# Patient Record
Sex: MALE | Race: WHITE | Employment: UNEMPLOYED | URBAN - METROPOLITAN AREA
[De-identification: names, ages, dates, MRNs, and addresses within clinical notes are randomized per-mention and may not be internally consistent; named-entity substitution may affect disease eponyms.]

---

## 2022-04-30 ENCOUNTER — HOSPITAL ENCOUNTER (INPATIENT)
Age: 87
LOS: 5 days | Discharge: HOME HEALTH | DRG: 291 | End: 2022-05-05
Attending: EMERGENCY MEDICINE | Admitting: INTERNAL MEDICINE
Payer: MEDICARE

## 2022-04-30 ENCOUNTER — APPOINTMENT (OUTPATIENT)
Dept: GENERAL RADIOLOGY | Age: 87
DRG: 291 | End: 2022-04-30
Payer: MEDICARE

## 2022-04-30 DIAGNOSIS — L03.315 CELLULITIS OF PERINEUM: ICD-10-CM

## 2022-04-30 DIAGNOSIS — I50.9 ACUTE CONGESTIVE HEART FAILURE, UNSPECIFIED HEART FAILURE TYPE (HCC): Primary | ICD-10-CM

## 2022-04-30 PROBLEM — I50.43 CHF (CONGESTIVE HEART FAILURE), NYHA CLASS I, ACUTE ON CHRONIC, COMBINED (HCC): Status: ACTIVE | Noted: 2022-04-30

## 2022-04-30 PROBLEM — I50.23 ACUTE ON CHRONIC SYSTOLIC HEART FAILURE (HCC): Status: ACTIVE | Noted: 2022-04-30

## 2022-04-30 LAB
ANION GAP SERPL CALCULATED.3IONS-SCNC: 8 MMOL/L (ref 3–16)
BACTERIA: ABNORMAL /HPF
BASOPHILS ABSOLUTE: 0 K/UL (ref 0–0.2)
BASOPHILS RELATIVE PERCENT: 0.5 %
BILIRUBIN URINE: NEGATIVE
BLOOD, URINE: ABNORMAL
BUN BLDV-MCNC: 14 MG/DL (ref 7–20)
CALCIUM SERPL-MCNC: 8.7 MG/DL (ref 8.3–10.6)
CHLORIDE BLD-SCNC: 103 MMOL/L (ref 99–110)
CLARITY: CLEAR
CO2: 25 MMOL/L (ref 21–32)
COLOR: YELLOW
CREAT SERPL-MCNC: 1 MG/DL (ref 0.8–1.3)
EOSINOPHILS ABSOLUTE: 0.1 K/UL (ref 0–0.6)
EOSINOPHILS RELATIVE PERCENT: 1.5 %
EPITHELIAL CELLS, UA: 2 /HPF (ref 0–5)
GFR AFRICAN AMERICAN: >60
GFR NON-AFRICAN AMERICAN: >60
GLUCOSE BLD-MCNC: 116 MG/DL (ref 70–99)
GLUCOSE BLD-MCNC: 132 MG/DL (ref 70–99)
GLUCOSE BLD-MCNC: 85 MG/DL (ref 70–99)
GLUCOSE BLD-MCNC: 92 MG/DL (ref 70–99)
GLUCOSE URINE: NEGATIVE MG/DL
HCT VFR BLD CALC: 37.6 % (ref 40.5–52.5)
HEMOGLOBIN: 12 G/DL (ref 13.5–17.5)
HYALINE CASTS: 1 /LPF (ref 0–8)
KETONES, URINE: NEGATIVE MG/DL
LACTIC ACID: 1.7 MMOL/L (ref 0.4–2)
LEUKOCYTE ESTERASE, URINE: ABNORMAL
LYMPHOCYTES ABSOLUTE: 1.1 K/UL (ref 1–5.1)
LYMPHOCYTES RELATIVE PERCENT: 14.9 %
MAGNESIUM: 1.9 MG/DL (ref 1.8–2.4)
MCH RBC QN AUTO: 27.9 PG (ref 26–34)
MCHC RBC AUTO-ENTMCNC: 31.9 G/DL (ref 31–36)
MCV RBC AUTO: 87.3 FL (ref 80–100)
MICROSCOPIC EXAMINATION: YES
MONOCYTES ABSOLUTE: 0.8 K/UL (ref 0–1.3)
MONOCYTES RELATIVE PERCENT: 11.3 %
NEUTROPHILS ABSOLUTE: 5.3 K/UL (ref 1.7–7.7)
NEUTROPHILS RELATIVE PERCENT: 71.8 %
NITRITE, URINE: NEGATIVE
PDW BLD-RTO: 17.6 % (ref 12.4–15.4)
PERFORMED ON: ABNORMAL
PERFORMED ON: ABNORMAL
PERFORMED ON: NORMAL
PH UA: 5.5 (ref 5–8)
PLATELET # BLD: 114 K/UL (ref 135–450)
PMV BLD AUTO: 8.9 FL (ref 5–10.5)
POTASSIUM REFLEX MAGNESIUM: 3.6 MMOL/L (ref 3.5–5.1)
PRO-BNP: ABNORMAL PG/ML (ref 0–449)
PROTEIN UA: ABNORMAL MG/DL
RBC # BLD: 4.31 M/UL (ref 4.2–5.9)
RBC UA: 3 /HPF (ref 0–4)
SODIUM BLD-SCNC: 136 MMOL/L (ref 136–145)
SPECIFIC GRAVITY UA: 1.01 (ref 1–1.03)
TROPONIN: <0.01 NG/ML
URINE TYPE: ABNORMAL
UROBILINOGEN, URINE: 1 E.U./DL
WBC # BLD: 7.4 K/UL (ref 4–11)
WBC UA: 12 /HPF (ref 0–5)

## 2022-04-30 PROCEDURE — 83880 ASSAY OF NATRIURETIC PEPTIDE: CPT

## 2022-04-30 PROCEDURE — 36415 COLL VENOUS BLD VENIPUNCTURE: CPT

## 2022-04-30 PROCEDURE — 71046 X-RAY EXAM CHEST 2 VIEWS: CPT

## 2022-04-30 PROCEDURE — 2580000003 HC RX 258: Performed by: INTERNAL MEDICINE

## 2022-04-30 PROCEDURE — 96374 THER/PROPH/DIAG INJ IV PUSH: CPT

## 2022-04-30 PROCEDURE — 51798 US URINE CAPACITY MEASURE: CPT

## 2022-04-30 PROCEDURE — 1200000000 HC SEMI PRIVATE

## 2022-04-30 PROCEDURE — 83605 ASSAY OF LACTIC ACID: CPT

## 2022-04-30 PROCEDURE — 93005 ELECTROCARDIOGRAM TRACING: CPT | Performed by: EMERGENCY MEDICINE

## 2022-04-30 PROCEDURE — 99223 1ST HOSP IP/OBS HIGH 75: CPT | Performed by: INTERNAL MEDICINE

## 2022-04-30 PROCEDURE — 83735 ASSAY OF MAGNESIUM: CPT

## 2022-04-30 PROCEDURE — 6360000002 HC RX W HCPCS: Performed by: EMERGENCY MEDICINE

## 2022-04-30 PROCEDURE — 99285 EMERGENCY DEPT VISIT HI MDM: CPT

## 2022-04-30 PROCEDURE — 85025 COMPLETE CBC W/AUTO DIFF WBC: CPT

## 2022-04-30 PROCEDURE — 2500000003 HC RX 250 WO HCPCS: Performed by: INTERNAL MEDICINE

## 2022-04-30 PROCEDURE — 6360000002 HC RX W HCPCS: Performed by: INTERNAL MEDICINE

## 2022-04-30 PROCEDURE — 81001 URINALYSIS AUTO W/SCOPE: CPT

## 2022-04-30 PROCEDURE — 84484 ASSAY OF TROPONIN QUANT: CPT

## 2022-04-30 PROCEDURE — 80048 BASIC METABOLIC PNL TOTAL CA: CPT

## 2022-04-30 PROCEDURE — 6370000000 HC RX 637 (ALT 250 FOR IP): Performed by: INTERNAL MEDICINE

## 2022-04-30 RX ORDER — ATORVASTATIN CALCIUM 10 MG/1
10 TABLET, FILM COATED ORAL DAILY
Status: DISCONTINUED | OUTPATIENT
Start: 2022-04-30 | End: 2022-05-05 | Stop reason: HOSPADM

## 2022-04-30 RX ORDER — FUROSEMIDE 10 MG/ML
20 INJECTION INTRAMUSCULAR; INTRAVENOUS ONCE
Status: COMPLETED | OUTPATIENT
Start: 2022-04-30 | End: 2022-04-30

## 2022-04-30 RX ORDER — NICOTINE POLACRILEX 4 MG
15 LOZENGE BUCCAL PRN
Status: DISCONTINUED | OUTPATIENT
Start: 2022-04-30 | End: 2022-05-05 | Stop reason: HOSPADM

## 2022-04-30 RX ORDER — SODIUM CHLORIDE 0.9 % (FLUSH) 0.9 %
5-40 SYRINGE (ML) INJECTION PRN
Status: DISCONTINUED | OUTPATIENT
Start: 2022-04-30 | End: 2022-05-05 | Stop reason: HOSPADM

## 2022-04-30 RX ORDER — SODIUM CHLORIDE 9 MG/ML
INJECTION, SOLUTION INTRAVENOUS PRN
Status: DISCONTINUED | OUTPATIENT
Start: 2022-04-30 | End: 2022-05-05 | Stop reason: HOSPADM

## 2022-04-30 RX ORDER — INSULIN LISPRO 100 [IU]/ML
0-6 INJECTION, SOLUTION INTRAVENOUS; SUBCUTANEOUS
Status: DISCONTINUED | OUTPATIENT
Start: 2022-04-30 | End: 2022-05-05 | Stop reason: HOSPADM

## 2022-04-30 RX ORDER — INSULIN LISPRO 100 [IU]/ML
0-3 INJECTION, SOLUTION INTRAVENOUS; SUBCUTANEOUS NIGHTLY
Status: DISCONTINUED | OUTPATIENT
Start: 2022-04-30 | End: 2022-05-05 | Stop reason: HOSPADM

## 2022-04-30 RX ORDER — POLYETHYLENE GLYCOL 3350 17 G/17G
17 POWDER, FOR SOLUTION ORAL DAILY PRN
Status: DISCONTINUED | OUTPATIENT
Start: 2022-04-30 | End: 2022-05-05 | Stop reason: HOSPADM

## 2022-04-30 RX ORDER — ENOXAPARIN SODIUM 100 MG/ML
40 INJECTION SUBCUTANEOUS DAILY
Status: DISCONTINUED | OUTPATIENT
Start: 2022-04-30 | End: 2022-05-05 | Stop reason: HOSPADM

## 2022-04-30 RX ORDER — DEXTROSE MONOHYDRATE 50 MG/ML
100 INJECTION, SOLUTION INTRAVENOUS PRN
Status: DISCONTINUED | OUTPATIENT
Start: 2022-04-30 | End: 2022-05-05 | Stop reason: HOSPADM

## 2022-04-30 RX ORDER — DEXTROSE MONOHYDRATE 25 G/50ML
12.5 INJECTION, SOLUTION INTRAVENOUS PRN
Status: DISCONTINUED | OUTPATIENT
Start: 2022-04-30 | End: 2022-05-05 | Stop reason: HOSPADM

## 2022-04-30 RX ORDER — ASPIRIN 81 MG/1
81 TABLET ORAL DAILY
Status: DISCONTINUED | OUTPATIENT
Start: 2022-04-30 | End: 2022-05-05 | Stop reason: HOSPADM

## 2022-04-30 RX ORDER — LISINOPRIL 10 MG/1
10 TABLET ORAL DAILY
Status: DISCONTINUED | OUTPATIENT
Start: 2022-05-01 | End: 2022-05-05 | Stop reason: HOSPADM

## 2022-04-30 RX ORDER — FUROSEMIDE 10 MG/ML
40 INJECTION INTRAMUSCULAR; INTRAVENOUS 2 TIMES DAILY
Status: DISCONTINUED | OUTPATIENT
Start: 2022-04-30 | End: 2022-05-02

## 2022-04-30 RX ORDER — TAMSULOSIN HYDROCHLORIDE 0.4 MG/1
0.8 CAPSULE ORAL DAILY
Status: DISCONTINUED | OUTPATIENT
Start: 2022-04-30 | End: 2022-05-05 | Stop reason: HOSPADM

## 2022-04-30 RX ORDER — ONDANSETRON 4 MG/1
4 TABLET, ORALLY DISINTEGRATING ORAL EVERY 8 HOURS PRN
Status: DISCONTINUED | OUTPATIENT
Start: 2022-04-30 | End: 2022-05-05 | Stop reason: HOSPADM

## 2022-04-30 RX ORDER — ACETAMINOPHEN 650 MG/1
650 SUPPOSITORY RECTAL EVERY 6 HOURS PRN
Status: DISCONTINUED | OUTPATIENT
Start: 2022-04-30 | End: 2022-05-05 | Stop reason: HOSPADM

## 2022-04-30 RX ORDER — NIFEDIPINE 30 MG/1
30 TABLET, EXTENDED RELEASE ORAL DAILY
Status: DISCONTINUED | OUTPATIENT
Start: 2022-04-30 | End: 2022-04-30

## 2022-04-30 RX ORDER — METOPROLOL SUCCINATE 25 MG/1
25 TABLET, EXTENDED RELEASE ORAL DAILY
Status: DISCONTINUED | OUTPATIENT
Start: 2022-04-30 | End: 2022-05-05 | Stop reason: HOSPADM

## 2022-04-30 RX ORDER — ONDANSETRON 2 MG/ML
4 INJECTION INTRAMUSCULAR; INTRAVENOUS EVERY 6 HOURS PRN
Status: DISCONTINUED | OUTPATIENT
Start: 2022-04-30 | End: 2022-05-05 | Stop reason: HOSPADM

## 2022-04-30 RX ORDER — ACETAMINOPHEN 325 MG/1
650 TABLET ORAL EVERY 6 HOURS PRN
Status: DISCONTINUED | OUTPATIENT
Start: 2022-04-30 | End: 2022-05-05 | Stop reason: HOSPADM

## 2022-04-30 RX ORDER — ASCORBIC ACID 500 MG
500 TABLET ORAL DAILY
Status: DISCONTINUED | OUTPATIENT
Start: 2022-04-30 | End: 2022-05-05 | Stop reason: HOSPADM

## 2022-04-30 RX ORDER — SODIUM CHLORIDE 0.9 % (FLUSH) 0.9 %
5-40 SYRINGE (ML) INJECTION EVERY 12 HOURS SCHEDULED
Status: DISCONTINUED | OUTPATIENT
Start: 2022-04-30 | End: 2022-05-05 | Stop reason: HOSPADM

## 2022-04-30 RX ORDER — VALSARTAN 80 MG/1
160 TABLET ORAL DAILY
Status: DISCONTINUED | OUTPATIENT
Start: 2022-04-30 | End: 2022-04-30

## 2022-04-30 RX ADMIN — ENOXAPARIN SODIUM 40 MG: 100 INJECTION SUBCUTANEOUS at 11:49

## 2022-04-30 RX ADMIN — METOPROLOL SUCCINATE 25 MG: 25 TABLET, EXTENDED RELEASE ORAL at 11:47

## 2022-04-30 RX ADMIN — Medication 10 ML: at 11:57

## 2022-04-30 RX ADMIN — ASPIRIN 81 MG: 81 TABLET, COATED ORAL at 11:47

## 2022-04-30 RX ADMIN — OXYCODONE HYDROCHLORIDE AND ACETAMINOPHEN 500 MG: 500 TABLET ORAL at 11:47

## 2022-04-30 RX ADMIN — Medication 10 ML: at 20:10

## 2022-04-30 RX ADMIN — MICONAZOLE NITRATE: 2 POWDER TOPICAL at 20:10

## 2022-04-30 RX ADMIN — FUROSEMIDE 40 MG: 10 INJECTION, SOLUTION INTRAMUSCULAR; INTRAVENOUS at 17:27

## 2022-04-30 RX ADMIN — FUROSEMIDE 40 MG: 10 INJECTION, SOLUTION INTRAMUSCULAR; INTRAVENOUS at 11:49

## 2022-04-30 RX ADMIN — ATORVASTATIN CALCIUM 10 MG: 10 TABLET, FILM COATED ORAL at 11:47

## 2022-04-30 RX ADMIN — FUROSEMIDE 20 MG: 10 INJECTION, SOLUTION INTRAMUSCULAR; INTRAVENOUS at 08:32

## 2022-04-30 RX ADMIN — MICONAZOLE NITRATE: 2 POWDER TOPICAL at 11:49

## 2022-04-30 RX ADMIN — NIFEDIPINE 30 MG: 30 TABLET, FILM COATED, EXTENDED RELEASE ORAL at 11:47

## 2022-04-30 RX ADMIN — TAMSULOSIN HYDROCHLORIDE 0.8 MG: 0.4 CAPSULE ORAL at 11:47

## 2022-04-30 ASSESSMENT — PAIN - FUNCTIONAL ASSESSMENT: PAIN_FUNCTIONAL_ASSESSMENT: NONE - DENIES PAIN

## 2022-04-30 ASSESSMENT — ENCOUNTER SYMPTOMS
SHORTNESS OF BREATH: 1
COUGH: 0
NAUSEA: 0
VOMITING: 0
ABDOMINAL PAIN: 0

## 2022-04-30 NOTE — PROGRESS NOTES
4 Eyes Skin Assessment     NAME:  Shila Chamberlain YOB: 1933  MEDICAL RECORD NUMBER:  [id number]    The patient is being assess for  Admission    I agree that 2 RN's have performed a thorough Head to Toe Skin Assessment on the patient. ALL assessment sites listed below have been assessed. Areas assessed by both nurses:    Arms, Elbows, Hands and Sacrum. Buttock, Coccyx, Ischium        Does the Patient have a Wound? Yes wound(s) were present on assessment.  LDA wound assessment was Initiated and completed        Mikel Prevention initiated:  Yes   Wound Care Orders initiated:  Yes    Pressure Injury (Stage 3,4, Unstageable, DTI, NWPT, and Complex wounds) if present place consult order under [de-identified] NA    New and Established Ostomies if present place consult order under : NA      Nurse 1 eSignature: Electronically signed by Liz Ramos RN on 4/30/22 at 12:11 PM EDT    **SHARE this note so that the co-signing nurse is able to place an eSignature**    Nurse 2 eSignature: {Esignature:[id number]}

## 2022-04-30 NOTE — CONSULTS
Referring Physician: Dr. Pearl Krishna  Reason for Consultation: CHF exacerbation  Chief Complaint: Short of breath    Subjective:   History of Present Illness:  Shan Deleon is a 80 y.o. patient who presented to the hospital with complaints of progressive shortness of breath, lower extremity edema, and scrotal edema. The patient typically receives his cardiac care through [facility]. His daughter is present bedside. He believes his symptoms only started over the past few days but later acknowledges that he does not particularly pay attention to the swelling in his legs. He began noticing difficulty in urinating secondary to the edema a few days ago. He denies associated chest pains. He is uncertain if he has gained weight. He reports compliance with his medications. He has known to have a large abdominal aortic aneurysm but his daughter says that he refused surgery. He also had coronary bypass >20 years ago. He lives at home alone. He endorses chronic dyspnea on exertion but is not particularly active with his advanced age. He denies PND or orthopnea but is short of breath with minimal activity. Past Medical History:   has a past medical history of Diabetes mellitus ([address] Hyperlipidemia, Hypertension, and MI (myocardial infarction) ([address] Surgical History:   has a past surgical history that includes Cardiac surgery and hernia repair. Social History:   reports that he quit smoking about 23 years ago. His smoking use included cigarettes. He does not have any smokeless tobacco history on file. He reports that he does not drink alcohol and does not use drugs. Family History:  Denies premature coronary atherosclerosis. Home Medications:  Were reviewed and are listed in nursing record and/or below  Prior to Admission medications    Medication Sig Start Date End Date Taking?  Authorizing Provider   linagliptin (TRADJENTA) 5 MG tablet Take 5 mg by mouth daily   Yes Historical Provider, MD NIFEdipine (NIFEDICAL XL) 30 MG CR tablet Take 30 mg by mouth daily. Historical Provider, MD   lisinopril-hydroCHLOROthiazide (PRINZIDE;ZESTORETIC) 20-25 MG per tablet Take 0.5 tablets by mouth daily     Historical Provider, MD   metFORMIN ER (GLUCOPHAGE-XR) 750 MG XR tablet Take 1,500 mg by mouth daily (with breakfast) 2 tabs in morning     Historical Provider, MD   atorvastatin (LIPITOR) 10 MG tablet Take 10 mg by mouth daily. Historical Provider, MD   atenolol (TENORMIN) 25 MG tablet Take 50 mg by mouth daily 2 tabs in am     Historical Provider, MD   tamsulosin (FLOMAX) 0.4 MG capsule Take 0.8 mg by mouth daily     Historical Provider, MD   aspirin 81 MG EC tablet Take 81 mg by mouth daily. Historical Provider, MD   vitamin E 400 UNIT capsule Take 400 Units by mouth daily. Historical Provider, MD   niacin 100 MG tablet Take 100 mg by mouth daily (with breakfast). Historical Provider, MD   Omega-3 Fatty Acids (FISH OIL) 1000 MG CAPS Take 3,000 mg by mouth 3 times daily. Historical Provider, MD   ascorbic acid (VITAMIN C) 500 MG tablet Take 500 mg by mouth daily.     Historical Provider, MD      CURRENT Medications:  ascorbic acid (VITAMIN C) tablet 500 mg, Daily  aspirin EC tablet 81 mg, Daily  atorvastatin (LIPITOR) tablet 10 mg, Daily  tamsulosin (FLOMAX) capsule 0.8 mg, Daily  sodium chloride flush 0.9 % injection 5-40 mL, 2 times per day  sodium chloride flush 0.9 % injection 5-40 mL, PRN  0.9 % sodium chloride infusion, PRN  ondansetron (ZOFRAN-ODT) disintegrating tablet 4 mg, Q8H PRN   Or  ondansetron (ZOFRAN) injection 4 mg, Q6H PRN  polyethylene glycol (GLYCOLAX) packet 17 g, Daily PRN  acetaminophen (TYLENOL) tablet 650 mg, Q6H PRN   Or  acetaminophen (TYLENOL) suppository 650 mg, Q6H PRN  enoxaparin (LOVENOX) injection 40 mg, Daily  metoprolol succinate (TOPROL XL) extended release tablet 25 mg, Daily  furosemide (LASIX) injection 40 mg, BID  miconazole (MICOTIN) 2 % powder, BID  insulin lispro (HUMALOG) injection vial 0-6 Units, TID WC  insulin lispro (HUMALOG) injection vial 0-3 Units, Nightly  glucose (GLUTOSE) 40 % oral gel 15 g, PRN  dextrose 50 % IV solution, PRN  glucagon (rDNA) injection 1 mg, PRN  dextrose 5 % solution, PRN  perflutren lipid microspheres (DEFINITY) injection 1.65 mg, ONCE PRN    Allergies:  Penicillins     Review of Systems:   · Constitutional: no unanticipated weight loss. There's been no change in energy level, sleep pattern, or activity level. No fevers, chills. · Eyes: No visual changes or diplopia. No scleral icterus. · ENT: No Headaches, hearing loss or vertigo. No mouth sores or sore throat. · Cardiovascular: as reviewed in HPI  · Respiratory: No cough or wheezing, no sputum production. No hemoptysis. · Gastrointestinal: No abdominal pain, appetite loss, blood in stools. No change in bowel or bladder habits. · Genitourinary: No dysuria, trouble voiding, or hematuria. · Musculoskeletal:  No gait disturbance, no joint complaints. · Integumentary: No rash or pruritis. · Neurological: No headache, diplopia, change in muscle strength, numbness or tingling. · Psychiatric: No anxiety or depression. · Endocrine: No temperature intolerance. No excessive thirst, fluid intake, or urination. No tremor. · Hematologic/Lymphatic: No abnormal bruising or bleeding, blood clots or swollen lymph nodes. · Allergic/Immunologic: No nasal congestion or hives. Objective:   PHYSICAL EXAM:    Vitals:    04/30/22 1035   BP: 139/67   Pulse: 88   Resp: 20   Temp: 98 °F (36.7 °C)   SpO2: 98%    Weight: 204 lb 4.8 oz (92.7 kg)       General Appearance:  Alert, cooperative, no respiratory distress at rest.  Elderly. Head:  Normocephalic, without obvious abnormality, atraumatic. Eyes:  Pupils equal and round. No scleral icterus. Mouth: Moist mucosa, no pharyngeal erythema. Poor dentition. Nose: Nares normal. No drainage or sinus tenderness.    Neck: Supple, symmetrical, trachea midline. No adenopathy. No tenderness/mass/nodules. No carotid bruit.  +elevated JVD. Lungs:   Clear to auscultation bilaterally, respirations unlabored. No wheeze, rales, or rhonchi. Chest Wall:  No tenderness or deformity. Heart:  Regular rate. S1/S2 normal. No murmur, rub, or gallop. Abdomen:   Soft, non-tender, bowel sounds active. Musculoskeletal: No muscle wasting or digital clubbing. Extremities: Extremities normal, atraumatic. No cyanosis. 2-3+ BLE edema. Pulses: 2+ radial and carotid pulses, symmetric. Skin: No rashes or lesions. Pysch: Normal mood and affect. Alert and oriented x 4. Neurologic: Moves all extremities. Follows commands. Labs     CBC:   Lab Results   Component Value Date    WBC 7.4 04/30/2022    RBC 4.31 04/30/2022    HGB 12.0 04/30/2022    HCT 37.6 04/30/2022    MCV 87.3 04/30/2022    RDW 17.6 04/30/2022     04/30/2022     CMP:  Lab Results   Component Value Date     04/30/2022    K 3.6 04/30/2022     04/30/2022    CO2 25 04/30/2022    BUN 14 04/30/2022    CREATININE 1.0 04/30/2022    GFRAA >60 04/30/2022    LABGLOM >60 04/30/2022    GLUCOSE 92 04/30/2022    CALCIUM 8.7 04/30/2022     PT/INR:  No results found for: PTINR  HgBA1c:No results found for: LABA1C  Lab Results   Component Value Date    TROPONINI <0.01 04/30/2022       Cardiac Data     EKG: Personally interpreted. 4/30/2022. Sinus rhythm with frequent and consecutive PVCs. Left axis deviation. Poor R wave progression. Voltage criteria for LVH. Abdominal CT: 5/2017 Infrarenal abdominal aortic aneurysm measures up to 6.1 cm. Telemetry: Personally interpreted. Sinus with PVCs. Assessment and Plan   1) Acute on chronic systolic heart failure. EF unknown. NYHA III. Appears hypervolemic. Continue IV Lasix. Continue Toprol-XL. Start home medication of lisinopril. Discontinue home medications of hydrochlorothiazide and nifedipine.   Will consider use of Aldactone pending findings on echocardiogram.  Discussion of SGLT2i can be deferred to the outpatient setting. Patient is not an ICD candidate with advanced age. Will obtain echocardiogram on Monday. 2) CAD s/p CABG. continue medical management risk factor modification including use of aspirin, statin, and beta-blocker. 3) AAA without rupture. Measured 6.1 cm five years ago but has refused intervention. Continue statin and attempts at strict blood pressure control. 4) Type 2 diabetes mellitus. Diabetes management per primary team.    5) Essential hypertension. Controlled. Goal BP <130/80. Continue medical therapy. Code status: The patient is alert and answers questions appropriately. He has capacity for medical decision-making. He endorses his wishes for DNR/DNI-CCA. Patient's daughter is present bedside and is in agreement. Will change CODE STATUS within epic. Overall, the problems requiring hospitalization are high in severity. Thank you for allowing us to participate in the care of [address] [place] [address]. Neda Palm [facility], [address] [place] [address] Road  4/30/2022 2:36 PM

## 2022-04-30 NOTE — ED TRIAGE NOTES
Pt brought in per son after falling this morning at 0330, pt lives at home and stumbled and fell. Pt denies LOC as far as he can remember. Pt has multiple sores with fluid draining, increase in leg swelling, increase in SOB, redness and swelling in scrotum, h/o CABG, DM. Pt states abdomen feels swollen and also has difficulty with urination.

## 2022-04-30 NOTE — ED NOTES
Pt states he has to urinate, but is unable to.   RN repositioned pt.       Shayan Gray RN  04/30/22 6618

## 2022-04-30 NOTE — ED NOTES
Bladder scan completed- MD aware that only 8ml shown in bladder scan. Orders for Lasix received. Will await K+ to return from lab before giving lasix.        Lena Singh RN  04/30/22 4900

## 2022-04-30 NOTE — ED NOTES

## 2022-04-30 NOTE — H&P
Hospital Medicine History & Physical      PCP: Binu Marin MD    Date of Admission: 4/30/2022    Date of Service: Pt seen/examined on 4/30/2022   and Admitted to Inpatient with expected LOS greater than two midnights due to medical therapy. Chief Complaint: Difficulty urinating and leg swelling      History Of Present Illness:      80 y.o. male who presented to [facility] \[facility]\"" AT [place] with 2 to 3-day history of increasing lower extremity swelling and difficulty urinating. Patient is also had some more dyspnea. Daughter who accompanies the patient states that he has had a hard time urinating. They report some redness of his lower extremities as well. Patient denies incontinence. Patient denies chest pain. Recently saw their cardiologist with [facility] Dr. Azam Ge and he recommended follow-up in a year as he had minimal symptoms and seem to be doing okay. Per review of care everywhere he had advised seeking some follow-up for an abdominal aortic aneurysm  Patient had a bypass in 2000. Appears family and patient were not interested in follow-up stress test at the time of Dr. Shruthi Jarquin office visit      Past Medical History:          Diagnosis Date    Diabetes mellitus ([facility]     Hyperlipidemia     Hypertension     MI (myocardial infarction) ([facility]        Past Surgical History:          Procedure Laterality Date    CARDIAC SURGERY      CABGx5 vessels    HERNIA REPAIR      x 3       Medications Prior to Admission:      Prior to Admission medications    Medication Sig Start Date End Date Taking? Authorizing Provider   linagliptin (TRADJENTA) 5 MG tablet Take 5 mg by mouth daily   Yes Historical Provider, MD   NIFEdipine (NIFEDICAL XL) 30 MG CR tablet Take 30 mg by mouth daily.     Historical Provider, MD   lisinopril-hydroCHLOROthiazide (PRINZIDE;ZESTORETIC) 20-25 MG per tablet Take 0.5 tablets by mouth daily     Historical Provider, MD   metFORMIN ER (GLUCOPHAGE-XR) 750 MG XR tablet Take 1,500 mg by mouth daily (with breakfast) 2 tabs in morning     Historical Provider, MD   atorvastatin (LIPITOR) 10 MG tablet Take 10 mg by mouth daily. Historical Provider, MD   atenolol (TENORMIN) 25 MG tablet Take 50 mg by mouth daily 2 tabs in am     Historical Provider, MD   tamsulosin (FLOMAX) 0.4 MG capsule Take 0.8 mg by mouth daily     Historical Provider, MD   aspirin 81 MG EC tablet Take 81 mg by mouth daily. Historical Provider, MD   vitamin E 400 UNIT capsule Take 400 Units by mouth daily. Historical Provider, MD   niacin 100 MG tablet Take 100 mg by mouth daily (with breakfast). Historical Provider, MD   Omega-3 Fatty Acids (FISH OIL) 1000 MG CAPS Take 3,000 mg by mouth 3 times daily. Historical Provider, MD   ascorbic acid (VITAMIN C) 500 MG tablet Take 500 mg by mouth daily. Historical Provider, MD       Allergies:  Penicillins    Social History:      The patient currently lives alone  He has 5 children 1 of whom is   TOBACCO:   reports that he quit smoking about 23 years ago. His smoking use included cigarettes. He does not have any smokeless tobacco history on file. ETOH:   reports no history of alcohol use. E-Cigarettes/Vaping Use     Questions Responses    E-Cigarette/Vaping Use     Start Date     Passive Exposure     Quit Date     Counseling Given     Comments             Family History:       Reviewed in detail and negative for DM, CAD, Cancer, CVA. Positive as follows:    History reviewed. No pertinent family history. REVIEW OF SYSTEMS COMPLETED:   Pertinent positives as noted in the HPI. All other systems reviewed and negative. PHYSICAL EXAM PERFORMED:    /67   Pulse 88   Temp 98 °F (36.7 °C) (Axillary)   Resp 20   Ht 6' 2\" (1.88 m)   Wt 204 lb 4.8 oz (92.7 kg)   SpO2 98%   BMI 26.23 kg/m²     General appearance:  No apparent distress, appears stated age and cooperative. HEENT:  Normal cephalic, atraumatic without obvious deformity.  Pupils equal, round, and reactive to light. Extra ocular muscles intact. Conjunctivae/corneas clear. Neck: Supple, with full range of motion. No jugular venous distention. Trachea midline. Respiratory: Bibasilar Rales  Cardiovascular:  Regular rate and rhythm with normal S1/S2 without murmurs, rubs or gallops. Abdomen: Soft, non-tender, non-distended with normal bowel sounds. Musculoskeletal:  No clubbing, cyanosis or edema bilaterally. Full range of motion without deformity. Skin: Skin color, texture, turgor normal.  No rashes or lesions. Neurologic:  Neurovascularly intact without any focal sensory/motor deficits. Cranial nerves: II-XII intact, grossly non-focal.  Psychiatric:  Alert and oriented, thought content appropriate, normal insight  Capillary Refill: Brisk,3 seconds, normal  Peripheral Pulses: +2 palpable, equal bilaterally   Extremities 2+ pitting edema to the mid calf mild redness of lower extremities. Extremities are not warm to touch    Labs:     Recent Labs     04/30/22  0755   WBC 7.4   HGB 12.0*   HCT 37.6*   *     Recent Labs     04/30/22  0755      K 3.6      CO2 25   BUN 14   CREATININE 1.0   CALCIUM 8.7     No results for input(s): AST, ALT, BILIDIR, BILITOT, ALKPHOS in the last 72 hours. No results for input(s): INR in the last 72 hours. Recent Labs     04/30/22  0755 04/30/22  1049   TROPONINI <0.01 <0.01     BNP >73896    Urinalysis:      Lab Results   Component Value Date    NITRU Negative 04/30/2022    WBCUA 12 04/30/2022    BACTERIA None Seen 04/30/2022    RBCUA 3 04/30/2022    BLOODU Trace 04/30/2022    SPECGRAV 1.013 04/30/2022    GLUCOSEU Negative 04/30/2022       Radiology:     Independently reviewed by me    XR CHEST (2 VW)   Final Result   Pulmonary edema and small bilateral pleural effusions.            EKG- NSR with occ PVCs    ASSESSMENT:    Congestive heart failure new onset-I do not see recent echo on care everywhere  Pt has demonstrable fluid on exam and CXR  Check serial cardiac enzymes to rule out ischemia first set negative  Lasix 40 mg IV every 12 hours to start  Strict I's and O's  Continue angiotensin receptor blocker  Changed to atenolol to Toprol-XL  Echocardiogram ordered  Consult cardiology  I have stopped Procardia for now  Avoiding Curry for now. Advised patient to use urinal. He seems weak I am reluctant to place Curry as a suspect he likely has some BPH and could end up with complications    Abdominal aortic aneurysm-apparently was 6 cm several years ago  We will arrange for abdominal ultrasound Monday for follow-up  Continue aggressive blood pressure control  Given patient's age would be very reasonable to just focus on medical management    Diabetes mellitus type 2-hold metformin for now  Continue correction insulin per sliding scale    Hyperlipidemia-continue Lipitor              DVT Prophylaxis:lovenox  Diet: ADULT DIET; Regular; 4 carb choices (60 gm/meal); Low Sodium (2 gm)  Code Status: Full Code      Dispo - 2-4 d       Steffani Peabody, MD    Thank you Juliette Freeman MD for the opportunity to be involved in this patient's care. If you have any questions or concerns please feel free to contact me at [phone].

## 2022-04-30 NOTE — ED PROVIDER NOTES
[address] [facility] ENCOUNTER      Pt Name: Patricia Escobar. MRN: [id number]  Birthdate 1/26/1933  Date of evaluation: 4/30/2022  Provider: Armani Peter MD    [address] [place] [address]       Chief Complaint   Patient presents with    Leg Swelling     increased leg swelling for the past week, h/o heart failure and CABGX5 20 years ago.  Swelling     abdominal     Abrasion     right arm, right leg skin sores/ abraisions    Fall     pt fell at home at 56, son had to help him up. HISTORY OF PRESENT ILLNESS   (Location/Symptom, Timing/Onset, Context/Setting, Quality, Duration, Modifying Factors, Severity)  Note limiting factors. Camille Feliciano Sr. is a 80 y.o. male who presents to the emergency department with lower extremity edema and a fall this morning. HPI     This is a pleasant 80-year-old  gentleman with history of CHF hypertension type 2 diabetes and is status post CABG about 20 years ago. He has had increased lower extremity edema extending to the scrotum and abdomen over the past few days. No chest pain but is short of breath although the latter seems to be somewhat chronic in nature. He had a mechanical fall this morning but denies injury however his son had to help him up. He typically gets around with a cane at home. Denies any actual pain but has some abrasions on the right arm and bilateral lower extremities. Denies cough chest pain or abdominal pain. Nursing Notes were reviewed. REVIEW OF SYSTEMS    (2-9 systems for level 4, 10 or more for level 5)     Review of Systems   Constitutional: Negative for chills and fever. Respiratory: Positive for shortness of breath. Negative for cough. Cardiovascular: Positive for leg swelling. Negative for chest pain and palpitations. Gastrointestinal: Negative for abdominal pain, nausea and vomiting. Genitourinary: Positive for difficulty urinating. Negative for dysuria. Neurological: Negative for dizziness. All other systems reviewed and are negative. Except as noted above the remainder of the review of systems was reviewed and negative. PAST MEDICAL HISTORY     Past Medical History:   Diagnosis Date    Diabetes mellitus ([facility]     Hyperlipidemia     Hypertension     MI (myocardial infarction) ([facility] [facility]          SURGICAL HISTORY       Past Surgical History:   Procedure Laterality Date    CARDIAC SURGERY      CABGx5 vessels    HERNIA REPAIR      x 3         CURRENT MEDICATIONS       Previous Medications    ASCORBIC ACID (VITAMIN C) 500 MG TABLET    Take 500 mg by mouth daily. ASPIRIN 81 MG EC TABLET    Take 81 mg by mouth daily. ATENOLOL (TENORMIN) 25 MG TABLET    Take 25 mg by mouth daily. 2 tabs in am    ATORVASTATIN (LIPITOR) 10 MG TABLET    Take 10 mg by mouth daily. GLIPIZIDE (GLUCOTROL) 5 MG TABLET    Take 5 mg by mouth daily. LISINOPRIL-HYDROCHLOROTHIAZIDE (PRINZIDE;ZESTORETIC) 10-12.5 MG PER TABLET    Take 1 tablet by mouth daily. METFORMIN ER (GLUCOPHAGE-XR) 750 MG XR TABLET    Take 750 mg by mouth daily (with breakfast). 2 tabs in morning    MUPIROCIN (BACTROBAN) 2 % OINTMENT    APPLY TOPICALLY THREE TIMES DAILY    NIACIN 100 MG TABLET    Take 100 mg by mouth daily (with breakfast). NIFEDIPINE (NIFEDICAL XL) 30 MG CR TABLET    Take 30 mg by mouth daily. OMEGA-3 FATTY ACIDS (FISH OIL) 1000 MG CAPS    Take 3,000 mg by mouth 3 times daily. TAMSULOSIN (FLOMAX) 0.4 MG CAPSULE    Take 0.4 mg by mouth daily. VALSARTAN (DIOVAN) 160 MG TABLET    Take 160 mg by mouth daily. VITAMIN E 400 UNIT CAPSULE    Take 400 Units by mouth daily. ALLERGIES     Penicillins    FAMILY HISTORY     History reviewed. No pertinent family history. SOCIAL HISTORY       Social History     Socioeconomic History    Marital status:       Spouse name: None    Number of children: None    Years of education: None    Highest education level: None   Occupational History    None   Tobacco Use    Smoking status: Former Smoker     Types: Cigarettes     Quit date: 1999     Years since quittin.0    Smokeless tobacco: None   Substance and Sexual Activity    Alcohol use: No    Drug use: No    Sexual activity: None   Other Topics Concern    None   Social History Narrative    None     Social Determinants of Health     Financial Resource Strain:     Difficulty of Paying Living Expenses: Not on file   Food Insecurity:     Worried About Running Out of Food in the Last Year: Not on file    Munira of Food in the Last Year: Not on file   Transportation Needs:     Lack of Transportation (Medical): Not on file    Lack of Transportation (Non-Medical):  Not on file   Physical Activity:     Days of Exercise per Week: Not on file    Minutes of Exercise per Session: Not on file   Stress:     Feeling of Stress : Not on file   Social Connections:     Frequency of Communication with Friends and Family: Not on file    Frequency of Social Gatherings with Friends and Family: Not on file    Attends Ten[employer] Services: Not on file    Active Member of [address] [place] [address] or Organizations: Not on file    Attends Club or Organization Meetings: Not on file    Marital Status: Not on file   Intimate Partner Violence:     Fear of Current or Ex-Partner: Not on file    Emotionally Abused: Not on file    Physically Abused: Not on file    Sexually Abused: Not on file   Housing Stability:     Unable to Pay for Housing in the Last Year: Not on file    Number of [place] in the Last Year: Not on file    Unstable Housing in the Last Year: Not on file       SCREENINGS         [place] Coma Scale  Eye Opening: Spontaneous  Best Verbal Response: Oriented  Best Motor Response: Obeys commands  [place] Coma Scale Score: 15                     CIWA Assessment  BP: 118/66  Pulse: 75                 PHYSICAL EXAM    (up to 7 for level 4, 8 or more for level 5)     ED Triage Vitals BP Temp Temp Source Pulse Resp SpO2 Height Weight   04/30/22 0730 04/30/22 0727 04/30/22 0727 -- 04/30/22 0727 -- 04/30/22 0727 04/30/22 0732   113/65 97.6 °F (36.4 °C) Oral  20  6' 2\" (1.88 m) (!) 447 lb 5 oz (202.9 kg)       Physical Exam  Constitutional:       General: He is not in acute distress. Appearance: Normal appearance. He is not ill-appearing. HENT:      Head: Normocephalic and atraumatic. Right Ear: Tympanic membrane and ear canal normal.      Left Ear: Tympanic membrane and ear canal normal.      Nose: Nose normal. No congestion. Mouth/Throat:      Mouth: Mucous membranes are moist.      Pharynx: No oropharyngeal exudate. Eyes:      Extraocular Movements: Extraocular movements intact. Pupils: Pupils are equal, round, and reactive to light. Cardiovascular:      Rate and Rhythm: Normal rate. Pulses: Normal pulses. Heart sounds: Murmur heard. No friction rub. No gallop. Pulmonary:      Effort: Pulmonary effort is normal.      Breath sounds: Rales present. No wheezing or rhonchi. Abdominal:      General: Bowel sounds are normal.      Palpations: Abdomen is soft. Tenderness: There is no abdominal tenderness. There is no guarding or rebound. Genitourinary:     Penis: Uncircumcised. Testes: Normal.      Comments: Scrotal Edema  Musculoskeletal:         General: Normal range of motion. Cervical back: Normal range of motion and neck supple. Right lower leg: Edema present. Left lower leg: Edema present. Skin:     General: Skin is warm. Capillary Refill: Capillary refill takes less than 2 seconds. Findings: Rash present. Neurological:      Mental Status: He is alert and oriented to person, place, and time.    Psychiatric:         Mood and Affect: Mood normal.         Behavior: Behavior normal.         DIAGNOSTIC RESULTS     EKG: All EKG's are interpreted by the Emergency Department Physician who either signs or Co-signs this chart in the absence of a cardiologist.    EKG Interpretation    Interpreted by emergency department physician    Rhythm: normal sinus   Rate: normal  Axis: left  Ectopy: premature ventricular contractions (unifocal)  Conduction: Prolonged QT  ST Segments: no acute change  T Waves: no acute change  Q Waves: nonspecific    Clinical Impression: non-specific EKG, no acute changes from 5/5/2015 except PVCs    Kevin Jara MD    RADIOLOGY:   Non-plain film images such as CT, Ultrasound and MRI are read by the radiologist. Plain radiographic images are visualized and preliminarily interpreted by the emergency physician with the below findings:        Interpretation per the Radiologist below, if available at the time of this note:    XR CHEST (2 VW)   Final Result   Pulmonary edema and small bilateral pleural effusions. ED BEDSIDE ULTRASOUND:   Performed by ED Physician - none    LABS:  Labs Reviewed   CBC WITH AUTO DIFFERENTIAL - Abnormal; Notable for the following components:       Result Value    Hemoglobin 12.0 (*)     Hematocrit 37.6 (*)     RDW 17.6 (*)     Platelets 007 (*)     All other components within normal limits   BRAIN NATRIURETIC PEPTIDE - Abnormal; Notable for the following components:    Pro-BNP 12,727 (*)     All other components within normal limits   BASIC METABOLIC PANEL W/ REFLEX TO MG FOR LOW K   TROPONIN   LACTIC ACID   URINALYSIS WITH MICROSCOPIC   POCT GLUCOSE       All other labs were within normal range or not returned as of this dictation. EMERGENCY DEPARTMENT COURSE and DIFFERENTIAL DIAGNOSIS/MDM:   Vitals:    Vitals:    04/30/22 0732 04/30/22 0815 04/30/22 0820 04/30/22 0830   BP:  124/65  118/66   Pulse:  83  75   Resp:  22  18   Temp:       TempSrc:       SpO2:  97%  (!) 89%   Weight: 202 lb 14.4 oz (92 kg)  204 lb 4.8 oz (92.7 kg)    Height:           The above history and physical exam were performed. I reviewed his past medical past surgical social family history. 12 point review of system was performed and is negative unless otherwise noted. After the chest x-ray returned he was given Lasix. He has no history of heart failure that I can find in the chart. MDM  Consider the diagnosis of ACS versus CHF versus pneumonia. At this point in time he has CHF given that he has some pulmonary edema and is now requiring about 2 L of oxygen by nasal cannula. He has significant anasarca associated with this so this is consistent as this is markedly elevated BNP. This point time he certainly merits admission to the hospital for further work-up and evaluation. I considered the diagnosis of cellulitis for his lower extremities and groin region however think it cellulitis secondary to Candida given that he has satellite lesions. REASSESSMENT          CRITICAL CARE TIME   Total Critical Care time was 23 minutes, excluding separately reportable procedures. There was a high probability of clinically significant/life threatening deterioration in the patient's condition which required my urgent intervention. For new onset CHF    CONSULTS:  None    PROCEDURES:  Unless otherwise noted below, none     Procedures        FINAL IMPRESSION      1. Acute congestive heart failure, unspecified heart failure type ([address]    2. Cellulitis of perineum          DISPOSITION/PLAN   DISPOSITION    Decision to Admit    PATIENT REFERRED TO:  No follow-up provider specified. DISCHARGE MEDICATIONS:  New Prescriptions    No medications on file     Controlled Substances Monitoring:     No flowsheet data found.     (Please note that portions of this note were completed with a voice recognition program.  Efforts were made to edit the dictations but occasionally words are mis-transcribed.)    Erika Wagner MD (electronically signed)  Attending Emergency Physician         Erika Wagner MD  04/30/22 [id number]

## 2022-04-30 NOTE — ED NOTES
Pt under wear removed, large areas of red, excoriated skin noted in folds. Under wear had some drainage from excoriated areas.      Valiant Smoker, RN  04/30/22 [id number]

## 2022-05-01 LAB
ANION GAP SERPL CALCULATED.3IONS-SCNC: 11 MMOL/L (ref 3–16)
BUN BLDV-MCNC: 14 MG/DL (ref 7–20)
CALCIUM SERPL-MCNC: 8.6 MG/DL (ref 8.3–10.6)
CHLORIDE BLD-SCNC: 104 MMOL/L (ref 99–110)
CHOLESTEROL, TOTAL: 94 MG/DL (ref 0–199)
CO2: 26 MMOL/L (ref 21–32)
CREAT SERPL-MCNC: 1.1 MG/DL (ref 0.8–1.3)
EKG ATRIAL RATE: 86 BPM
EKG DIAGNOSIS: NORMAL
EKG P AXIS: 103 DEGREES
EKG P-R INTERVAL: 170 MS
EKG Q-T INTERVAL: 430 MS
EKG QRS DURATION: 104 MS
EKG QTC CALCULATION (BAZETT): 514 MS
EKG R AXIS: -37 DEGREES
EKG T AXIS: 95 DEGREES
EKG VENTRICULAR RATE: 86 BPM
FERRITIN: 171.3 NG/ML (ref 30–400)
FOLATE: 6 NG/ML (ref 4.78–24.2)
GFR AFRICAN AMERICAN: >60
GFR NON-AFRICAN AMERICAN: >60
GLUCOSE BLD-MCNC: 118 MG/DL (ref 70–99)
GLUCOSE BLD-MCNC: 127 MG/DL (ref 70–99)
GLUCOSE BLD-MCNC: 192 MG/DL (ref 70–99)
GLUCOSE BLD-MCNC: 71 MG/DL (ref 70–99)
GLUCOSE BLD-MCNC: 83 MG/DL (ref 70–99)
HDLC SERPL-MCNC: 40 MG/DL (ref 40–60)
IRON SATURATION: 24 % (ref 20–50)
IRON: 47 UG/DL (ref 59–158)
LDL CHOLESTEROL CALCULATED: 43 MG/DL
MAGNESIUM: 1.7 MG/DL (ref 1.8–2.4)
PERFORMED ON: ABNORMAL
PERFORMED ON: NORMAL
POTASSIUM SERPL-SCNC: 3.7 MMOL/L (ref 3.5–5.1)
SODIUM BLD-SCNC: 141 MMOL/L (ref 136–145)
TOTAL IRON BINDING CAPACITY: 195 UG/DL (ref 260–445)
TRIGL SERPL-MCNC: 57 MG/DL (ref 0–150)
VITAMIN B-12: 293 PG/ML (ref 211–911)
VLDLC SERPL CALC-MCNC: 11 MG/DL

## 2022-05-01 PROCEDURE — 2580000003 HC RX 258: Performed by: INTERNAL MEDICINE

## 2022-05-01 PROCEDURE — 6370000000 HC RX 637 (ALT 250 FOR IP): Performed by: INTERNAL MEDICINE

## 2022-05-01 PROCEDURE — 36415 COLL VENOUS BLD VENIPUNCTURE: CPT

## 2022-05-01 PROCEDURE — 93010 ELECTROCARDIOGRAM REPORT: CPT | Performed by: INTERNAL MEDICINE

## 2022-05-01 PROCEDURE — 80048 BASIC METABOLIC PNL TOTAL CA: CPT

## 2022-05-01 PROCEDURE — 6360000002 HC RX W HCPCS: Performed by: INTERNAL MEDICINE

## 2022-05-01 PROCEDURE — 80061 LIPID PANEL: CPT

## 2022-05-01 PROCEDURE — 82746 ASSAY OF FOLIC ACID SERUM: CPT

## 2022-05-01 PROCEDURE — 82728 ASSAY OF FERRITIN: CPT

## 2022-05-01 PROCEDURE — 83540 ASSAY OF IRON: CPT

## 2022-05-01 PROCEDURE — 82607 VITAMIN B-12: CPT

## 2022-05-01 PROCEDURE — 83735 ASSAY OF MAGNESIUM: CPT

## 2022-05-01 PROCEDURE — 94760 N-INVAS EAR/PLS OXIMETRY 1: CPT

## 2022-05-01 PROCEDURE — 83550 IRON BINDING TEST: CPT

## 2022-05-01 PROCEDURE — 1200000000 HC SEMI PRIVATE

## 2022-05-01 PROCEDURE — 99233 SBSQ HOSP IP/OBS HIGH 50: CPT | Performed by: INTERNAL MEDICINE

## 2022-05-01 RX ORDER — MAGNESIUM SULFATE IN WATER 40 MG/ML
2000 INJECTION, SOLUTION INTRAVENOUS ONCE
Status: COMPLETED | OUTPATIENT
Start: 2022-05-01 | End: 2022-05-01

## 2022-05-01 RX ORDER — POTASSIUM CHLORIDE 20 MEQ/1
40 TABLET, EXTENDED RELEASE ORAL ONCE
Status: COMPLETED | OUTPATIENT
Start: 2022-05-01 | End: 2022-05-01

## 2022-05-01 RX ADMIN — LISINOPRIL 10 MG: 10 TABLET ORAL at 09:14

## 2022-05-01 RX ADMIN — MAGNESIUM SULFATE HEPTAHYDRATE 2000 MG: 40 INJECTION, SOLUTION INTRAVENOUS at 09:04

## 2022-05-01 RX ADMIN — POTASSIUM CHLORIDE 40 MEQ: 20 TABLET, EXTENDED RELEASE ORAL at 09:14

## 2022-05-01 RX ADMIN — ENOXAPARIN SODIUM 40 MG: 100 INJECTION SUBCUTANEOUS at 09:13

## 2022-05-01 RX ADMIN — FUROSEMIDE 40 MG: 10 INJECTION, SOLUTION INTRAMUSCULAR; INTRAVENOUS at 17:13

## 2022-05-01 RX ADMIN — MICONAZOLE NITRATE: 2 POWDER TOPICAL at 20:34

## 2022-05-01 RX ADMIN — FUROSEMIDE 40 MG: 10 INJECTION, SOLUTION INTRAMUSCULAR; INTRAVENOUS at 09:13

## 2022-05-01 RX ADMIN — OXYCODONE HYDROCHLORIDE AND ACETAMINOPHEN 500 MG: 500 TABLET ORAL at 09:14

## 2022-05-01 RX ADMIN — Medication 10 ML: at 20:34

## 2022-05-01 RX ADMIN — METOPROLOL SUCCINATE 25 MG: 25 TABLET, EXTENDED RELEASE ORAL at 09:14

## 2022-05-01 RX ADMIN — ASPIRIN 81 MG: 81 TABLET, COATED ORAL at 09:14

## 2022-05-01 RX ADMIN — ATORVASTATIN CALCIUM 10 MG: 10 TABLET, FILM COATED ORAL at 09:14

## 2022-05-01 RX ADMIN — TAMSULOSIN HYDROCHLORIDE 0.8 MG: 0.4 CAPSULE ORAL at 09:14

## 2022-05-01 RX ADMIN — MICONAZOLE NITRATE: 2 POWDER TOPICAL at 09:14

## 2022-05-01 RX ADMIN — INSULIN LISPRO 1 UNITS: 100 INJECTION, SOLUTION INTRAVENOUS; SUBCUTANEOUS at 20:34

## 2022-05-01 ASSESSMENT — PAIN SCALES - GENERAL
PAINLEVEL_OUTOF10: 0
PAINLEVEL_OUTOF10: 0

## 2022-05-01 NOTE — PROGRESS NOTES
Patient standing up on bedside to use urinal many times this shift - patient has had very good output. Patient states that he believes that his scrotal swelling is starting to decrease and legs are weeping less and appear more dry. Patient vitals remain stable and patient on 3 L nasal cannula.

## 2022-05-01 NOTE — PROGRESS NOTES
Referring Physician: Dr. Maria Isabel Multani  Reason for Consultation: CHF exacerbation  Chief Complaint: Short of breath    Subjective:   History of Present Illness:  Linda Morgan is a 80 y.o. patient who presented to the hospital with complaints of progressive shortness of breath, lower extremity edema, and scrotal edema. The patient typically receives his cardiac care through [facility]. His daughter is present bedside. He believes his symptoms only started over the past few days but later acknowledges that he does not particularly pay attention to the swelling in his legs. He began noticing difficulty in urinating secondary to the edema a few days ago. He denies associated chest pains. He is uncertain if he has gained weight. He reports compliance with his medications. He has known to have a large abdominal aortic aneurysm but his daughter says that he refused surgery. He also had coronary bypass >20 years ago. He lives at home alone. He endorses chronic dyspnea on exertion but is not particularly active with his advanced age. He denies PND or orthopnea but is short of breath with minimal activity. Interval history:  No acute overnight cardiac events. Patient feels shortness of breath is improving with IV diuretics. He is uncertain if the lower extremity edema has changed. He denies associated chest pains, lightheadedness, palpitations, PND, or orthopnea. Past Medical History:   has a past medical history of Diabetes mellitus ([address] Hyperlipidemia, Hypertension, and MI (myocardial infarction) ([address] Surgical History:   has a past surgical history that includes Cardiac surgery and hernia repair. Social History:   reports that he quit smoking about 23 years ago. His smoking use included cigarettes. He does not have any smokeless tobacco history on file. He reports that he does not drink alcohol and does not use drugs. Family History:  Denies premature coronary atherosclerosis.     Home Medications:  Were reviewed and are listed in nursing record and/or below  Prior to Admission medications    Medication Sig Start Date End Date Taking? Authorizing Provider   linagliptin (TRADJENTA) 5 MG tablet Take 5 mg by mouth daily   Yes Historical Provider, MD   NIFEdipine (NIFEDICAL XL) 30 MG CR tablet Take 30 mg by mouth daily. Historical Provider, MD   lisinopril-hydroCHLOROthiazide (PRINZIDE;ZESTORETIC) 20-25 MG per tablet Take 0.5 tablets by mouth daily     Historical Provider, MD   metFORMIN ER (GLUCOPHAGE-XR) 750 MG XR tablet Take 1,500 mg by mouth daily (with breakfast) 2 tabs in morning     Historical Provider, MD   atorvastatin (LIPITOR) 10 MG tablet Take 10 mg by mouth daily. Historical Provider, MD   atenolol (TENORMIN) 25 MG tablet Take 50 mg by mouth daily 2 tabs in am     Historical Provider, MD   tamsulosin (FLOMAX) 0.4 MG capsule Take 0.8 mg by mouth daily     Historical Provider, MD   aspirin 81 MG EC tablet Take 81 mg by mouth daily. Historical Provider, MD   vitamin E 400 UNIT capsule Take 400 Units by mouth daily. Historical Provider, MD   niacin 100 MG tablet Take 100 mg by mouth daily (with breakfast). Historical Provider, MD   Omega-3 Fatty Acids (FISH OIL) 1000 MG CAPS Take 3,000 mg by mouth 3 times daily. Historical Provider, MD   ascorbic acid (VITAMIN C) 500 MG tablet Take 500 mg by mouth daily.     Historical Provider, MD      CURRENT Medications:  magnesium sulfate 2000 mg in 50 mL IVPB premix, Once  potassium chloride (KLOR-CON M) extended release tablet 40 mEq, Once  ascorbic acid (VITAMIN C) tablet 500 mg, Daily  aspirin EC tablet 81 mg, Daily  atorvastatin (LIPITOR) tablet 10 mg, Daily  tamsulosin (FLOMAX) capsule 0.8 mg, Daily  sodium chloride flush 0.9 % injection 5-40 mL, 2 times per day  sodium chloride flush 0.9 % injection 5-40 mL, PRN  0.9 % sodium chloride infusion, PRN  ondansetron (ZOFRAN-ODT) disintegrating tablet 4 mg, Q8H PRN   Or  ondansetron (ZOFRAN) injection 4 mg, Q6H PRN  polyethylene glycol (GLYCOLAX) packet 17 g, Daily PRN  acetaminophen (TYLENOL) tablet 650 mg, Q6H PRN   Or  acetaminophen (TYLENOL) suppository 650 mg, Q6H PRN  enoxaparin (LOVENOX) injection 40 mg, Daily  metoprolol succinate (TOPROL XL) extended release tablet 25 mg, Daily  furosemide (LASIX) injection 40 mg, BID  miconazole (MICOTIN) 2 % powder, BID  insulin lispro (HUMALOG) injection vial 0-6 Units, TID WC  insulin lispro (HUMALOG) injection vial 0-3 Units, Nightly  glucose (GLUTOSE) 40 % oral gel 15 g, PRN  dextrose 50 % IV solution, PRN  glucagon (rDNA) injection 1 mg, PRN  dextrose 5 % solution, PRN  perflutren lipid microspheres (DEFINITY) injection 1.65 mg, ONCE PRN  lisinopril (PRINIVIL;ZESTRIL) tablet 10 mg, Daily    Allergies:  Penicillins     Review of Systems:   · Constitutional: no unanticipated weight loss. There's been no change in energy level, sleep pattern, or activity level. No fevers, chills. · Eyes: No visual changes or diplopia. No scleral icterus. · ENT: No Headaches, hearing loss or vertigo. No mouth sores or sore throat. · Cardiovascular: as reviewed in HPI  · Respiratory: No cough or wheezing, no sputum production. No hemoptysis. · Gastrointestinal: No abdominal pain, appetite loss, blood in stools. No change in bowel or bladder habits. · Genitourinary: No dysuria, trouble voiding, or hematuria. · Musculoskeletal:  No gait disturbance, no joint complaints. · Integumentary: No rash or pruritis. · Neurological: No headache, diplopia, change in muscle strength, numbness or tingling. · Psychiatric: No anxiety or depression. · Endocrine: No temperature intolerance. No excessive thirst, fluid intake, or urination. No tremor. · Hematologic/Lymphatic: No abnormal bruising or bleeding, blood clots or swollen lymph nodes. · Allergic/Immunologic: No nasal congestion or hives.     Objective:   PHYSICAL EXAM:    Vitals:    05/01/22 0800   BP: (!) 107/46 Pulse: 61   Resp: 16   Temp: 98.2 °F (36.8 °C)   SpO2: 92%    Weight: 201 lb 1 oz (91.2 kg)       General Appearance:  Alert, cooperative, no respiratory distress at rest.  Elderly. Head:  Normocephalic, without obvious abnormality, atraumatic. Eyes:  Pupils equal and round. No scleral icterus. Mouth: Moist mucosa, no pharyngeal erythema. Poor dentition. Nose: Nares normal. No drainage or sinus tenderness. Neck: Supple, symmetrical, trachea midline. No adenopathy. No tenderness/mass/nodules. No carotid bruit. JVD not appreciated today. Lungs:   Clear to auscultation bilaterally, respirations unlabored. No wheeze, rales, or rhonchi. Chest Wall:  No tenderness or deformity. Heart:  Regular rate. S1/S2 normal. No murmur, rub, or gallop. Abdomen:   Soft, non-tender, bowel sounds active. Musculoskeletal: No muscle wasting or digital clubbing. Extremities: Extremities normal, atraumatic. No cyanosis. 2+ BLE edema. Pulses: 2+ radial and carotid pulses, symmetric. Skin: No rashes or lesions. Pysch: Normal mood and affect. Alert and oriented x 4. Neurologic: Moves all extremities. Follows commands. Labs     CBC:   Lab Results   Component Value Date    WBC 7.4 04/30/2022    RBC 4.31 04/30/2022    HGB 12.0 04/30/2022    HCT 37.6 04/30/2022    MCV 87.3 04/30/2022    RDW 17.6 04/30/2022     04/30/2022     CMP:  Lab Results   Component Value Date     05/01/2022    K 3.7 05/01/2022    K 3.6 04/30/2022     05/01/2022    CO2 26 05/01/2022    BUN 14 05/01/2022    CREATININE 1.1 05/01/2022    GFRAA >60 05/01/2022    LABGLOM >60 05/01/2022    GLUCOSE 83 05/01/2022    CALCIUM 8.6 05/01/2022     PT/INR:  No results found for: PTINR  HgBA1c:No results found for: LABA1C  Lab Results   Component Value Date    TROPONINI <0.01 04/30/2022       Cardiac Data     EKG: Personally interpreted. 4/30/2022. Sinus rhythm with frequent and consecutive PVCs. Left axis deviation.   Poor R wave progression. Voltage criteria for LVH. Abdominal CT: 5/2017 Infrarenal abdominal aortic aneurysm measures up to 6.1 cm. Telemetry: Personally interpreted. Sinus with PVCs. Assessment and Plan   1) Acute on chronic systolic heart failure. EF unknown. NYHA III. Appears hypervolemic. Continue IV Lasix. Continue Toprol-XL and lisinopril. Discontinued home medications of hydrochlorothiazide and nifedipine. Will consider use of Aldactone pending findings on echocardiogram.  Discussion of SGLT2i can be deferred to the outpatient setting. Patient is not an ICD candidate with advanced age. Will obtain echocardiogram on Monday. 2) CAD s/p CABG. continue medical management risk factor modification including use of aspirin, statin, and beta-blocker. 3) AAA without rupture. Measured 6.1 cm five years ago but has refused intervention. Continue statin and attempts at strict blood pressure control. 4) Type 2 diabetes mellitus. Diabetes management per primary team.    5) Essential hypertension. Controlled. Goal BP <130/80. Continue medical therapy. Code status: The patient is alert and answers questions appropriately. He has capacity for medical decision-making. He endorses his wishes for DNR/DNI-CCA. Patient's daughter was present bedside for conversation and is in agreement. Overall, the problems requiring hospitalization are high in severity. Thank you for allowing us to participate in the care of [address] [place] [address]. Padmini Jara, [address] [place] [address] Road  5/1/2022 8:13 AM

## 2022-05-01 NOTE — PROGRESS NOTES
Hospitalist Progress Note      PCP: Hanna Gaston MD    Date of Admission: 4/30/2022    Chief Complaint: Difficulty urinating and leg swelling    Hospital Course: 77-year-old male admitted to the hospital with CHF. Initial symptoms were difficulty urinating and leg swelling  Cardiology consulted  Receiving IV Lasix  Subjective: Feels better today. Sitting up. Not having orthopnea      Medications:  Reviewed    Infusion Medications    sodium chloride      dextrose       Scheduled Medications    ascorbic acid  500 mg Oral Daily    aspirin  81 mg Oral Daily    atorvastatin  10 mg Oral Daily    tamsulosin  0.8 mg Oral Daily    sodium chloride flush  5-40 mL IntraVENous 2 times per day    enoxaparin  40 mg SubCUTAneous Daily    metoprolol succinate  25 mg Oral Daily    furosemide  40 mg IntraVENous BID    miconazole   Topical BID    insulin lispro  0-6 Units SubCUTAneous TID WC    insulin lispro  0-3 Units SubCUTAneous Nightly    lisinopril  10 mg Oral Daily     PRN Meds: sodium chloride flush, sodium chloride, ondansetron **OR** ondansetron, polyethylene glycol, acetaminophen **OR** acetaminophen, glucose, dextrose, glucagon (rDNA), dextrose, perflutren lipid microspheres      Intake/Output Summary (Last 24 hours) at 5/1/2022 1443  Last data filed at 5/1/2022 0923  Gross per 24 hour   Intake --   Output 1570 ml   Net -1570 ml       Physical Exam Performed:    /82   Pulse 90   Temp 98.2 °F (36.8 °C) (Oral)   Resp 28   Ht 6' 2\" (1.88 m)   Wt 201 lb 1 oz (91.2 kg)   SpO2 95%   BMI 25.81 kg/m²     General appearance: Frail appearing male sitting in stretcher no acute distress  HEENT: Pupils equal, round, and reactive to light. Conjunctivae/corneas clear. Neck: Supple, with full range of motion. No jugular venous distention. Trachea midline. Respiratory:  Normal respiratory effort. Clear to auscultation, bilaterally without Rales/Wheezes/Rhonchi.   Cardiovascular: Regular rate and rhythm with normal S1/S2 without murmurs, rubs or gallops. Abdomen: Soft, non-tender, non-distended with normal bowel sounds. Musculoskeletal: No clubbing, cyanosis or edema bilaterally. Full range of motion without deformity. Skin: Skin color, texture, turgor normal.  No rashes or lesions. Neurologic:  Neurovascularly intact without any focal sensory/motor deficits. Cranial nerves: II-XII intact, grossly non-focal.  Psychiatric: Alert and oriented, thought content appropriate, normal insight  Capillary Refill: Brisk,3 seconds, normal   Peripheral Pulses: +2 palpable, equal bilaterally   Extremities: Slightly decreased erythema from 4/30/2022 exam less swollen    Labs:   Recent Labs     04/30/22  0755   WBC 7.4   HGB 12.0*   HCT 37.6*   *     Recent Labs     04/30/22  0755 05/01/22  0556    141   K 3.6 3.7    104   CO2 25 26   BUN 14 14   CREATININE 1.0 1.1   CALCIUM 8.7 8.6     No results for input(s): AST, ALT, BILIDIR, BILITOT, ALKPHOS in the last 72 hours. No results for input(s): INR in the last 72 hours. Recent Labs     04/30/22  0755 04/30/22  1049 04/30/22  1313   TROPONINI <0.01 <0.01 <0.01       Urinalysis:      Lab Results   Component Value Date    NITRU Negative 04/30/2022    WBCUA 12 04/30/2022    BACTERIA None Seen 04/30/2022    RBCUA 3 04/30/2022    BLOODU Trace 04/30/2022    SPECGRAV 1.013 04/30/2022    GLUCOSEU Negative 04/30/2022       Radiology:  XR CHEST (2 VW)   Final Result   Pulmonary edema and small bilateral pleural effusions.                  Assessment/Plan:    Active Hospital Problems    Diagnosis     Acute on chronic systolic heart failure (HCC) [I50.23]      Priority: Medium    AAA (abdominal aortic aneurysm) without rupture (HCC) [I71.4]      Priority: Medium    Coronary artery disease involving native coronary artery of native heart without angina pectoris [I25.10]      Priority: Medium    Type 2 diabetes mellitus with hyperglycemia, without long-term current use of insulin ([facility] [address] [E11.65]      Priority: Medium    Essential hypertension [I10]      Priority: Medium     CHF exacerbation-new onset  Unclear if systolic or diastolic   Echo ordered for tomorrow  Continue IV diuresis  He is down about 3 pounds since yesterday  Cardiology recommendations noted  On beta-blocker Toprol XL  Palliative care consult to address goals of care    Diabetes mellitus type 2-controlled with insulin  Metformin on hold for now    Abdominal aortic aneurysm  Medical management with hypertension control      DVT Prophylaxis: lovenox  Diet: ADULT DIET; Regular; 4 carb choices (60 gm/meal);  Low Sodium (2 gm)  Code Status: DNR-CCA      Dispo - 2-3     Eyal Washington MD

## 2022-05-01 NOTE — PLAN OF CARE
Problem: Discharge Planning  Goal: Discharge to home or other facility with appropriate resources  Outcome: Progressing     Problem: Skin/Tissue Integrity  Goal: Absence of new skin breakdown  Description: 1. Monitor for areas of redness and/or skin breakdown  2. Assess vascular access sites hourly  3. Every 4-6 hours minimum:  Change oxygen saturation probe site  4. Every 4-6 hours:  If on nasal continuous positive airway pressure, respiratory therapy assess nares and determine need for appliance change or resting period. Outcome: Progressing  Note: Skin assessment completed every shift. Pt assessed for incontinence, appropriate barrier cream applied prn. Pt encouraged to turn/rotate every 2 hours. Assistance provided if pt unable to do so themselves. Previous breakdown assessed this shift. Problem: Safety - Adult  Goal: Free from fall injury  Outcome: Progressing  Note: Bed alarm on, bed in lowest position, wheels locked. Fall risk assessment completed every shift. Nonskid socks on, fall sign posted. Pt educated on use of call light. Problem: ABCDS Injury Assessment  Goal: Absence of physical injury  Outcome: Progressing  Note: No physical injury this shift.

## 2022-05-02 LAB
ANION GAP SERPL CALCULATED.3IONS-SCNC: 10 MMOL/L (ref 3–16)
BUN BLDV-MCNC: 19 MG/DL (ref 7–20)
CALCIUM SERPL-MCNC: 8.1 MG/DL (ref 8.3–10.6)
CHLORIDE BLD-SCNC: 104 MMOL/L (ref 99–110)
CO2: 27 MMOL/L (ref 21–32)
CREAT SERPL-MCNC: 1 MG/DL (ref 0.8–1.3)
GFR AFRICAN AMERICAN: >60
GFR NON-AFRICAN AMERICAN: >60
GLUCOSE BLD-MCNC: 100 MG/DL (ref 70–99)
GLUCOSE BLD-MCNC: 106 MG/DL (ref 70–99)
GLUCOSE BLD-MCNC: 95 MG/DL (ref 70–99)
GLUCOSE BLD-MCNC: 97 MG/DL (ref 70–99)
LV EF: 33 %
LVEF MODALITY: NORMAL
MAGNESIUM: 1.9 MG/DL (ref 1.8–2.4)
PERFORMED ON: ABNORMAL
PERFORMED ON: ABNORMAL
PERFORMED ON: NORMAL
POTASSIUM SERPL-SCNC: 3.4 MMOL/L (ref 3.5–5.1)
SODIUM BLD-SCNC: 141 MMOL/L (ref 136–145)

## 2022-05-02 PROCEDURE — 36415 COLL VENOUS BLD VENIPUNCTURE: CPT

## 2022-05-02 PROCEDURE — 1200000000 HC SEMI PRIVATE

## 2022-05-02 PROCEDURE — 83735 ASSAY OF MAGNESIUM: CPT

## 2022-05-02 PROCEDURE — 99233 SBSQ HOSP IP/OBS HIGH 50: CPT | Performed by: INTERNAL MEDICINE

## 2022-05-02 PROCEDURE — 94760 N-INVAS EAR/PLS OXIMETRY 1: CPT

## 2022-05-02 PROCEDURE — 6360000002 HC RX W HCPCS: Performed by: INTERNAL MEDICINE

## 2022-05-02 PROCEDURE — 97530 THERAPEUTIC ACTIVITIES: CPT

## 2022-05-02 PROCEDURE — 93306 TTE W/DOPPLER COMPLETE: CPT

## 2022-05-02 PROCEDURE — 97116 GAIT TRAINING THERAPY: CPT

## 2022-05-02 PROCEDURE — 80048 BASIC METABOLIC PNL TOTAL CA: CPT

## 2022-05-02 PROCEDURE — 2580000003 HC RX 258: Performed by: INTERNAL MEDICINE

## 2022-05-02 PROCEDURE — 97162 PT EVAL MOD COMPLEX 30 MIN: CPT

## 2022-05-02 PROCEDURE — 6370000000 HC RX 637 (ALT 250 FOR IP): Performed by: INTERNAL MEDICINE

## 2022-05-02 RX ORDER — SPIRONOLACTONE 25 MG/1
12.5 TABLET ORAL DAILY
Status: DISCONTINUED | OUTPATIENT
Start: 2022-05-02 | End: 2022-05-05 | Stop reason: HOSPADM

## 2022-05-02 RX ORDER — POTASSIUM CHLORIDE 20 MEQ/1
40 TABLET, EXTENDED RELEASE ORAL ONCE
Status: COMPLETED | OUTPATIENT
Start: 2022-05-02 | End: 2022-05-02

## 2022-05-02 RX ORDER — FUROSEMIDE 10 MG/ML
60 INJECTION INTRAMUSCULAR; INTRAVENOUS 2 TIMES DAILY
Status: DISCONTINUED | OUTPATIENT
Start: 2022-05-02 | End: 2022-05-04

## 2022-05-02 RX ADMIN — ATORVASTATIN CALCIUM 10 MG: 10 TABLET, FILM COATED ORAL at 07:53

## 2022-05-02 RX ADMIN — MICONAZOLE NITRATE: 2 POWDER TOPICAL at 20:27

## 2022-05-02 RX ADMIN — TAMSULOSIN HYDROCHLORIDE 0.8 MG: 0.4 CAPSULE ORAL at 07:53

## 2022-05-02 RX ADMIN — ASPIRIN 81 MG: 81 TABLET, COATED ORAL at 07:53

## 2022-05-02 RX ADMIN — MICONAZOLE NITRATE: 2 POWDER TOPICAL at 07:53

## 2022-05-02 RX ADMIN — METOPROLOL SUCCINATE 25 MG: 25 TABLET, EXTENDED RELEASE ORAL at 07:53

## 2022-05-02 RX ADMIN — FUROSEMIDE 40 MG: 10 INJECTION, SOLUTION INTRAMUSCULAR; INTRAVENOUS at 07:53

## 2022-05-02 RX ADMIN — FUROSEMIDE 60 MG: 10 INJECTION, SOLUTION INTRAMUSCULAR; INTRAVENOUS at 17:53

## 2022-05-02 RX ADMIN — LISINOPRIL 10 MG: 10 TABLET ORAL at 07:53

## 2022-05-02 RX ADMIN — Medication 10 ML: at 07:56

## 2022-05-02 RX ADMIN — ENOXAPARIN SODIUM 40 MG: 100 INJECTION SUBCUTANEOUS at 07:53

## 2022-05-02 RX ADMIN — SPIRONOLACTONE 12.5 MG: 25 TABLET ORAL at 14:26

## 2022-05-02 RX ADMIN — OXYCODONE HYDROCHLORIDE AND ACETAMINOPHEN 500 MG: 500 TABLET ORAL at 07:52

## 2022-05-02 RX ADMIN — POTASSIUM CHLORIDE 40 MEQ: 20 TABLET, EXTENDED RELEASE ORAL at 12:38

## 2022-05-02 ASSESSMENT — PAIN SCALES - GENERAL
PAINLEVEL_OUTOF10: 0

## 2022-05-02 ASSESSMENT — ENCOUNTER SYMPTOMS
RESPIRATORY NEGATIVE: 1
EYES NEGATIVE: 1
GASTROINTESTINAL NEGATIVE: 1

## 2022-05-02 NOTE — PROGRESS NOTES
Physical Therapy  Facility/Department: [facility] PROGRESSIVE CARE  Physical Therapy Initial Assessment    Name: Jayleen Vera Sr.  : 1933  MRN: [id number]  Date of Service: 2022    Discharge Recommendations:  Continue to assess pending progress,Home with Home health PT   PT Equipment Recommendations  Other: Will monitor for potential equipt needs. Would recommend an Alert System. Jayleen Vera Sr. scored a 19/24 on the AM-PAC short mobility form. Current research shows that an AM-PAC score of 18 or greater is typically associated with a discharge to the patient's home setting. Based on the patient's AM-PAC score and their current functional mobility deficits, it is recommended that the patient have 2-3 sessions per week of Physical Therapy at d/c to increase the patient's independence. At this time, this patient demonstrates the endurance and safety to discharge home with Home PT Evaluation  and a follow up treatment frequency of 2-3x/wk. Please see assessment section for further patient specific details. If patient discharges prior to next session this note will serve as a discharge summary. Please see below for the latest assessment towards goals. Assessment   Body Structures, Functions, Activity Limitations Requiring Skilled Therapeutic Intervention: Decreased functional mobility ; Decreased endurance;Decreased balance  Assessment: 79 y/o male admit 2022 with CHF, Cellulitis of Perineum, and Scrotal/LE Edema. PMH as noted including AAA, MI, CAD, CABG, Hernia Repair. PTA pt living alone in multi level home; steps to access garage to main level and then stair lift to 2nd floor bed/bath; independent daily care and functional mobility (with cane). At this time, pt reports plan to return home; would recommend Home PT. Will monitor pt's progress.   Therapy Prognosis: Good  Decision Making: Medium Complexity  History: 79 y/o male admit 2022 with CHF, Cellulitis of Perineum, and Scrotal/LE Edema. PMH as noted including AAA, MI, CAD, CABG, Hernia Repair. Exam: See above. Clinical Presentation: See above. Barriers to Learning: [place]. Requires PT Follow-Up: Yes  Activity Tolerance  Activity Tolerance: Patient tolerated treatment well     Plan   Plan  Plan: 3-5 times per week  Current Treatment Recommendations: Strengthening,Functional mobility training,Transfer training,Gait training,Stair training,Safety education & training,Patient/Caregiver education & training  Safety Devices  Type of Devices: Call light within reach,Chair alarm in place,Left in chair,Nurse notified     Restrictions  Restrictions/Precautions  Restrictions/Precautions: Fall Risk     Subjective   General  Chart Reviewed: Yes  Patient assessed for rehabilitation services?: Yes  Additional Pertinent Hx: 79 y/o male admit 4/30/2022 with CHF, Cellulitis of Perineum, and Scrotal/LE Edema. PMH as noted including AAA, MI, CAD, CABG, Hernia Repair. Family / Caregiver Present: No  Referring Practitioner: Dr. Mark Dennis: Within Functional Limits  Subjective  Subjective: Pt agreeable to PT Eval/Rx. Social/Functional History  Social/Functional History  Lives With: Alone  Type of Home: House  Home Layout: Multi-level,Bed/Bath upstairs,1/2 bath on main level,Laundry in basement  Home Access: Stairs to enter with rails,Level entry (Level entry garage to basement. 8-10 steps with handrail to main level.   Stairlift from main level to 2nd floor.)  Bathroom Shower/Tub: Tub/Shower unit  Bathroom Toilet: Standard  Bathroom Accessibility: Accessible  Home Equipment: Lift chair,Cane  Receives Help From: Family (Family occass assist with homemaking needs.)  ADL Assistance: Independent  Homemaking Assistance: Independent (Family occass assists although pt does take care of most of the time.)  Ambulation Assistance: Independent (With Efren Crown.)  Transfer Assistance: Independent  Active : Yes  Occupation: Retired  Type of Occupation: Worked at [employer]. Vision/Hearing  Hearing: Exceptions to [facility]  Hearing Exceptions: Hard of hearing/hearing concerns    Cognition   Orientation  Overall Orientation Status: Within Functional Limits  Cognition  Overall Cognitive Status: WFL     Objective      Observation/Palpation  Observation: Forward/Flexed C-Spine, Kyphotic T-Spine. AROM RLE (degrees)  RLE AROM: WFL  AROM LLE (degrees)  LLE AROM : WFL  AROM RUE (degrees)  RUE AROM : WFL  AROM LUE (degrees)  LUE AROM : WFL  Strength RLE  Comment: Grossly 4- 4/5; adequate for functional activities. Strength LLE  Comment: Grosslt 4- 4/5; adequate for functional activities. Bed mobility  Supine to Sit: Minimal assistance (HOB elevated.)  Transfers  Sit to Stand: Contact guard assistance (With Kevin Prophet.)  Stand to sit: Contact guard assistance (With Murphys Prophet.)  Ambulation  Surface: level tile  Device: Single point cane  Distance: Pt amb bed to chair 4-5 steps, additional 25' x 2 with Cane CGA. Flexed/Kyphotic Posture; foot flat/diminished clearance although no LE buckling/giving way. Alittle gait path veer at times although no specific LOB. Balance  Sitting - Static: Good  Sitting - Dynamic: Good  Standing - Static: Good;-  Standing - Dynamic: Fair;+  Comments: Pt stood at toilet few min to void without LOB. AM-PAC Score  -[facility] Inpatient Mobility Raw Score : 19 (05/02/22 1508)  AM-PAC Inpatient T-Scale Score : 45.44 (05/02/22 1508)  Mobility Inpatient CMS 0-100% Score: 41.77 (05/02/22 1508)  Mobility Inpatient CMS G-Code Modifier : CK (05/02/22 1508)          Goals  Short Term Goals  Time Frame for Short term goals: Upon d/c acute care setting. Short term goal 1: Bed Mob Independent. Short term goal 2: Transfers with assist device Supervision. Short term goal 3: Amb with assist device 50-75' SBA/Supervision. Short term goal 4: Stair Negotiation as approp. Patient Goals   Patient goals : Return home.        Therapy Time Individual Concurrent Group Co-treatment   Time In 124 [facility]         Minutes 508 [facility] Janet Lala

## 2022-05-02 NOTE — ACP (ADVANCE CARE PLANNING)
Advance Care Planning     Advance Care Planning Activator (Inpatient)  Conversation Note      Date of ACP Conversation: 5/2/2022     Conversation Conducted with: Patient with Decision Making Capacity    ACP Activator: Marisela Silverio 45 Decision Maker:     Current Designated Health Care Decision Maker:     Primary Decision Maker: Qing Bond Child - [phone]    Primary Decision Maker: Khris Bond Child - [phone]    Care Preferences    Ventilation: \"If you were in your present state of health and suddenly became very ill and were unable to breathe on your own, what would your preference be about the use of a ventilator (breathing machine) if it were available to you? \"      Would the patient desire the use of ventilator (breathing machine)?: no    \"If your health worsens and it becomes clear that your chance of recovery is unlikely, what would your preference be about the use of a ventilator (breathing machine) if it were available to you? \"     Would the patient desire the use of ventilator (breathing machine)?: No      Resuscitation  \"CPR works best to restart the heart when there is a sudden event, like a heart attack, in someone who is otherwise healthy. Unfortunately, CPR does not typically restart the heart for people who have serious health conditions or who are very sick. \"    \"In the event your heart stopped as a result of an underlying serious health condition, would you want attempts to be made to restart your heart (answer \"yes\" for attempt to resuscitate) or would you prefer a natural death (answer \"no\" for do not attempt to resuscitate)? \" no       [] Yes   [x] No   Educated Patient / Rebbecca Branch regarding differences between Advance Directives and portable DNR orders.     Length of ACP Conversation in minutes:  5    Conversation Outcomes:  [x] ACP discussion completed  [] Existing advance directive reviewed with patient; no changes to patient's previously recorded wishes  [] New Advance Directive completed  [] Portable Do Not Rescitate prepared for Provider review and signature  [] POLST/POST/MOLST/MOST prepared for Provider review and signature      Follow-up plan:    [] Schedule follow-up conversation to continue planning  [] Referred individual to Provider for additional questions/concerns   [] Advised patient/agent/surrogate to review completed ACP document and update if needed with changes in condition, patient preferences or care setting    [x] This note routed to one or more involved healthcare providers    Thierry ADAME RN  Case Management  [phone]    Electronically signed by Thierry Goodman RN on 5/2/2022 at 12:48 PM

## 2022-05-02 NOTE — PLAN OF CARE
Problem: Discharge Planning  Goal: Discharge to home or other facility with appropriate resources  Outcome: Progressing   Alert and oriented x4 Resp, easy and even Sats. WNL on RA Zlungs diminished in bases No c/o pain Cont.  Per urinal Up to chair and returned to bed with assistance Free from fall/injury Frequently turns and repositions self

## 2022-05-02 NOTE — CONSULTS
Nutrition Note    Consult \"HF diet guidelines\". Suspected HF, getting echo today. Will await for complete test. Will follow up for diet education tomorrow if HF confirmed.     Electronically signed by Paige Virgen RD, HIEU on 5/2/22 at 11:39 AM EDT    Contact: [phone]

## 2022-05-02 NOTE — CONSULTS
PALLIATIVE MEDICINE CONSULTATION     Patient name:Braeden Mejia Sr. FEP:[id number]    :1933  Room/Bed:A5A-4450/5276-01   LOS: 2 days         Date of consult:2022    Consult Information    Inpatient consult to Palliative Care  Consult performed by: LOWELL Neville CNP  Consult ordered by: Tramaine Shah MD         Reason for Consult: Goals of Care, Code Status    ASSESSMENT/RECOMMENDATIONS     80 y.o. male with debility and swelling. Symptom Management:  1. Debility: Patient was pale and lethargic during my visit. Patient did exhibit some generalized weakness during my visit. Patient requiring some assistance with his ADL's at this time. 2. Swelling: Patient taking Lasix 40 mg BID for relief. 3. Goals of Care: Met patient and his daughter [facility] at the bedside today. Patient was lethargic but appeared to be oriented x 4 today. Patient appeared to be decisional today. Reviewed patient's current health status, goals of care and code status. Patient indicated he would like to pursue all aggressive treatment at this time. Patient also indicated that once he is discharged home that he would like to meet with [facility] for assistance with in home care. Patient's daughter [facility] requested [facility] set up appointment with herself or her sister Avelino Quiroz for the patient. Code status was reviewed and the patient wishes to remain a DNR-CCA (set by Dr. Clara Duran on 2022). Patient/Family Goals of Care :     - Met patient and his daughter [facility] at the bedside today. Patient was lethargic but appeared to be oriented x 4 today. Patient appeared to be decisional today. Reviewed patient's current health status, goals of care and code status. Patient indicated he would like to pursue all aggressive treatment at this time. Patient also indicated that once he is discharged home that he would like to meet with [facility] for assistance with in home care.   Patient's daughter [facility] requested [facility] set up appointment with herself or her sister Brandie Caballero for the patient. Code status was reviewed and the patient wishes to remain a DNR-CCA (set by Dr. Curt Joyce on 4/30/2022). Disposition/Discharge Plan:   An outpatient [facility] referral was ordered for post-discharge to followup with the patient once they return home. Advance Directives:  · Surrogate Decision Maker: Per patient's daughter Yesy Morley (patient's daughter) and [facility] (patient's daughter) are primary medical contacts for the patient for decision making. [facility] indicated the patient does not have a living spouse but does have 3 children. · Code status:  DNR-CCA    Case discussed with: patient, floor RN, [facility] (daughter). Thank you for allowing us to participate in the care of this patient. HISTORY     CC: Debility  HPI: The patient is a 80 y.o. male with a past medical history of diabetes mellitus, hyperlipidemia, HTN, MI and CHF who presented to [facility] with leg swelling and difficulty with urination. Patient was admitted with acute congestive heart failure and cellulitis of perineum. Palliative Medicine SymptomScreening/ROS:    Review of Systems   Constitutional: Positive for fatigue. HENT: Negative. Eyes: Negative. Respiratory: Negative. Cardiovascular: Positive for leg swelling. Gastrointestinal: Negative. Musculoskeletal: Negative. Skin: Positive for pallor. Neurological: Positive for weakness. Psychiatric/Behavioral: Negative. All other systems reviewed and are negative. A complete 10 count ROS was obtained. Pertinent positives mentioned above in HPI/ROS. All others if not mentioned are negative. Palliative Performance Scale:     [] 60%  Amb reduced; Sig dz. Can't do hobbies/housework; Intake normal or reduced, Occasional assist; LOC full/confusion   [x] 50%  Mainly sit/lie; Extensive disease.  Mainly assist, Intake normal or reduced; Occasional assist; LOC full/confusion   [] 40%  Mainly in bed; Extensive disease; Mainly assist; Intake normal or reduced; Occasional assist; LOC full/confusion   [] 30%  Bed bound, Extensive disease; Total care; Intake reduced; LOC full/confusion   [] 20%  Bed bound; Extensive disease; Total care; Intake minimal; Drowsy/coma   [] 10%  Bed bound; Extensive disease; Total care; Mouth care only; Drowsy/coma   []  0%   Death     Home med list and hospital medications reviewed in chart as of 5/2/2022     EXAM     Vitals:    05/02/22 1425   BP: 115/63   Pulse: 83   Resp: 17   Temp: 98 °F (36.7 °C)   SpO2: 99%       Physical Exam  Vitals reviewed. Constitutional:       Appearance: He is ill-appearing. HENT:      Head: Normocephalic and atraumatic. Nose: Nose normal.   Eyes:      Extraocular Movements: Extraocular movements intact. Pupils: Pupils are equal, round, and reactive to light. Cardiovascular:      Rate and Rhythm: Normal rate. Pulses: Normal pulses. Pulmonary:      Comments: Breath sounds were diminished bilaterally today  Abdominal:      General: Bowel sounds are normal.      Palpations: Abdomen is soft. Musculoskeletal:      Cervical back: Neck supple. Right lower leg: Edema (3+) present. Left lower leg: Edema (3+) present. Skin:     General: Skin is warm and dry. Coloration: Skin is pale. Neurological:      Comments: Patient was oriented x 4 today. Psychiatric:         Behavior: Behavior normal.         Thought Content:  Thought content normal.         Judgment: Judgment normal.          Current labs in the epic chart reviewed as of 5/2/2022   Review of previous notes, admits, labs, radiology and testing relevant to this consult done in this chart today 5/2/2022      Total time: 80 minutes  >50% of time spent counseling patient at bedside or POA/family member if applicable , reviewing information and discussing care, coordinating with care team  Signed By: Electronically signed by LOWELL Dudley CNP on 5/2/2022 at 3:13 PM  Palliative Medicine   [phone]    May 2, 2022

## 2022-05-02 NOTE — PROGRESS NOTES
Referring Physician: Dr. Debbie Randhawa  Reason for Consultation: CHF exacerbation  Chief Complaint: Short of breath    Subjective:   History of Present Illness:  Ty Daigle is a 80 y.o. patient who presented to the hospital with complaints of progressive shortness of breath, lower extremity edema, and scrotal edema. The patient typically receives his cardiac care through [facility]. His daughter is present bedside. He believes his symptoms only started over the past few days but later acknowledges that he does not particularly pay attention to the swelling in his legs. He began noticing difficulty in urinating secondary to the edema a few days ago. He denies associated chest pains. He is uncertain if he has gained weight. He reports compliance with his medications. He has known to have a large abdominal aortic aneurysm but his daughter says that he refused surgery. He also had coronary bypass >20 years ago. He lives at home alone. He endorses chronic dyspnea on exertion but is not particularly active with his advanced age. He denies PND or orthopnea but is short of breath with minimal activity. Interval history:  No acute overnight cardiac events. Patient feels shortness of breath is improving with IV diuretics. He is uncertain if the lower extremity edema has changed. He denies associated chest pains, lightheadedness, palpitations, PND, or orthopnea. Past Medical History:   has a past medical history of Diabetes mellitus ([address] Hyperlipidemia, Hypertension, and MI (myocardial infarction) ([facility] Surgical History:   has a past surgical history that includes Cardiac surgery and hernia repair. Social History:   reports that he quit smoking about 23 years ago. His smoking use included cigarettes. He does not have any smokeless tobacco history on file. He reports that he does not drink alcohol and does not use drugs. Family History:  Denies premature coronary atherosclerosis.     Home Medications:  Were reviewed and are listed in nursing record and/or below  Prior to Admission medications    Medication Sig Start Date End Date Taking? Authorizing Provider   linagliptin (TRADJENTA) 5 MG tablet Take 5 mg by mouth daily   Yes Historical Provider, MD   NIFEdipine (NIFEDICAL XL) 30 MG CR tablet Take 30 mg by mouth daily. Historical Provider, MD   lisinopril-hydroCHLOROthiazide (PRINZIDE;ZESTORETIC) 20-25 MG per tablet Take 0.5 tablets by mouth daily     Historical Provider, MD   metFORMIN ER (GLUCOPHAGE-XR) 750 MG XR tablet Take 1,500 mg by mouth daily (with breakfast) 2 tabs in morning     Historical Provider, MD   atorvastatin (LIPITOR) 10 MG tablet Take 10 mg by mouth daily. Historical Provider, MD   atenolol (TENORMIN) 25 MG tablet Take 50 mg by mouth daily 2 tabs in am     Historical Provider, MD   tamsulosin (FLOMAX) 0.4 MG capsule Take 0.8 mg by mouth daily     Historical Provider, MD   aspirin 81 MG EC tablet Take 81 mg by mouth daily. Historical Provider, MD   vitamin E 400 UNIT capsule Take 400 Units by mouth daily. Historical Provider, MD   niacin 100 MG tablet Take 100 mg by mouth daily (with breakfast). Historical Provider, MD   Omega-3 Fatty Acids (FISH OIL) 1000 MG CAPS Take 3,000 mg by mouth 3 times daily. Historical Provider, MD   ascorbic acid (VITAMIN C) 500 MG tablet Take 500 mg by mouth daily.     Historical Provider, MD      CURRENT Medications:  ascorbic acid (VITAMIN C) tablet 500 mg, Daily  aspirin EC tablet 81 mg, Daily  atorvastatin (LIPITOR) tablet 10 mg, Daily  tamsulosin (FLOMAX) capsule 0.8 mg, Daily  sodium chloride flush 0.9 % injection 5-40 mL, 2 times per day  sodium chloride flush 0.9 % injection 5-40 mL, PRN  0.9 % sodium chloride infusion, PRN  ondansetron (ZOFRAN-ODT) disintegrating tablet 4 mg, Q8H PRN   Or  ondansetron (ZOFRAN) injection 4 mg, Q6H PRN  polyethylene glycol (GLYCOLAX) packet 17 g, Daily PRN  acetaminophen (TYLENOL) tablet 650 mg, Q6H PRN   Or  acetaminophen (TYLENOL) suppository 650 mg, Q6H PRN  enoxaparin (LOVENOX) injection 40 mg, Daily  metoprolol succinate (TOPROL XL) extended release tablet 25 mg, Daily  furosemide (LASIX) injection 40 mg, BID  miconazole (MICOTIN) 2 % powder, BID  insulin lispro (HUMALOG) injection vial 0-6 Units, TID WC  insulin lispro (HUMALOG) injection vial 0-3 Units, Nightly  glucose (GLUTOSE) 40 % oral gel 15 g, PRN  dextrose 50 % IV solution, PRN  glucagon (rDNA) injection 1 mg, PRN  dextrose 5 % solution, PRN  perflutren lipid microspheres (DEFINITY) injection 1.65 mg, ONCE PRN  lisinopril (PRINIVIL;ZESTRIL) tablet 10 mg, Daily    Allergies:  Penicillins     Review of Systems:   · Constitutional: no unanticipated weight loss. There's been no change in energy level, sleep pattern, or activity level. No fevers, chills. · Eyes: No visual changes or diplopia. No scleral icterus. · ENT: No Headaches, hearing loss or vertigo. No mouth sores or sore throat. · Cardiovascular: as reviewed in HPI  · Respiratory: No cough or wheezing, no sputum production. No hemoptysis. · Gastrointestinal: No abdominal pain, appetite loss, blood in stools. No change in bowel or bladder habits. · Genitourinary: No dysuria, trouble voiding, or hematuria. · Musculoskeletal:  No gait disturbance, no joint complaints. · Integumentary: No rash or pruritis. · Neurological: No headache, diplopia, change in muscle strength, numbness or tingling. · Psychiatric: No anxiety or depression. · Endocrine: No temperature intolerance. No excessive thirst, fluid intake, or urination. No tremor. · Hematologic/Lymphatic: No abnormal bruising or bleeding, blood clots or swollen lymph nodes. · Allergic/Immunologic: No nasal congestion or hives.     Objective:   PHYSICAL EXAM:    Vitals:    05/02/22 1108   BP: (!) 142/107   Pulse: 94   Resp: 18   Temp: 98.1 °F (36.7 °C)   SpO2: 98%    Weight: 188 lb 11.4 oz (85.6 kg)       General Appearance: Alert, cooperative, no respiratory distress at rest.  Elderly. Head:  Normocephalic, without obvious abnormality, atraumatic. Eyes:  Pupils equal and round. No scleral icterus. Mouth: Moist mucosa, no pharyngeal erythema. Poor dentition. Nose: Nares normal. No drainage or sinus tenderness. Neck: Supple, symmetrical, trachea midline. No adenopathy. No tenderness/mass/nodules. No carotid bruit. JVD not appreciated today. Lungs:   Clear to auscultation bilaterally, respirations unlabored. No wheeze, rales, or rhonchi. Chest Wall:  No tenderness or deformity. Heart:  Regular rate. S1/S2 normal. No murmur, rub, or gallop. Abdomen:   Soft, non-tender, bowel sounds active. Musculoskeletal: No muscle wasting or digital clubbing. Extremities: Extremities normal, atraumatic. No cyanosis. 2+ BLE edema. Pulses: 2+ radial and carotid pulses, symmetric. Skin: No rashes or lesions. Pysch: Normal mood and affect. Alert and oriented x 4. Neurologic: Moves all extremities. Follows commands. Labs     CBC:   Lab Results   Component Value Date    WBC 7.4 04/30/2022    RBC 4.31 04/30/2022    HGB 12.0 04/30/2022    HCT 37.6 04/30/2022    MCV 87.3 04/30/2022    RDW 17.6 04/30/2022     04/30/2022     CMP:  Lab Results   Component Value Date     05/02/2022    K 3.4 05/02/2022    K 3.6 04/30/2022     05/02/2022    CO2 27 05/02/2022    BUN 19 05/02/2022    CREATININE 1.0 05/02/2022    GFRAA >60 05/02/2022    LABGLOM >60 05/02/2022    GLUCOSE 95 05/02/2022    CALCIUM 8.1 05/02/2022     PT/INR:  No results found for: PTINR  HgBA1c:No results found for: LABA1C  Lab Results   Component Value Date    TROPONINI <0.01 04/30/2022     Cardiac Data     EKG: Personally interpreted. 4/30/2022. Sinus rhythm with frequent and consecutive PVCs. Left axis deviation. Poor R wave progression. Voltage criteria for LVH.     Abdominal CT: 5/2017 Infrarenal abdominal aortic aneurysm measures up to 6.1 cm.     Echocardiogram: Personally interpreted. 5/2/2022. Overall left ventricular systolic function appears moderate to severely reduced. Ejection fraction is visually estimated to be 30-35% with diffuse hypokinesis. Normal left ventricular wall thickness. Left ventricular cavity size is mildly dilated. Diastolic filling parameters suggest grade II diastolic dysfunction. The right ventricle appears mildly dilated with normal systolic function. The left atrium is severely dilated. Moderate tricuspid regurgitation. Mild mitral regurgitation. Trivial aortic regurgitation. Estimated pulmonary artery systolic pressure is moderately elevated at 53 mmHg assuming a right atrial pressure of 3 mmHg. Telemetry: Personally interpreted. Sinus with PVCs. Assessment and Plan   1) Acute on chronic systolic heart failure. EF 35%. NYHA III. Appears hypervolemic. Continue IV Lasix. Continue Toprol-XL and lisinopril. Discontinued home medications of hydrochlorothiazide and nifedipine. We will start Aldactone today. Discussion of SGLT2i can be deferred to the outpatient setting. Patient is not an ICD candidate with advanced age. 2) CAD s/p CABG. continue medical management risk factor modification including use of aspirin, statin, and beta-blocker. 3) AAA without rupture. Measured 6.1 cm five years ago but has refused intervention. Continue statin and attempts at strict blood pressure control. 4) Type 2 diabetes mellitus. Diabetes management per primary team.    5) Essential hypertension. Controlled. Goal BP <130/80. Continue medical therapy. Code status: The patient is alert and answers questions appropriately. He has capacity for medical decision-making. He endorses his wishes for DNR/DNICCA. Patient's daughter was present bedside for conversation and is in agreement. Overall, the problems requiring hospitalization are high in severity.      Thank you for allowing us to participate in the care of [address] Angel [place], [address]  5/2/2022 9:40 AM

## 2022-05-02 NOTE — PROGRESS NOTES
Patient's sats.  Have dropped a few times through out the night as low as the high 70's I have applied O2 for the second time my shift at 2L

## 2022-05-02 NOTE — PROGRESS NOTES
Hospitalist Progress Note      PCP: Juliette Freeman MD    Date of Admission: 4/30/2022    Chief Complaint: Difficulty urinating and leg swelling    Hospital Course: 80-year-old male admitted to the hospital with CHF. Initial symptoms were difficulty urinating and leg swelling  Cardiology consulted  Receiving IV Lasix  Subjective: no cp or sob. Sitting up. uneventul night      Medications:  Reviewed    Infusion Medications    sodium chloride      dextrose       Scheduled Medications    furosemide  60 mg IntraVENous BID    spironolactone  12.5 mg Oral Daily    ascorbic acid  500 mg Oral Daily    aspirin  81 mg Oral Daily    atorvastatin  10 mg Oral Daily    tamsulosin  0.8 mg Oral Daily    sodium chloride flush  5-40 mL IntraVENous 2 times per day    enoxaparin  40 mg SubCUTAneous Daily    metoprolol succinate  25 mg Oral Daily    miconazole   Topical BID    insulin lispro  0-6 Units SubCUTAneous TID WC    insulin lispro  0-3 Units SubCUTAneous Nightly    lisinopril  10 mg Oral Daily     PRN Meds: sodium chloride flush, sodium chloride, ondansetron **OR** ondansetron, polyethylene glycol, acetaminophen **OR** acetaminophen, glucose, dextrose, glucagon (rDNA), dextrose, perflutren lipid microspheres      Intake/Output Summary (Last 24 hours) at 5/2/2022 1504  Last data filed at 5/2/2022 1342  Gross per 24 hour   Intake 1248.29 ml   Output 2300 ml   Net -1051.71 ml       Physical Exam Performed:    /63   Pulse 83   Temp 98 °F (36.7 °C) (Axillary)   Resp 17   Ht 6' 2\" (1.88 m)   Wt 188 lb 4.4 oz (85.4 kg)   SpO2 99%   BMI 24.17 kg/m²     General appearance: Frail appearing male sitting in stretcher no acute distress  HEENT: Pupils equal, round, and reactive to light. Conjunctivae/corneas clear. Neck: Supple, with full range of motion. No jugular venous distention. Trachea midline. Respiratory:  Normal respiratory effort.  Clear to auscultation, bilaterally without Rales/Wheezes/Rhonchi. Cardiovascular: Regular rate and rhythm with normal S1/S2 without murmurs, rubs or gallops. Abdomen: Soft, non-tender, non-distended with normal bowel sounds. Musculoskeletal: No clubbing, cyanosis or edema bilaterally. Full range of motion without deformity. Skin: Skin color, texture, turgor normal.  No rashes or lesions. Neurologic:  Neurovascularly intact without any focal sensory/motor deficits. Cranial nerves: II-XII intact, grossly non-focal.  Psychiatric: Alert and oriented, thought content appropriate, normal insight  Capillary Refill: Brisk,3 seconds, normal   Peripheral Pulses: +2 palpable, equal bilaterally   Extremities: Slightly decreased erythema from 5/1/2022 exam less swollen    Labs:   Recent Labs     04/30/22  0755   WBC 7.4   HGB 12.0*   HCT 37.6*   *     Recent Labs     04/30/22  0755 05/01/22  0556 05/02/22  0545    141 141   K 3.6 3.7 3.4*    104 104   CO2 25 26 27   BUN 14 14 19   CREATININE 1.0 1.1 1.0   CALCIUM 8.7 8.6 8.1*     No results for input(s): AST, ALT, BILIDIR, BILITOT, ALKPHOS in the last 72 hours. No results for input(s): INR in the last 72 hours. Recent Labs     04/30/22  0755 04/30/22  1049 04/30/22  1313   TROPONINI <0.01 <0.01 <0.01       Urinalysis:      Lab Results   Component Value Date    NITRU Negative 04/30/2022    WBCUA 12 04/30/2022    BACTERIA None Seen 04/30/2022    RBCUA 3 04/30/2022    BLOODU Trace 04/30/2022    SPECGRAV 1.013 04/30/2022    GLUCOSEU Negative 04/30/2022       Radiology:  XR CHEST (2 VW)   Final Result   Pulmonary edema and small bilateral pleural effusions. Echo    Overall left ventricular systolic function appears moderate to severely    reduced. Ejection fraction is visually estimated to be 30-35% with diffuse    hypokinesis. Normal left ventricular wall thickness. Left ventricular cavity    size is mildly dilated. Diastolic filling parameters suggest grade II    diastolic dysfunction.  The right ventricle appears mildly dilated with normal systolic function.    The left atrium is severely dilated.    Moderate tricuspid regurgitation.    Mild mitral regurgitation.    Trivial aortic regurgitation.    Estimated pulmonary artery systolic pressure is moderately elevated at 53    mmHg assuming a right atrial pressure of 3 mmHg.           Assessment/Plan:    Active Hospital Problems    Diagnosis     Acute on chronic systolic heart failure ([facility]) [I50.23]      Priority: Medium    AAA (abdominal aortic aneurysm) without rupture ([facility]) [I71.4]      Priority: Medium    Coronary artery disease involving native coronary artery of native heart without angina pectoris [I25.10]      Priority: Medium    Type 2 diabetes mellitus with hyperglycemia, without long-term current use of insulin ([facility]) [E11.65]      Priority: Medium    Essential hypertension [I10]      Priority: Medium     Acute systolic and diastolic heart failure  EF low with lv dysfunction and diastolic dysfunction  Continue b-blocker   On ace  Aldactone added by Dr Reshma Limon      Diabetes mellitus type 2-controlled with insulin  Metformin on hold for now    Abdominal aortic aneurysm  Medical management with hypertension control      DVT Prophylaxis: lovenox  Diet: ADULT DIET; Regular; 4 carb choices (60 gm/meal);  Low Sodium (2 gm); 1500 ml  Code Status: DNR-CCA      Dispo - 1-2 d    Cristino Castro MD

## 2022-05-02 NOTE — CARE COORDINATION
ASSESSMENT NOTE    Attending Physician: Arik Salvador MD  Admit Problem: Cellulitis of perineum [W96.263]  CHF (congestive heart failure), NYHA class I, acute on chronic, combined ([facility] [address] [I50.43]  Acute congestive heart failure, unspecified heart failure type ([facility] [address] [I50.9]  Date/Time of Admission: 4/30/2022  7:20 AM  Problem List:  Patient Active Problem List   Diagnosis    Acute anterior epistaxis    Acute on chronic systolic heart failure ([facility]    AAA (abdominal aortic aneurysm) without rupture ([facility]    Coronary artery disease involving native coronary artery of native heart without angina pectoris    Type 2 diabetes mellitus with hyperglycemia, without long-term current use of insulin ([facility]    Essential hypertension       Service Assessment  Patient Orientation Alert and Oriented   Cognition Alert   History Provided By Patient,Child/Family (Patient off floor for ECHO prior to orthopedic surgery)   Primary Caregiver Self   Accompanied By/Relationship Daughter [address] [facility] (Has two daughters, Shruthi Villalpando and Jose M. Shruthi Villalpando lives closest.)   [address] [facility] is: Legal Next of Naina 69   PCP Verified by CM Yes   Last Visit to PCP Within last 6 months   Prior Functional Level Assistance with the following:,Housework   Current Functional Level Housework,Assistance with the following:   Can patient return to prior living arrangement Yes   Ability to make needs known: Good   Family able to assist with home care needs: Yes   Would you like for me to discuss the discharge plan with any other family members/significant others, and if so, who?  No   Financial Resources Medicare   Community Resources     CM/SW Referral       Social/Functional History  Lives With Alone   Type of Home House   Home Layout Two level   Home Access Stairs to enter without rails   Entrance Stairs - Number of Steps 4+1   Entrance Stairs - Rails     Bathroom Shower/Tub Tub/Shower unit   [employer] Standard   Bathroom Equipment Commode   Bathroom Accessibility Accessible   Home Equipment Lift chair Cane   Receives Help From Family   ADL Assistance Independent   Bath     Dressing     Grooming     Feeding     Toileting     [address] Work     Driving     Shopping          Other (Comment)     Homemaking Responsibilities     [employer] [address] Paying/Finance Responsibility     [address] Management     Other (Comment)     Ambuation Assistance Independent   Transfer Assisstance Independent   Active  Yes   Patient's  Info     Mode of Transportation Car   Education     Occupation Retired   Type of Occupation       Discharge Planning   Type of [address] (Has two daughters, Julia Jain and Jose M. Julia Jain lives closest.)   Current Services Prior To Admission None   Potential [id number] [facility] (Possible HHC for PT/OT. Waiting for PT/OT evals)   DME     DME Tino Blake (May require a walker, waiting for PT/OT evals)   DME Ordered? No   Potential Assistance Purchasing Medications No   Meds-to-Beds: Does the patient want to have any new prescriptions delivered to bedside prior to discharge? Type of Home Care Services     Patient expects to be discharged to: House   Follow Up Appointment: Best Day/Time     One/Two Story Residence: Two story   # of Interior Steps     Height of Each Step (in)     [employer] Available Yes   History of Falls? No     Services At/After Discharge  Transition of Care Consult (CM Consult):      Internal Home Health     Internal Hospice     Reason Outside Agency [address]     Partner SNF     Reason Why Partner SNF Not Chosen     Internal Comfort Care     Reason Outside [address] Discharge     [address] Provided? Mode of Transport at Discharge Other (see comment) (Daughter Kimberly Seth will transport to home)   Hospital Transport Time of Discharge     Confirm Follow Up Transport       Condition of Participation: Discharge Planning  The plan for Transition of Care is related to the following treatment goals: Possible new onset of CHF   The Patient and/or Patient Representative was provided with a Choice of Provider? Name of the Patient Representative who was provided with the Choice of Provider and agrees with the Discharge Plan? The Patient and/or Patient Representative Agree with the Discharge Plan? Freedom of Choice list was provided with basic dialogue that supports the individualized plan of care/goals, treatment preferences, and shares the quality data associated with the providers? Documentation for Discharge Appeal  Discharge Appealed by     Date notified by [facility]IO of appeal request:     Time notified by QIO of appeal request:     Detailed Notice of Discharge given to:     Date Notice of Discharge given:     Time Notice of Discharge given:     Date records sent to [address]     Time records sent to [address]     Date Notified of Outcome     Time Notified of Outcome     Outcome of appeal       Patient is open to [facility] services if needed for PT/OT. Orders for PT/OT evaluations in place. Waiting for results.      Dale Raymundo RN 05/02/22 12:42 PM

## 2022-05-03 LAB
ANION GAP SERPL CALCULATED.3IONS-SCNC: 9 MMOL/L (ref 3–16)
BUN BLDV-MCNC: 20 MG/DL (ref 7–20)
CALCIUM SERPL-MCNC: 8.8 MG/DL (ref 8.3–10.6)
CHLORIDE BLD-SCNC: 102 MMOL/L (ref 99–110)
CO2: 30 MMOL/L (ref 21–32)
CREAT SERPL-MCNC: 1.1 MG/DL (ref 0.8–1.3)
GFR AFRICAN AMERICAN: >60
GFR NON-AFRICAN AMERICAN: >60
GLUCOSE BLD-MCNC: 127 MG/DL (ref 70–99)
GLUCOSE BLD-MCNC: 130 MG/DL (ref 70–99)
GLUCOSE BLD-MCNC: 158 MG/DL (ref 70–99)
GLUCOSE BLD-MCNC: 89 MG/DL (ref 70–99)
GLUCOSE BLD-MCNC: 91 MG/DL (ref 70–99)
MAGNESIUM: 1.9 MG/DL (ref 1.8–2.4)
PERFORMED ON: ABNORMAL
PERFORMED ON: NORMAL
POTASSIUM SERPL-SCNC: 3.9 MMOL/L (ref 3.5–5.1)
PRO-BNP: ABNORMAL PG/ML (ref 0–449)
SODIUM BLD-SCNC: 141 MMOL/L (ref 136–145)

## 2022-05-03 PROCEDURE — 6360000002 HC RX W HCPCS: Performed by: INTERNAL MEDICINE

## 2022-05-03 PROCEDURE — 2580000003 HC RX 258: Performed by: INTERNAL MEDICINE

## 2022-05-03 PROCEDURE — 36415 COLL VENOUS BLD VENIPUNCTURE: CPT

## 2022-05-03 PROCEDURE — 1200000000 HC SEMI PRIVATE

## 2022-05-03 PROCEDURE — 6370000000 HC RX 637 (ALT 250 FOR IP): Performed by: INTERNAL MEDICINE

## 2022-05-03 PROCEDURE — 97530 THERAPEUTIC ACTIVITIES: CPT

## 2022-05-03 PROCEDURE — 94760 N-INVAS EAR/PLS OXIMETRY 1: CPT

## 2022-05-03 PROCEDURE — 80048 BASIC METABOLIC PNL TOTAL CA: CPT

## 2022-05-03 PROCEDURE — 99233 SBSQ HOSP IP/OBS HIGH 50: CPT | Performed by: INTERNAL MEDICINE

## 2022-05-03 PROCEDURE — 83735 ASSAY OF MAGNESIUM: CPT

## 2022-05-03 PROCEDURE — 83880 ASSAY OF NATRIURETIC PEPTIDE: CPT

## 2022-05-03 PROCEDURE — 97165 OT EVAL LOW COMPLEX 30 MIN: CPT

## 2022-05-03 RX ORDER — CALCIUM CARBONATE 200(500)MG
500 TABLET,CHEWABLE ORAL 3 TIMES DAILY PRN
Status: DISCONTINUED | OUTPATIENT
Start: 2022-05-03 | End: 2022-05-05 | Stop reason: HOSPADM

## 2022-05-03 RX ADMIN — ANTACID TABLETS 500 MG: 500 TABLET, CHEWABLE ORAL at 12:51

## 2022-05-03 RX ADMIN — MICONAZOLE NITRATE: 2 POWDER TOPICAL at 20:47

## 2022-05-03 RX ADMIN — ENOXAPARIN SODIUM 40 MG: 100 INJECTION SUBCUTANEOUS at 08:03

## 2022-05-03 RX ADMIN — INSULIN LISPRO 1 UNITS: 100 INJECTION, SOLUTION INTRAVENOUS; SUBCUTANEOUS at 12:20

## 2022-05-03 RX ADMIN — Medication 10 ML: at 08:07

## 2022-05-03 RX ADMIN — ATORVASTATIN CALCIUM 10 MG: 10 TABLET, FILM COATED ORAL at 08:03

## 2022-05-03 RX ADMIN — Medication 10 ML: at 20:47

## 2022-05-03 RX ADMIN — FUROSEMIDE 60 MG: 10 INJECTION, SOLUTION INTRAMUSCULAR; INTRAVENOUS at 08:03

## 2022-05-03 RX ADMIN — ASPIRIN 81 MG: 81 TABLET, COATED ORAL at 08:02

## 2022-05-03 RX ADMIN — OXYCODONE HYDROCHLORIDE AND ACETAMINOPHEN 500 MG: 500 TABLET ORAL at 08:03

## 2022-05-03 RX ADMIN — FUROSEMIDE 60 MG: 10 INJECTION, SOLUTION INTRAMUSCULAR; INTRAVENOUS at 18:08

## 2022-05-03 RX ADMIN — METOPROLOL SUCCINATE 25 MG: 25 TABLET, EXTENDED RELEASE ORAL at 08:02

## 2022-05-03 RX ADMIN — LISINOPRIL 10 MG: 10 TABLET ORAL at 08:02

## 2022-05-03 RX ADMIN — MICONAZOLE NITRATE: 2 POWDER TOPICAL at 08:03

## 2022-05-03 RX ADMIN — TAMSULOSIN HYDROCHLORIDE 0.8 MG: 0.4 CAPSULE ORAL at 08:02

## 2022-05-03 RX ADMIN — SPIRONOLACTONE 12.5 MG: 25 TABLET ORAL at 08:02

## 2022-05-03 RX ADMIN — Medication 10 ML: at 00:02

## 2022-05-03 ASSESSMENT — PAIN SCALES - GENERAL
PAINLEVEL_OUTOF10: 0
PAINLEVEL_OUTOF10: 0

## 2022-05-03 NOTE — DISCHARGE INSTR - COC
Continuity of Care Form    Patient Name: Kiran Moss   :  1933  MRN:  [id number]    Admit date:  2022  Discharge date:  22    Code Status Order: DNR-CCA   Advance Directives:      Admitting Physician:  Cherri Ponce MD  PCP: Rafi Levin MD    Discharging Nurse: [facility] Unit/Room#: H7R-9204/2612-73  Discharging Unit Phone Number: [phone]    Emergency Contact:   Extended Emergency Contact Information  Primary Emergency Contact: [facility] [address] Phone: [phone]  Mobile Phone: [phone]  Relation: Child  Secondary Emergency Contact: [facility] Phone: [phone]  Mobile Phone: [phone]  Relation: Child    Past Surgical History:  Past Surgical History:   Procedure Laterality Date    CARDIAC SURGERY      CABGx5 vessels    HERNIA REPAIR      x 3       Immunization History: There is no immunization history on file for this patient.     Active Problems:  Patient Active Problem List   Diagnosis Code    Acute anterior epistaxis R04.0    Acute on chronic systolic heart failure ([facility]) I50.23    AAA (abdominal aortic aneurysm) without rupture ([facility]) I71.4    Coronary artery disease involving native coronary artery of native heart without angina pectoris I25.10    Type 2 diabetes mellitus with hyperglycemia, without long-term current use of insulin ([facility]) E11.65    Essential hypertension I10       Isolation/Infection:   Isolation            No Isolation          Patient Infection Status       None to display            Nurse Assessment:  Last Vital Signs: /69   Pulse 83   Temp 98 °F (36.7 °C) (Oral)   Resp 22   Ht 6' 2\" (1.88 m)   Wt 186 lb 1.1 oz (84.4 kg)   SpO2 96%   BMI 23.89 kg/m²     Last documented pain score (0-10 scale): Pain Level: 0  Last Weight:   Wt Readings from Last 1 Encounters:   22 186 lb 1.1 oz (84.4 kg)     Mental Status:  oriented and alert    IV Access:  - None    Nursing Mobility/ADLs:  Walking Assisted  Transfer  Assisted  Bathing  Assisted  Dressing  Assisted  Toileting  Assisted  Feeding  Independent  Med Admin  Assisted  Med Delivery   whole    Wound Care Documentation and Therapy:  Wound 04/30/22 Elbow Distal right elbow skin tear present upon arrival (Active)   Wound Etiology Skin Tear 05/03/22 0800   Dressing Status Intact; Old drainage noted 05/03/22 0800   Number of days: 3        Elimination:  Continence: Bowel: Yes  Bladder: Yes  Urinary Catheter: None   Colostomy/Ileostomy/Ileal Conduit: No       Date of Last BM: ***    Intake/Output Summary (Last 24 hours) at 5/3/2022 1644  Last data filed at 5/3/2022 1527  Gross per 24 hour   Intake 840 ml   Output 2325 ml   Net -1485 ml     I/O last 3 completed shifts: In: 1008.3 [P.O.:960; IV Piggyback:48.3]  Out: 3975 [Urine:3975]    Safety Concerns: At Risk for Falls    Impairments/Disabilities:      None    Nutrition Therapy:  Current Nutrition Therapy:   - Oral Diet:  Carb Control 4 carbs/meal (1800kcals/day) and Low Sodium (2gm)    Routes of Feeding: Oral  Liquids: Thin Liquids  Daily Fluid Restriction: yes - amount 1500ml  Last Modified Barium Swallow with Video (Video Swallowing Test): not done    Treatments at the Time of Hospital Discharge:   Respiratory Treatments:    Oxygen Therapy:  2l nasal cannula  Ventilator:    - No ventilator support    Rehab Therapies: Physical Therapy and Occupational Therapy  Weight Bearing Status/Restrictions: No weight bearing restrictions  Other Medical Equipment (for information only, NOT a DME order):  wheelchair and cane  Other Treatments: nystatin to groin    Heart Failure Instructions for Daily Management  Patient was treated for acute on chronic combined systolic and diastolic heart failure. he  will require the following:    Please weigh daily on the same scale and approximately the same time of day. Report weight gain of 3 pounds/day or 5 pounds/week to :  [address] [facility] [phone] and Binu Marin MD [phone]. Please use hospital discharge weight as baseline reference. Please monitor for signs and symptoms of and report to MD:  Worsening Heart Failure: sudden weight gain, shortness of breath, lower extremity or general edema/swelling, abdominal bloating/swelling, inability to lie flat, intolerance to usual activity, or cough (especially at night). Report these finding even if no increase in weight. Dehydration:  having difficulty or a decrease in urination, dizziness, worsening fatigue, or new onset/worsening of generalized weakness. Please continue a LOW SODIUM diet and LIMIT fluid intake to 48 - 64 ounces ( 1.5 - 2 liters) per day. Call [facility] [phone] and Vianey Sheikh [phone] and/or Sisi Mcpherson @ [phone] with any questions or concerns. Please continue heart failure education to patient and family/support system. See After Visit Summary for hospital follow up appointment details. Consider spiritual care referral for support and/or completion of advance directives [phone]. Consider: [place] [address] telehealth program if patient agreeable and able to participate, palliative care consult for ongoing goals of care, end of life, and/or chronic disease management discussions, and referral to [facility] ([phone]) once SNF/HHC complete.       Patient's personal belongings (please select all that are sent with patient):  [place]    RN SIGNATURE:  Electronically signed by Phyllis Mackey RN on 5/5/22 at 3:24 PM EDT    CASE MANAGEMENT/SOCIAL WORK SECTION    Inpatient Status Date: 5/5/2022    Readmission Risk Assessment Score:  Readmission Risk              Risk of Unplanned Readmission:  12         Discharging to Facility/ Agency   Name: [facility] Moore  Address:  [address] [place] [address]   Phone:  [phone]  Fax:  [phone]     / signature: Electronically signed by VINI Razo on 5/5/2022 at 1:39 PM      PHYSICIAN SECTION    Prognosis: Fair    Condition at Discharge: Stable    Rehab Potential (if transferring to Rehab): Fair    Recommended Labs or Other Treatments After Discharge: none    Physician Certification: I certify the above information and transfer of Pat Moss.  is necessary for the continuing treatment of the diagnosis listed and that he requires Home Care for less 30 days.      Update Admission H&P: No change in H&P    PHYSICIAN SIGNATURE:  Electronically signed by Katie Tang MD on 5/5/22 at 11:31 AM EDT

## 2022-05-03 NOTE — PROGRESS NOTES
Referring Physician: Dr. Jere Gamez  Reason for Consultation: CHF exacerbation  Chief Complaint: Short of breath    Subjective:   History of Present Illness:  Juan Christianson is a 80 y.o. patient who presented to the hospital with complaints of progressive shortness of breath, lower extremity edema, and scrotal edema. The patient typically receives his cardiac care through [facility]. His daughter is present bedside. He believes his symptoms only started over the past few days but later acknowledges that he does not particularly pay attention to the swelling in his legs. He began noticing difficulty in urinating secondary to the edema a few days ago. He denies associated chest pains. He is uncertain if he has gained weight. He reports compliance with his medications. He has known to have a large abdominal aortic aneurysm but his daughter says that he refused surgery. He also had coronary bypass >20 years ago. He lives at home alone. He endorses chronic dyspnea on exertion but is not particularly active with his advanced age. He denies PND or orthopnea but is short of breath with minimal activity. Interval history:  No acute overnight cardiac events. Patient feels shortness of breath is improving with IV diuretics. He notes the scrotal edema is improving and some slight improvement to the lower extremity edema. He denies associated chest pains, lightheadedness, palpitations, PND, or orthopnea. Past Medical History:   has a past medical history of Diabetes mellitus ([address] Hyperlipidemia, Hypertension, and MI (myocardial infarction) ([address] Surgical History:   has a past surgical history that includes Cardiac surgery and hernia repair. Social History:   reports that he quit smoking about 23 years ago. His smoking use included cigarettes. He does not have any smokeless tobacco history on file. He reports that he does not drink alcohol and does not use drugs.      Family History:  Denies premature coronary atherosclerosis. Home Medications:  Were reviewed and are listed in nursing record and/or below  Prior to Admission medications    Medication Sig Start Date End Date Taking? Authorizing Provider   linagliptin (TRADJENTA) 5 MG tablet Take 5 mg by mouth daily   Yes Historical Provider, MD   NIFEdipine (NIFEDICAL XL) 30 MG CR tablet Take 30 mg by mouth daily. Historical Provider, MD   lisinopril-hydroCHLOROthiazide (PRINZIDE;ZESTORETIC) 20-25 MG per tablet Take 0.5 tablets by mouth daily     Historical Provider, MD   metFORMIN ER (GLUCOPHAGE-XR) 750 MG XR tablet Take 1,500 mg by mouth daily (with breakfast) 2 tabs in morning     Historical Provider, MD   atorvastatin (LIPITOR) 10 MG tablet Take 10 mg by mouth daily. Historical Provider, MD   atenolol (TENORMIN) 25 MG tablet Take 50 mg by mouth daily 2 tabs in am     Historical Provider, MD   tamsulosin (FLOMAX) 0.4 MG capsule Take 0.8 mg by mouth daily     Historical Provider, MD   aspirin 81 MG EC tablet Take 81 mg by mouth daily. Historical Provider, MD   vitamin E 400 UNIT capsule Take 400 Units by mouth daily. Historical Provider, MD   niacin 100 MG tablet Take 100 mg by mouth daily (with breakfast). Historical Provider, MD   Omega-3 Fatty Acids (FISH OIL) 1000 MG CAPS Take 3,000 mg by mouth 3 times daily. Historical Provider, MD   ascorbic acid (VITAMIN C) 500 MG tablet Take 500 mg by mouth daily.     Historical Provider, MD      CURRENT Medications:  furosemide (LASIX) injection 60 mg, BID  spironolactone (ALDACTONE) tablet 12.5 mg, Daily  ascorbic acid (VITAMIN C) tablet 500 mg, Daily  aspirin EC tablet 81 mg, Daily  atorvastatin (LIPITOR) tablet 10 mg, Daily  tamsulosin (FLOMAX) capsule 0.8 mg, Daily  sodium chloride flush 0.9 % injection 5-40 mL, 2 times per day  sodium chloride flush 0.9 % injection 5-40 mL, PRN  0.9 % sodium chloride infusion, PRN  ondansetron (ZOFRAN-ODT) disintegrating tablet 4 mg, Q8H PRN Or  ondansetron (ZOFRAN) injection 4 mg, Q6H PRN  polyethylene glycol (GLYCOLAX) packet 17 g, Daily PRN  acetaminophen (TYLENOL) tablet 650 mg, Q6H PRN   Or  acetaminophen (TYLENOL) suppository 650 mg, Q6H PRN  enoxaparin (LOVENOX) injection 40 mg, Daily  metoprolol succinate (TOPROL XL) extended release tablet 25 mg, Daily  miconazole (MICOTIN) 2 % powder, BID  insulin lispro (HUMALOG) injection vial 0-6 Units, TID WC  insulin lispro (HUMALOG) injection vial 0-3 Units, Nightly  glucose (GLUTOSE) 40 % oral gel 15 g, PRN  dextrose 50 % IV solution, PRN  glucagon (rDNA) injection 1 mg, PRN  dextrose 5 % solution, PRN  perflutren lipid microspheres (DEFINITY) injection 1.65 mg, ONCE PRN  lisinopril (PRINIVIL;ZESTRIL) tablet 10 mg, Daily    Allergies:  Penicillins     Review of Systems:   · Constitutional: no unanticipated weight loss. There's been no change in energy level, sleep pattern, or activity level. No fevers, chills. · Eyes: No visual changes or diplopia. No scleral icterus. · ENT: No Headaches, hearing loss or vertigo. No mouth sores or sore throat. · Cardiovascular: as reviewed in HPI  · Respiratory: No cough or wheezing, no sputum production. No hemoptysis. · Gastrointestinal: No abdominal pain, appetite loss, blood in stools. No change in bowel or bladder habits. · Genitourinary: No dysuria, trouble voiding, or hematuria. · Musculoskeletal:  No gait disturbance, no joint complaints. · Integumentary: No rash or pruritis. · Neurological: No headache, diplopia, change in muscle strength, numbness or tingling. · Psychiatric: No anxiety or depression. · Endocrine: No temperature intolerance. No excessive thirst, fluid intake, or urination. No tremor. · Hematologic/Lymphatic: No abnormal bruising or bleeding, blood clots or swollen lymph nodes. · Allergic/Immunologic: No nasal congestion or hives.     Objective:   PHYSICAL EXAM:    Vitals:    05/03/22 0745   BP: 135/74   Pulse: 76   Resp: 20 Temp: 98.4 °F (36.9 °C)   SpO2: 94%    Weight: 186 lb 1.1 oz (84.4 kg)       General Appearance:  Alert, cooperative, no respiratory distress at rest.  Elderly. Head:  Normocephalic, without obvious abnormality, atraumatic. Eyes:  Pupils equal and round. No scleral icterus. Mouth: Moist mucosa, no pharyngeal erythema. Poor dentition. Nose: Nares normal. No drainage or sinus tenderness. Neck: Supple, symmetrical, trachea midline. No adenopathy. No tenderness/mass/nodules. No carotid bruit. JVD not appreciated today. Lungs:   Clear to auscultation bilaterally, respirations unlabored. No wheeze, rales, or rhonchi. Chest Wall:  No tenderness or deformity. Heart:  Regular rate. S1/S2 normal. No murmur, rub, or gallop. Abdomen:   Soft, non-tender, bowel sounds active. Musculoskeletal: No muscle wasting or digital clubbing. Extremities: Extremities normal, atraumatic. No cyanosis. 2+ BLE edema. Pulses: 2+ radial and carotid pulses, symmetric. Skin: No rashes or lesions. Pysch: Normal mood and affect. Alert and oriented x 4. Neurologic: Moves all extremities. Follows commands. Labs     CBC:   Lab Results   Component Value Date    WBC 7.4 04/30/2022    RBC 4.31 04/30/2022    HGB 12.0 04/30/2022    HCT 37.6 04/30/2022    MCV 87.3 04/30/2022    RDW 17.6 04/30/2022     04/30/2022     CMP:  Lab Results   Component Value Date     05/03/2022    K 3.9 05/03/2022    K 3.6 04/30/2022     05/03/2022    CO2 30 05/03/2022    BUN 20 05/03/2022    CREATININE 1.1 05/03/2022    GFRAA >60 05/03/2022    LABGLOM >60 05/03/2022    GLUCOSE 91 05/03/2022    CALCIUM 8.8 05/03/2022     PT/INR:  No results found for: PTINR  HgBA1c:No results found for: LABA1C  Lab Results   Component Value Date    TROPONINI <0.01 04/30/2022     Cardiac Data     EKG: Personally interpreted. 4/30/2022. Sinus rhythm with frequent and consecutive PVCs. Left axis deviation. Poor R wave progression.   Voltage criteria for LVH. Abdominal CT: 5/2017 Infrarenal abdominal aortic aneurysm measures up to 6.1 cm. Echocardiogram: Personally interpreted. 5/2/2022. Overall left ventricular systolic function appears moderate to severely reduced. Ejection fraction is visually estimated to be 30-35% with diffuse hypokinesis. Normal left ventricular wall thickness. Left ventricular cavity size is mildly dilated. Diastolic filling parameters suggest grade II diastolic dysfunction. The right ventricle appears mildly dilated with normal systolic function. The left atrium is severely dilated. Moderate tricuspid regurgitation. Mild mitral regurgitation. Trivial aortic regurgitation. Estimated pulmonary artery systolic pressure is moderately elevated at 53 mmHg assuming a right atrial pressure of 3 mmHg. Telemetry: Personally interpreted. Sinus with PVCs. Assessment and Plan   1) Acute on chronic systolic heart failure. EF 35%. NYHA III. Appears hypervolemic. Continue IV Lasix. Continue Toprol-XL, Aldactone, and lisinopril. Discontinued home medications of hydrochlorothiazide and nifedipine. Discussion of SGLT2i can be deferred to the outpatient setting. Patient is not an ICD candidate with advanced age. Weight 186.    2) CAD s/p CABG. continue medical management risk factor modification including use of aspirin, statin, and beta-blocker. 3) AAA without rupture. Measured 6.1 cm five years ago but has refused intervention. Continue statin and attempts at strict blood pressure control. 4) Type 2 diabetes mellitus. Diabetes management per primary team.    5) Essential hypertension. Controlled. Goal BP <130/80. Continue medical therapy. Code status: The patient is alert and answers questions appropriately. He has capacity for medical decision-making. He endorses his wishes for DNR/DNI-CCA. Patient's daughter was present bedside for conversation and is in agreement.       Overall, the problems requiring hospitalization are high in severity. Thank you for allowing us to participate in the care of [address] [place] [address]. Barbara Khan, [address] [place] [address] Road  5/3/2022 8:40 AM

## 2022-05-03 NOTE — PLAN OF CARE
Problem: Discharge Planning  Goal: Discharge to home or other facility with appropriate resources  Outcome: Progressing   Alert and oriented with some occasional forgetfulness but easily redirected. Resp. Easy and even Sats. WNL on RA LCTA No c/o pain Up to chair and returned to bed with use of cane and SBA Cont.  Per urinal Frequently turns and repositions self Free from fall/injury

## 2022-05-03 NOTE — PROGRESS NOTES
Occupational Therapy  Facility/Department: [address] PROGRESSIVE CARE  Occupational Therapy Initial Assessment and Tentative D/C      Name: Rick Gallegos  : 1933  MRN: [id number]  Date of Service: 5/3/2022    Discharge Recommendations: Rick Gallegos scored a 21/24 on the AM-PAC ADL Inpatient form. Current research shows that an AM-PAC score of 18 or greater is typically associated with a discharge to the patient's home setting. Based on the patient's AM-PAC score, and their current ADL deficits, it is recommended that the patient have 2-3 sessions per week of Occupational Therapy at d/c to increase the patient's independence. At this time, this patient demonstrates the endurance and safety to discharge home with [facility] and a follow up treatment frequency of 2-3x/wk. Please see assessment section for further patient specific details. If patient discharges prior to next session this note will serve as a discharge summary. Please see below for the latest assessment towards goals. Continue to assess pending progress,2-3 sessions per week,Home with assist PRN  OT Equipment Recommendations  Equipment Needed: No       Patient Diagnosis(es): The primary encounter diagnosis was Acute congestive heart failure, unspecified heart failure type ([address] A diagnosis of Cellulitis of perineum was also pertinent to this visit. Past Medical History:  has a past medical history of Diabetes mellitus ([address] Hyperlipidemia, Hypertension, and MI (myocardial infarction) ([address] Past Surgical History:  has a past surgical history that includes Cardiac surgery and hernia repair. Assessment   Performance deficits / Impairments: Decreased functional mobility ; Decreased balance;Decreased ADL status; Decreased endurance;Decreased high-level IADLs;Decreased strength  Assessment:  This is a pleasant 66-year-old  gentleman with history of CHF hypertension type 2 diabetes and is status post CABG about 20 years ago. He has had increased lower extremity edema extending to the scrotum and abdomen over the past few days. No chest pain but is short of breath although the latter seems to be somewhat chronic in nature. He had a mechanical fall this morning but denies injury however his son had to help him up. He typically gets around with a cane at home. Denies any actual pain but has some abrasions on the right arm and bilateral lower extremities. Denies cough chest pain or abdominal pain. PTA pt from home alone where pt was Ind with mobility and ADLs with cane. Pt currently requires SBA/CGA and use of cane for mobility and transfers. Pt with no overt LOB. Anticipate pt needing up to SBA/Min A for ADLs. Pt will benefit from skilled OT services at this time. Anticipate pt safe to return home with PRN assist and HHOT. Prognosis: Good  Decision Making: Low Complexity  Exam: see above  Assistance / Modification: cane  REQUIRES OT FOLLOW-UP: Yes  Activity Tolerance  Activity Tolerance: Patient Tolerated treatment well        Plan   Plan  Times per Week: 3-5x  Current Treatment Recommendations: Strengthening,Balance training,Functional mobility training,Endurance training,Safety education & training,Patient/Caregiver education & training,Self-Care / ADL     Restrictions  Restrictions/Precautions  Restrictions/Precautions: Fall Risk    Subjective   General  Chart Reviewed: Yes  Patient assessed for rehabilitation services?: Yes  Additional Pertinent Hx: per ED note, This is a pleasant 75-year-old  gentleman with history of CHF hypertension type 2 diabetes and is status post CABG about 20 years ago. He has had increased lower extremity edema extending to the scrotum and abdomen over the past few days. No chest pain but is short of breath although the latter seems to be somewhat chronic in nature. He had a mechanical fall this morning but denies injury however his son had to help him up.   He typically gets around with a cane at home. Denies any actual pain but has some abrasions on the right arm and bilateral lower extremities. Denies cough chest pain or abdominal pain. Family / Caregiver Present: No  Referring Practitioner: Fay Rivers MD  Subjective  Subjective: P agreeable to OT evaluation. Pt with no reports of pain. General Comment  Comments: okay for therapy per RN. Social/Functional History  Social/Functional History  Lives With: Alone  Type of Home: House  Home Layout: Multi-level,Bed/Bath upstairs,1/2 bath on main level,Laundry in basement  Home Access: Stairs to enter with rails,Level entry (Level entry garage to basement. 8-10 steps with handrail to main level. Stairlift from main level to 2nd floor.)  Bathroom Shower/Tub: Tub/Shower unit (pt reports sink bathing due to tub not working)  Bathroom Toilet: Standard  Bathroom Accessibility: Accessible  Home Equipment: [address] Help From: Family (Family occass assist with homemaking needs.)  ADL Assistance: Independent  Homemaking Assistance: Independent (Family occass assists although pt does take care of most of the time.)  Ambulation Assistance: Independent (With [employer]  Transfer Assistance: Independent  Active : Yes  Occupation: Retired  Type of Occupation: Worked at [employer]. Objective   Pulse: 76  Heart Rate Source: Telemetry  BP: 135/74  BP Location: Right upper arm  Patient Position: Left side  MAP (Calculated): 94.33  Resp: 27  SpO2: 98 %  O2 Device: None (Room air)  Hearing: Exceptions to [facility]  Hearing Exceptions: Hard of hearing/hearing concerns          Safety Devices  Type of Devices: Call light within reach; Chair alarm in place; Left in chair;Nurse notified  Balance  Sitting: Intact  Standing: Impaired (cane)  Standing - Static: Good  Standing - Dynamic: Fair  Gait  Overall Level of Assistance: Contact-guard assistance;Stand-by assistance (SBA/CGA; pt steadying self with R UE on object; anticipate \"furniture\" walking at baseline)  Distance (ft): 50 Feet (no overt LOB; no reports of light headedness/dizziness)  Assistive Device: Cane, straight  Wheelchair Bed Transfers  Wheelchair/Bed - Technique: Ambulating (cane)  Equipment Used: Other (chair)  Level of Asssistance: Stand by assistance  AROM: Within functional limits  PROM: Within functional limits  Strength:  Within functional limits  Coordination: Within functional limits  Tone: Normal  ADL  Feeding: Independent  Toileting: Stand by assistance (in stance to urinate in urinal)  Additional Comments: Anticipate pt needing up to SBA/Min A for ADLs based on ROM, strength, and balance        Bed mobility  Supine to Sit: Unable to assess  Sit to Supine: Unable to assess  Scooting: Unable to assess  Transfers  Sit to stand: Stand by assistance  Stand to sit: Stand by assistance  Transfer Comments: to/from cane     Cognition  Overall Cognitive Status: [facility]                  Education Given To: Patient  Education Provided: Role of Therapy;Plan of Care;Transfer Training;ADL Adaptive Strategies  Education Method: Demonstration;Verbal  Barriers to Learning: Hearing  Education Outcome: Verbalized understanding;Demonstrated understanding  LUE AROM (degrees)  LUE AROM : WFL  RUE AROM (degrees)  RUE AROM : [facility]          AM-PAC Score        AM-[facility] Inpatient Daily Activity Raw Score: 21 (05/03/22 0924)  AM-PAC Inpatient ADL T-Scale Score : 44.27 (05/03/22 0924)  ADL Inpatient CMS 0-100% Score: 32.79 (05/03/22 0924)  ADL Inpatient CMS G-Code Modifier : CJ (05/03/22 1984)    Goals  Short Term Goals  Time Frame for Short term goals: prior to D/C  Short Term Goal 1: complete functional mobility and transfers with supervision  Short Term Goal 2: complete bathing and dressing with supervision  Short Term Goal 3: complete toileting with supervision  Short Term Goal 4: complete grooming in stance at sink with supervision  Long Term Goals  Time Frame for Long term goals : STG=LTG  Patient Goals Patient goals : return home       Therapy Time   Individual Concurrent Group Co-treatment   Time In 6922         Time Out 0920         Minutes 25         Timed Code Treatment Minutes: 10 Minutes (15 minute eval)       ALTON Bernstein/GEOVANNI

## 2022-05-03 NOTE — PROGRESS NOTES
Hospitalist Progress Note      PCP: Barbara Dutton MD    Date of Admission: 4/30/2022    Chief Complaint: Difficulty urinating and leg swelling    Hospital Course: 66-year-old male admitted to the hospital with CHF. Initial symptoms were difficulty urinating and leg swelling  Cardiology consulted    Receiving IV Lasix    Echo shows systolic and diastolic dysfunction    History of known abdominal aortic aneurysm greater than 6 cm-medically managed      Subjective: Good spirits today he is jovial.  Wants to go home soon      Medications:  Reviewed    Infusion Medications    sodium chloride      dextrose       Scheduled Medications    furosemide  60 mg IntraVENous BID    spironolactone  12.5 mg Oral Daily    ascorbic acid  500 mg Oral Daily    aspirin  81 mg Oral Daily    atorvastatin  10 mg Oral Daily    tamsulosin  0.8 mg Oral Daily    sodium chloride flush  5-40 mL IntraVENous 2 times per day    enoxaparin  40 mg SubCUTAneous Daily    metoprolol succinate  25 mg Oral Daily    miconazole   Topical BID    insulin lispro  0-6 Units SubCUTAneous TID WC    insulin lispro  0-3 Units SubCUTAneous Nightly    lisinopril  10 mg Oral Daily     PRN Meds: calcium carbonate, sodium chloride flush, sodium chloride, ondansetron **OR** ondansetron, polyethylene glycol, acetaminophen **OR** acetaminophen, glucose, dextrose, glucagon (rDNA), dextrose, perflutren lipid microspheres      Intake/Output Summary (Last 24 hours) at 5/3/2022 1615  Last data filed at 5/3/2022 1527  Gross per 24 hour   Intake 840 ml   Output 2325 ml   Net -1485 ml       Physical Exam Performed:    /69   Pulse 83   Temp 98 °F (36.7 °C) (Oral)   Resp 22   Ht 6' 2\" (1.88 m)   Wt 186 lb 1.1 oz (84.4 kg)   SpO2 96%   BMI 23.89 kg/m²     General appearance: Frail appearing male sitting in stretcher no acute distress  HEENT: Pupils equal, round, and reactive to light. Conjunctivae/corneas clear.   Neck: Supple, with full range of motion. No jugular venous distention. Trachea midline. Respiratory:  Normal respiratory effort. Clear to auscultation, bilaterally without Rales/Wheezes/Rhonchi. Cardiovascular: Regular rate and rhythm with normal S1/S2 without murmurs, rubs or gallops. Abdomen: Soft, non-tender, non-distended with normal bowel sounds. Musculoskeletal: No clubbing, cyanosis or edema bilaterally. Full range of motion without deformity. Skin: Skin color, texture, turgor normal.  No rashes or lesions. Neurologic:  Neurovascularly intact without any focal sensory/motor deficits. Cranial nerves: II-XII intact, grossly non-focal.  Psychiatric: Alert and oriented, thought content appropriate, normal insight  Capillary Refill: Brisk,3 seconds, normal   Peripheral Pulses: +2 palpable, equal bilaterally   Extremities: Slightly decreased erythema from 5/1/2022 exam less swollen    Labs:   No results for input(s): WBC, HGB, HCT, PLT in the last 72 hours. Recent Labs     05/01/22  0556 05/02/22  0545 05/03/22  0551    141 141   K 3.7 3.4* 3.9    104 102   CO2 26 27 30   BUN 14 19 20   CREATININE 1.1 1.0 1.1   CALCIUM 8.6 8.1* 8.8     No results for input(s): AST, ALT, BILIDIR, BILITOT, ALKPHOS in the last 72 hours. No results for input(s): INR in the last 72 hours. No results for input(s): Padma Charlette in the last 72 hours. Urinalysis:      Lab Results   Component Value Date    NITRU Negative 04/30/2022    WBCUA 12 04/30/2022    BACTERIA None Seen 04/30/2022    RBCUA 3 04/30/2022    BLOODU Trace 04/30/2022    SPECGRAV 1.013 04/30/2022    GLUCOSEU Negative 04/30/2022       Radiology:  XR CHEST (2 VW)   Final Result   Pulmonary edema and small bilateral pleural effusions. Echo    Overall left ventricular systolic function appears moderate to severely    reduced. Ejection fraction is visually estimated to be 30-35% with diffuse    hypokinesis. Normal left ventricular wall thickness.  Left ventricular cavity Glenetta Latus is mildly dilated. Diastolic filling parameters suggest grade II    diastolic dysfunction.    The right ventricle appears mildly dilated with normal systolic function.    The left atrium is severely dilated.    Moderate tricuspid regurgitation.    Mild mitral regurgitation.    Trivial aortic regurgitation.    Estimated pulmonary artery systolic pressure is moderately elevated at 53    mmHg assuming a right atrial pressure of 3 mmHg.           Assessment/Plan:    Active Hospital Problems    Diagnosis     Acute on chronic systolic heart failure (HCC) [I50.23]      Priority: Medium    AAA (abdominal aortic aneurysm) without rupture ([facility]) [I71.4]      Priority: Medium    Coronary artery disease involving native coronary artery of native heart without angina pectoris [I25.10]      Priority: Medium    Type 2 diabetes mellitus with hyperglycemia, without long-term current use of insulin ([facility]) [E11.65]      Priority: Medium    Essential hypertension [I10]      Priority: Medium     Acute systolic and diastolic heart failure  EF low with lv dysfunction and diastolic dysfunction  Continue b-blocker. On IV Lasix. Weight is down another 2 pound  On ace  Aldactone added by Dr Curt Joyce      Diabetes mellitus type 2-controlled with insulin  Metformin on hold for now    Abdominal aortic aneurysm  Medical management with hypertension control    Coronary artery disease with history of CABG  Continue aspirin and statin and beta-blocker. No anginal symptoms    DVT Prophylaxis: lovenox  Diet: ADULT DIET; Regular; 4 carb choices (60 gm/meal); Low Sodium (2 gm); 1500 ml  Code Status: DNR-CCA      Dispo -Home in the next 24 to 48 hours.     Triston Rollins MD

## 2022-05-03 NOTE — CONSULTS
[facility]  HEART FAILURE PROGRAM      NAME:  Abram Gonzales. MEDICAL RECORD NUMBER:  [id number]  AGE: 80 y.o.    GENDER: male  : 1933  TODAY'S DATE:  5/3/2022    Subjective:     VISIT TYPE: evaluation     ADMITTING PHYSICIAN:  Fanny Turcios MD    PAST MEDICAL HISTORY        Diagnosis Date    Diabetes mellitus ([facility]     Hyperlipidemia     Hypertension     MI (myocardial infarction) ([facility]        SOCIAL HISTORY    Social History     Tobacco Use    Smoking status: Former Smoker     Types: Cigarettes     Quit date: 1999     Years since quittin.0    Smokeless tobacco: Not on file   Substance Use Topics    Alcohol use: No    Drug use: No       ALLERGIES    Allergies   Allergen Reactions    Penicillins Hives       MEDICATIONS  Scheduled Meds:   furosemide  60 mg IntraVENous BID    spironolactone  12.5 mg Oral Daily    ascorbic acid  500 mg Oral Daily    aspirin  81 mg Oral Daily    atorvastatin  10 mg Oral Daily    tamsulosin  0.8 mg Oral Daily    sodium chloride flush  5-40 mL IntraVENous 2 times per day    enoxaparin  40 mg SubCUTAneous Daily    metoprolol succinate  25 mg Oral Daily    miconazole   Topical BID    insulin lispro  0-6 Units SubCUTAneous TID WC    insulin lispro  0-3 Units SubCUTAneous Nightly    lisinopril  10 mg Oral Daily       ADMIT DATE: 2022      Objective:     ADMISSION DIAGNOSIS:   Cellulitis of perineum [L03.315]  CHF (congestive heart failure), NYHA class I, acute on chronic, combined (HCC) [I50.43]  Acute congestive heart failure, unspecified heart failure type (HCC) [I50.9]     /69   Pulse 83   Temp 98 °F (36.7 °C) (Oral)   Resp 22   Ht 6' 2\" (1.88 m)   Wt 186 lb 1.1 oz (84.4 kg)   SpO2 96%   BMI 23.89 kg/m²     ADMIT:  Weight: 202 lb 14.4 oz (92 kg)    TODAY: Weight: 186 lb 1.1 oz (84.4 kg)    Wt Readings from Last 10 Encounters:   22 186 lb 1.1 oz (84.4 kg)   06/26/15 215 lb (97.5 kg)   05/06/15 215 lb (97.5 kg)          Intake/Output Summary (Last 24 hours) at 5/3/2022 1748  Last data filed at 5/3/2022 1527  Gross per 24 hour   Intake 840 ml   Output 2325 ml   Net -1485 ml       LABS  BMP:   Lab Results   Component Value Date     05/03/2022    K 3.9 05/03/2022    K 3.6 04/30/2022     05/03/2022    CO2 30 05/03/2022    BUN 20 05/03/2022    CREATININE 1.1 05/03/2022    CALCIUM 8.8 05/03/2022    GFRAA >60 05/03/2022    LABGLOM >60 05/03/2022    GLUCOSE 91 05/03/2022     CBC: No results for input(s): WBC, HGB, HCT, MCV, PLT in the last 72 hours. BNP: No results found for: BNP    ECHOCARDIOGRAM:   5/2/22  Summary   Overall left ventricular systolic function appears moderate to severely   reduced. Ejection fraction is visually estimated to be 30-35% with diffuse   hypokinesis. Normal left ventricular wall thickness. Left ventricular cavity   size is mildly dilated. Diastolic filling parameters suggest grade II   diastolic dysfunction. The right ventricle appears mildly dilated with normal systolic function. The left atrium is severely dilated. Moderate tricuspid regurgitation. Mild mitral regurgitation. Trivial aortic regurgitation. Estimated pulmonary artery systolic pressure is moderately elevated at 53   mmHg assuming a right atrial pressure of 3 mmHg. Assessment:     CONSULTS:   IP CONSULT TO HOSPITALIST  IP CONSULT TO HEART FAILURE NURSE/COORDINATOR  IP CONSULT TO DIETITIAN  IP CONSULT TO CARDIOLOGY  IP CONSULT TO PALLIATIVE CARE  IP CONSULT TO CARDIAC REHAB  IP CONSULT TO SOCIAL WORK    Patient has a CARDIOLOGY CONSULT: Yes- [facility] consulted. Pt follows with [facility]'s Dr Mary Pulido. Last OV 4/1/22. Patient taking an ACEI/ARB:  Yes- on lisinopril.    Alberto- yes  SGLT2I- per pt's primary cardiologist       Patient taking a BETA BLOCKER:  Yes- toprol    SCALE AVAILABLE:  Yes - dtr will purchase a new digital scale from [employer] as pt has an older version scale    EDUCATION STATUS: Patient and Other: and pt's dtr Jacob Corea who lives 10 mins from him and does his grocery shopping   [x]  Provided both written and verbal education on Heart Failure signs/symptoms. [x]  Provided instructions on daily medications. [x]  Provided instructions to monitor and record weight daily. [x]  Provided instructions to call if weight increases 3 lbs in one day or 5 lbs in one week. [x]  Received verbal acknowledgment/understanding of Heart Failure related causes. [x]  Provided instructions on how to maintain a low sodium diet. []  Provided recommendations for smoking cessation programs  [x]  Provided recommendations on activity and exercise      [x]  Other:    HF RN consult noted, chart reviewed. Pt came in with SOB, scrotal edema, leg swelling and difficulty urinating. He was found to have new onset acute HF. An echo was ordered and cardiology consulted. Echo revealed EF 30-35% with a Grade II diastolic dysfunction. Pt is being diuresed with IV lasix 40mg BID and is negative 4.5 liters thus far. Upon entering room, pt up in recliner on room air with compression stockings in place and his dtr Jacob Corea is visiting. I introduced myself and my role in his care and they were agreeable to spend time with me. I introduced education on 'What is HF', systolic and diastolic dysfunction, and risk factors. Pt follows with Dr Ellen Todd at [facility] and last [address] was 4/1/22 where he did share his c/o dyspnea. Card offered to order a stress test to which pt declined. I explained to pt CAD could be a risk factor and cause of his HF. They asked about what all is involved in a cardiac cath. I did basic education and encouraged them to speak with Dr Nayely Borrego at [facility] f/u. They verbalized understanding. Teaching done that HF is a chronic and progressive disease. Pt was seen by [facility] this admit. He is agreeable to an [address] done on importance of initiating daily wts.  Dtr states she will order her dad a new digital scale from [employer]. He has a scale but it's an older version. I went over the need to log this wts, when and how to perform them, safety aspect, the 3/5 rule, and who and when to call. I went over the HF Zones in detail. I wrote his Cardiologist's number on the HF Zone sheet, yellow zone. Both pt and dtr were engaged in education session and asked appropriate questions. Teaching done on need to follow a low Na diet. Pt stated \"I don't use a salt shaker but I realize there is salt in food. \" I went over the foods typically high in Na content. He shared he likes turtle canned soup. I went over other food options besides canned soups. After review of a typical day of fluids, both he and dtr agree he does not go over 2 liters of fluids a day. He did share an interest in getting [employer]. I have re-consulted SW so can give info on COA/MOW. Dietician is still to see pt. I went over his cardiac medications. He was on a ACE and BB prior to admit but teaching done on the change to evidence based BB and lisinopril without the HCTZ. Pt denies any issues affording his meds. Pt states he does drive. I introduced our Cardiac Rehab program and discussed the benefits of a exercise. A phase 1 consult was placed for information. I also briefly introduced [facility]. They stated they will discuss these with Dr Miguel Chapman     Both are aware I will be making him a 7 day hospital f/u appt which will be on his AVS. The denied any further questions. Praise given for participating in our education session, 35 mins spent. CURRENT DIET: ADULT DIET; Regular; 4 carb choices (60 gm/meal); Low Sodium (2 gm); 1500 ml    EDUCATIONAL PACKETS PROVIDED- PRINTED FROM [employer]. Titles and material given:  Yes   []  What is Heart Failure?   []  Heart Failure: Warning Signs of a Flare-Up  []  Heart Failure: Making Changes to Your Diet  []  Heart Failure: Medications to Help Your Heart   [x]  Other: [facility] Pt Education booklet given and reviewed in detail. I placed pt's cardiologist's phone number in book and on HF Zones    PATIENT/CAREGIVER TEACHING:    Level of patient/caregiver understanding able to:   [x] Verbalize understanding   [] Demonstrate understanding       [x] Teach back        [x] Needs reinforcement     []  Other:      TEACHING TIME:  30 minutes       Plan:       DISCHARGE PLAN:  Placement for patient upon discharge: home with support of dtrs (has x4 total, 2 local: Ailyn and Daniella)   Hospice Care:  no  Code Status: DNR-CCA  Discharge appointment scheduled: Pt and dtr informed I will be making him a 7 day hospital f/u appt and this will be on his AVS     RECOMMENDATIONS:   [x]  Encourage to call his Cardiologist Dr Sana Del Angel and /or Renay Lopez with any questions or concerns. [x]  Educate further Mr. Maggie Pham on fluid restriction 48 oz- 64 oz during inpatient stay so he can understand how to measure intake at home. [x]  Continue to educate on S/S of Heart Failure. [x]  Emphasize daily weights, diet, and if changes, to call Heart Failure Resource Line  [x]  Other:  Phase 1 cardiac rehab order placed. I also give CR contact info. Pt declines our [facility] at this time. \"I will talk to my heart doctor. \"           Electronically signed by Eric Pike RN, BSN CHFN  on 5/3/2022 at 5:48 PM

## 2022-05-04 LAB
ANION GAP SERPL CALCULATED.3IONS-SCNC: 12 MMOL/L (ref 3–16)
BUN BLDV-MCNC: 22 MG/DL (ref 7–20)
CALCIUM SERPL-MCNC: 8.8 MG/DL (ref 8.3–10.6)
CHLORIDE BLD-SCNC: 103 MMOL/L (ref 99–110)
CO2: 30 MMOL/L (ref 21–32)
CREAT SERPL-MCNC: 1.1 MG/DL (ref 0.8–1.3)
GFR AFRICAN AMERICAN: >60
GFR NON-AFRICAN AMERICAN: >60
GLUCOSE BLD-MCNC: 102 MG/DL (ref 70–99)
GLUCOSE BLD-MCNC: 107 MG/DL (ref 70–99)
GLUCOSE BLD-MCNC: 193 MG/DL (ref 70–99)
GLUCOSE BLD-MCNC: 87 MG/DL (ref 70–99)
GLUCOSE BLD-MCNC: 95 MG/DL (ref 70–99)
MAGNESIUM: 1.8 MG/DL (ref 1.8–2.4)
PERFORMED ON: ABNORMAL
PERFORMED ON: NORMAL
POTASSIUM SERPL-SCNC: 3.6 MMOL/L (ref 3.5–5.1)
SODIUM BLD-SCNC: 145 MMOL/L (ref 136–145)

## 2022-05-04 PROCEDURE — 6360000002 HC RX W HCPCS: Performed by: INTERNAL MEDICINE

## 2022-05-04 PROCEDURE — 2700000000 HC OXYGEN THERAPY PER DAY

## 2022-05-04 PROCEDURE — 97530 THERAPEUTIC ACTIVITIES: CPT

## 2022-05-04 PROCEDURE — 97535 SELF CARE MNGMENT TRAINING: CPT

## 2022-05-04 PROCEDURE — 36415 COLL VENOUS BLD VENIPUNCTURE: CPT

## 2022-05-04 PROCEDURE — 97110 THERAPEUTIC EXERCISES: CPT

## 2022-05-04 PROCEDURE — 99233 SBSQ HOSP IP/OBS HIGH 50: CPT | Performed by: INTERNAL MEDICINE

## 2022-05-04 PROCEDURE — 6370000000 HC RX 637 (ALT 250 FOR IP): Performed by: INTERNAL MEDICINE

## 2022-05-04 PROCEDURE — 1200000000 HC SEMI PRIVATE

## 2022-05-04 PROCEDURE — 94761 N-INVAS EAR/PLS OXIMETRY MLT: CPT

## 2022-05-04 PROCEDURE — 97116 GAIT TRAINING THERAPY: CPT

## 2022-05-04 PROCEDURE — 80048 BASIC METABOLIC PNL TOTAL CA: CPT

## 2022-05-04 PROCEDURE — 83735 ASSAY OF MAGNESIUM: CPT

## 2022-05-04 PROCEDURE — 2580000003 HC RX 258: Performed by: INTERNAL MEDICINE

## 2022-05-04 RX ORDER — FUROSEMIDE 10 MG/ML
40 INJECTION INTRAMUSCULAR; INTRAVENOUS 2 TIMES DAILY
Status: COMPLETED | OUTPATIENT
Start: 2022-05-04 | End: 2022-05-04

## 2022-05-04 RX ADMIN — OXYCODONE HYDROCHLORIDE AND ACETAMINOPHEN 500 MG: 500 TABLET ORAL at 11:31

## 2022-05-04 RX ADMIN — INSULIN LISPRO 1 UNITS: 100 INJECTION, SOLUTION INTRAVENOUS; SUBCUTANEOUS at 12:30

## 2022-05-04 RX ADMIN — ASPIRIN 81 MG: 81 TABLET, COATED ORAL at 11:31

## 2022-05-04 RX ADMIN — MICONAZOLE NITRATE: 2 POWDER TOPICAL at 22:12

## 2022-05-04 RX ADMIN — ENOXAPARIN SODIUM 40 MG: 100 INJECTION SUBCUTANEOUS at 11:31

## 2022-05-04 RX ADMIN — TAMSULOSIN HYDROCHLORIDE 0.8 MG: 0.4 CAPSULE ORAL at 11:31

## 2022-05-04 RX ADMIN — MICONAZOLE NITRATE: 2 POWDER TOPICAL at 11:32

## 2022-05-04 RX ADMIN — LISINOPRIL 10 MG: 10 TABLET ORAL at 11:31

## 2022-05-04 RX ADMIN — METOPROLOL SUCCINATE 25 MG: 25 TABLET, EXTENDED RELEASE ORAL at 11:31

## 2022-05-04 RX ADMIN — FUROSEMIDE 40 MG: 10 INJECTION, SOLUTION INTRAMUSCULAR; INTRAVENOUS at 17:45

## 2022-05-04 RX ADMIN — Medication 10 ML: at 11:31

## 2022-05-04 RX ADMIN — Medication 10 ML: at 22:12

## 2022-05-04 RX ADMIN — SPIRONOLACTONE 12.5 MG: 25 TABLET ORAL at 11:32

## 2022-05-04 RX ADMIN — FUROSEMIDE 60 MG: 10 INJECTION, SOLUTION INTRAMUSCULAR; INTRAVENOUS at 11:31

## 2022-05-04 RX ADMIN — ATORVASTATIN CALCIUM 10 MG: 10 TABLET, FILM COATED ORAL at 11:31

## 2022-05-04 ASSESSMENT — ENCOUNTER SYMPTOMS
GASTROINTESTINAL NEGATIVE: 1
RESPIRATORY NEGATIVE: 1
EYES NEGATIVE: 1

## 2022-05-04 ASSESSMENT — PAIN SCALES - GENERAL: PAINLEVEL_OUTOF10: 0

## 2022-05-04 NOTE — PROGRESS NOTES
Occupational Therapy  Facility/Department: [address] PROGRESSIVE CARE  Occupational Therapy Treatment and Tentative D/C      Name: Taty Charles  : 1933  MRN: [id number]  Date of Service: 2022    Discharge Recommendations: Taty Charles scored a 21/24 on the AM-PAC ADL Inpatient form. Current research shows that an AM-PAC score of 18 or greater is typically associated with a discharge to the patient's home setting. If patient discharges prior to next session this note will serve as a discharge summary. Please see below for the latest assessment towards goals. Continue to assess pending progress,Home with assist PRN  OT Equipment Recommendations  Equipment Needed: No       Patient Diagnosis(es): The primary encounter diagnosis was Acute congestive heart failure, unspecified heart failure type ([facility] [address] A diagnosis of Cellulitis of perineum was also pertinent to this visit. Past Medical History:  has a past medical history of Diabetes mellitus ([facility] [address] Hyperlipidemia, Hypertension, and MI (myocardial infarction) ([facility] [address] Past Surgical History:  has a past surgical history that includes Cardiac surgery and hernia repair. Assessment   Performance deficits / Impairments: Decreased functional mobility ; Decreased balance;Decreased ADL status; Decreased endurance;Decreased high-level IADLs;Decreased strength  Assessment: Pt tolerated session well. Pt completes functional mobility and transfers with SBA/supervision and use of cane. Pt with no LOB. Pt completes toileting and grooming at sink with supervision. Pt reports no concerns returning home at this time. Continue with POC.   Prognosis: Good  Assistance / Modification: cane  REQUIRES OT FOLLOW-UP: Yes  Activity Tolerance  Activity Tolerance: Patient Tolerated treatment well        Plan   Plan  Times per Week: 3-5x  Current Treatment Recommendations: Strengthening,Balance training,Functional mobility training,Endurance training,Safety education & training,Patient/Caregiver education & training,Self-Care / ADL     Restrictions  Restrictions/Precautions  Restrictions/Precautions: Fall Risk    Subjective   General  Chart Reviewed: Yes  Patient assessed for rehabilitation services?: Yes  Additional Pertinent Hx: per ED note, This is a pleasant 24-year-old  gentleman with history of CHF hypertension type 2 diabetes and is status post CABG about 20 years ago. He has had increased lower extremity edema extending to the scrotum and abdomen over the past few days. No chest pain but is short of breath although the latter seems to be somewhat chronic in nature. He had a mechanical fall this morning but denies injury however his son had to help him up. He typically gets around with a cane at home. Denies any actual pain but has some abrasions on the right arm and bilateral lower extremities. Denies cough chest pain or abdominal pain. Family / Caregiver Present: No  Referring Practitioner: Perla Tena MD  Subjective  Subjective: Pt agreeable to OT treatment. Pt with on reports of pain. General Comment  Comments: okay for therapy per RN. Social/Functional History  Social/Functional History  Lives With: Alone  Type of Home: House  Home Layout: Multi-level,Bed/Bath upstairs,1/2 bath on main level,Laundry in basement  Home Access: Stairs to enter with rails,Level entry (Level entry garage to basement. 8-10 steps with handrail to main level.   Stairlift from main level to 2nd floor.)  Bathroom Shower/Tub: Tub/Shower unit (pt reports sink bathing due to tub not working)  Bathroom Toilet: Standard  Bathroom Accessibility: Accessible  Home Equipment: Lift chair,Cane  Receives Help From: Family (Family occass assist with homemaking needs.)  ADL Assistance: Independent  Homemaking Assistance: Independent (Family occass assists although pt does take care of most of the time.)  Ambulation Assistance: Independent (With [employer]  Transfer Assistance: Independent  Active : Yes  Occupation: Retired  Type of Occupation: Worked at [employer]. Objective   Pulse: 78  Heart Rate Source: Monitor  BP: (!) 112/96  BP Location: Right upper arm  Patient Position: Sitting  MAP (Calculated): 101.33  Resp: 19  SpO2: 100 %  O2 Device: Nasal cannula  Hearing: Exceptions to [facility]  Hearing Exceptions: Hard of hearing/hearing concerns          Safety Devices  Type of Devices: Call light within reach; Chair alarm in place; Left in chair;Nurse notified  Balance  Sitting: Intact  Standing: Impaired  Standing - Static: Good  Standing - Dynamic: Fair  Gait  Overall Level of Assistance: Stand-by assistance;Assist X1 (no overt LOB)  Distance (ft): 25 Feet (25ft x2)  Assistive Device: Cane, straight  Toilet Transfers  Toilet - Technique: Ambulating  Equipment Used: Grab bars  Toilet Transfer: Supervision;Stand by assistance  Wheelchair Bed Transfers  Wheelchair/Bed - Technique: Ambulating (cane)  Equipment Used: Other (chair)  Level of Asssistance: Supervision;Stand by assistance  AROM: Within functional limits  PROM: Within functional limits  Strength:  Within functional limits  Coordination: Within functional limits  Tone: Normal  ADL  Grooming: Supervision (in stance at sink to wash hands)  Toileting: Supervision (supervision for balance; pt able to complete pericare; no pants management)        Bed mobility  Supine to Sit: Unable to assess  Sit to Supine: Unable to assess  Scooting: Unable to assess  Transfers  Sit to stand: Stand by assistance;Supervision  Stand to sit: Stand by assistance;Supervision  Transfer Comments: to/from cane     Cognition  Overall Cognitive Status: [facility]                  Education Given To: Patient  Education Provided: Role of Therapy;Plan of Care;Transfer Training;ADL Adaptive Strategies  Education Method: Demonstration;Verbal  Barriers to Learning: Hearing  Education Outcome: Verbalized understanding;Demonstrated understanding          AM-PAC Score AM-PAC Inpatient Daily Activity Raw Score: 21 (05/04/22 1116)  AM-PAC Inpatient ADL T-Scale Score : 44.27 (05/04/22 1116)  ADL Inpatient CMS 0-100% Score: 32.79 (05/04/22 1116)  ADL Inpatient CMS G-Code Modifier : CJ (05/04/22 1116)    Goals  Short Term Goals  Time Frame for Short term goals: prior to D/C; ongoing 5/4  Short Term Goal 1: complete functional mobility and transfers with supervision  Short Term Goal 2: complete bathing and dressing with supervision  Short Term Goal 3: complete toileting with supervision- goal met 5/4  Short Term Goal 4: complete grooming in stance at sink with supervision- goal met 5/4  Long Term Goals  Time Frame for Long term goals : STG=LTG  Patient Goals   Patient goals : return home       Therapy Time   Individual Concurrent Group Co-treatment   Time In 1050         Time Out 1115         Minutes 25         Timed Code Treatment Minutes: 25 Minutes       ALTON Galloway/L

## 2022-05-04 NOTE — PLAN OF CARE
Problem: Discharge Planning  Goal: Discharge to home or other facility with appropriate resources  Outcome: Progressing     Problem: Safety - Adult  Goal: Free from fall injury  5/4/2022 0336 by Cindy Wilson RN  Outcome: Progressing     Problem: Cardiovascular - Adult  Goal: Maintains optimal cardiac output and hemodynamic stability  5/4/2022 1632 by Snow Cooper RN  Outcome: Progressing     Problem: Metabolic/Fluid and Electrolytes - Adult  Goal: Hemodynamic stability and optimal renal function maintained  Outcome: Progressing

## 2022-05-04 NOTE — PROGRESS NOTES
Nutrition Note    Heart Failure Diet Education:    Per hospital protocol or consult, patient being seen for Heart Failure diet guidelines. Patient's Lifestyle Questions:   Is HF a new Diagnosis? [x]Yes []No    Has patient had prior education? []Yes [x]No    Who does Grocery shopping and cooking? Daughter does grocery shopping, he does cooking. Frequency of eating out? Occasionally eats out at [employer]. Typical Eating Habits: Bowl of cereal in the morning, skips lunch, and dinner varies. Instructed the Patient on:   [x] High/Low Sodium foods    [x] Moderation/portion control  [x] Eating out  [] Fluid Restriction    [] Label reading  [x] Carb Counting   [] Other:      Educational Materials Provided:   [x] Nutrition Care Manual (NCM) Heart Failure Nutrition Therapy   [x] NCM Sodium (Salt) Content of Foods  [] NCM Sodium Free Flavoring Tips    [] Heart Healthy Eating Label Reading Tips   [] Healthy Eating when Eating Out  [] Controlling Your Fluids   [x] Fast Food Guide (from [employer])  [] NCM Carbohydrate Counting for People with Diabetes   [] Managing Your Diabetes Plate Method    [x] Lower sodium Frozen meals     -Diet Recommendations: 2000 mg Sodium/day, 64 oz Fluids/day         Monitoring/Evaluation:   -Barriers: none  -Evaluation of education: Indicates understanding.  -Expected compliance: fair. Additional Comments:   Pt reports not using any salt or seasonings on food. Reports daughter does his grocery shopping. Pt was inquiring about MOW and SS consulted to discuss. Educated on portion control and moderation. Pt reports he is DM and states he enjoyed eating ice cream. Briefly discussed high carb foods to monitor and manage. Pt verbalized understanding. He recalled HF nurse just saw pt yesterday and discussed some dietary restrictions.     Total time involved in patient education: 15 minutes        Electronically signed by Vincent Ureña RD, LD on 5/4/2022 at 11:32 AM Electronically signed by Adelina Colon RD, HIEU on 5/4/22 at 11:30 AM EDT    Contact: [phone]

## 2022-05-04 NOTE — PROGRESS NOTES
[facility] PROGRESS NOTE     Patient name:Braeden Mandujano . BYN:[id number] :1933  Room/Bed:Y2X-0176/5276-01    LOS: 4 days        ASSESSMENT/RECOMMENDATIONS     80 y.o. male with debility and swelling.          Symptom Management:  1. Debility: Patient was again pale and appeared to be fatigued during my visit today. Patient exhibited some bilateral extremity weakness today. Patient continues to require some ADL assistance. 2. Swelling: Patient now on Lasix 60 mg BID for relief. 3. Goals of Care: Again met the patient at his bedside. Patient was again oriented x 4 today. Patient was in a good mood today. Patient again appeared to be decisional today. Again reviewed patient's current health condition, goals and code status. Patient again indicated he would like to continue with aggressive therapy at this time and he is looking forward to being discharged from the hospital.  Code status is to remain DNR-CCA.         Patient/Family Goals of Care :     - Again met the patient at his bedside. Patient was again oriented x 4 today. Patient was in a good mood today. Patient again appeared to be decisional today. Again reviewed patient's current health condition, goals and code status. Patient again indicated he would like to continue with aggressive therapy at this time and he is looking forward to being discharged from the hospital.  Code status is to remain DNR-CCA.         - Met patient and his daughter Kisha Rios at the bedside today. Patient was lethargic but appeared to be oriented x 4 today. Patient appeared to be decisional today. Reviewed patient's current health status, goals of care and code status. Patient indicated he would like to pursue all aggressive treatment at this time. Patient also indicated that once he is discharged home that he would like to meet with [facility] for assistance with in home care.   Patient's daughter Kisha Rios requested [facility] set up appointment with herself or her sister Berna Churchill for the patient. Code status was reviewed and the patient wishes to remain a DNR-CCA (set by Dr. Elder Onofre on 4/30/2022).    Disposition/Discharge Plan:   An outpatient [facility] referral was ordered for post-discharge to followup with the patient once they return home.     Advance Directives:  · Surrogate Decision Maker: Per patient, both Berna Churchill (patient's daughter) and Kezia Ingrid (patient's daughter) are primary medical contacts for the patient for decision making. Patient was decisional today. · Code status:  DNR-CCA     Case discussed with: patient,  Thank you for allowing us to participate in the care of this patient. SUBJECTIVE     Chief Complaint: Debility    Last 24 hours:   Patient oriented x 4 today. Patient did appear to be decisional today. Patient again indicated he would like to continue with aggressive therapy at this time and he is looking forward to being discharged from the hospital.  Code status is to remain DNR-CCA. ROS:    Review of Systems   Constitutional: Positive for fatigue. HENT: Negative. Eyes: Negative. Respiratory: Negative. Cardiovascular: Positive for leg swelling. Gastrointestinal: Negative. Musculoskeletal: Negative. Skin: Positive for pallor. Neurological: Positive for weakness. Psychiatric/Behavioral: Negative. Negative for agitation, behavioral problems and confusion. All other systems reviewed and are negative. Patient unable to complete full ROS due to current cognitive status. Information that is obtained from nursing and chart. OBJECTIVE   /71   Pulse 82   Temp 98.9 °F (37.2 °C) (Oral)   Resp 20   Ht 6' 2\" (1.88 m)   Wt 184 lb 11.9 oz (83.8 kg)   SpO2 100%   BMI 23.72 kg/m²   I/O last 3 completed shifts: In: 960 [P.O.:960]  Out: 3850 [KLBEW:7273]  I/O this shift:  In: 360 [P.O.:360]  Out: 300 [Urine:300]      Physical Exam  Vitals reviewed.    Constitutional: Appearance: He is ill-appearing. HENT:      Head: Normocephalic. Nose: Nose normal.   Eyes:      Pupils: Pupils are equal, round, and reactive to light. Cardiovascular:      Rate and Rhythm: Normal rate. Pulses: Normal pulses. Pulmonary:      Effort: Pulmonary effort is normal. No respiratory distress. Breath sounds: Normal breath sounds. Abdominal:      General: Bowel sounds are normal.      Palpations: Abdomen is soft. Musculoskeletal:      Cervical back: Neck supple. Right lower leg: Edema (2+) present. Left lower leg: Edema (2+) present. Skin:     General: Skin is warm and dry. Coloration: Skin is pale. Neurological:      Comments: Patient appeared to be oriented x 4 and decisional today   Psychiatric:         Mood and Affect: Mood normal.         Thought Content:  Thought content normal.         Judgment: Judgment normal.          Total time: 37 minutes  >50% of time spent counseling patient at bedside or POA/family member if applicable , reviewing information and discussing care, coordinating with care team       Signed By: Electronically signed by LOWELL Lozano CNP on 5/4/2022 at [address] [place] [address]    May 4, 2022

## 2022-05-04 NOTE — PROGRESS NOTES
Physical Therapy  Facility/Department: [address] PROGRESSIVE CARE  Physical Therapy Treatment Note    Name: Kartik Seo  : 1933  MRN: [id number]  Date of Service: 2022    Discharge Recommendations:  Continue to assess pending progress,Home with Home health PT   PT Equipment Recommendations  Other: Will monitor for potential equipt needs. Would recommend an Alert System. Joseph Dent Sr. scored a 19/ on the AM-PAC short mobility form. Current research shows that an AM-PAC score of 18 or greater is typically associated with a discharge to the patient's home setting. Based on the patient's AM-PAC score and their current functional mobility deficits, it is recommended that the patient have 2-3 sessions per week of Physical Therapy at d/c to increase the patient's independence. At this time, this patient demonstrates the endurance and safety to discharge home with PT Evaluation  and a follow up treatment frequency of 2-3x/wk. Please see assessment section for further patient specific details. If patient discharges prior to next session this note will serve as a discharge summary. Please see below for the latest assessment towards goals. Assessment   Body Structures, Functions, Activity Limitations Requiring Skilled Therapeutic Intervention: Decreased functional mobility ; Decreased endurance;Decreased balance  Assessment: 79 y/o male admit 2022 with CHF, Cellulitis of Perineum, and Scrotal/LE Edema. PMH as noted including AAA, MI, CAD, CABG, Hernia Repair. PTA pt living alone in multi level home; steps to access garage to main level and then stair lift to 2nd floor bed/bath; independent daily care and functional mobility (with cane). At this time, pt reports plan to return home; would recommend Home PT. Will monitor pt's progress. Therapy Prognosis: Good  History: 79 y/o male admit 2022 with CHF, Cellulitis of Perineum, and Scrotal/LE Edema.   PMH as noted including AAA, MI, CAD, CABG, Hernia Repair. Exam: See above. Clinical Presentation: See above. Barriers to Learning: [place]. Requires PT Follow-Up: Yes  Activity Tolerance  Activity Tolerance: Patient tolerated treatment well     Plan   Plan  Plan: 3-5 times per week  Current Treatment Recommendations: Strengthening,Functional mobility training,Transfer training,Gait training,Stair training,Safety education & training,Patient/Caregiver education & training  Safety Devices  Type of Devices: Call light within reach,Chair alarm in place,Left in chair,Nurse notified     Restrictions  Restrictions/Precautions  Restrictions/Precautions: Fall Risk     Subjective   General  Chart Reviewed: Yes  Patient assessed for rehabilitation services?: Yes  Additional Pertinent Hx: 81 y/o male admit 4/30/2022 with CHF, Cellulitis of Perineum, and Scrotal/LE Edema. PMH as noted including AAA, MI, CAD, CABG, Hernia Repair. Family / Caregiver Present: No  Referring Practitioner: Dr. Angela Khanna: Within Functional Limits  Subjective  Subjective: Pt agreeable to PT Rx. Social/Functional History  Social/Functional History  Lives With: Alone  Type of Home: House  Home Layout: Multi-level,Bed/Bath upstairs,1/2 bath on main level,Laundry in basement  Home Access: Stairs to enter with rails,Level entry (Level entry garage to basement. 8-10 steps with handrail to main level.   Stairlift from main level to 2nd floor.)  Bathroom Shower/Tub: Tub/Shower unit (pt reports sink bathing due to tub not working)  Bathroom Toilet: Standard  Bathroom Accessibility: Accessible  Home Equipment: Lift chair,Cane  Receives Help From: Family (Family occass assist with homemaking needs.)  ADL Assistance: Independent  Homemaking Assistance: Independent (Family occass assists although pt does take care of most of the time.)  Ambulation Assistance: Independent (With [employer].)  Transfer Assistance: Independent  Active : Yes  Occupation: Retired  Type of Occupation: Worked at [employer]. Vision/Hearing  Hearing: Exceptions to [facility]  Hearing Exceptions: Hard of hearing/hearing concerns    Cognition   Orientation  Overall Orientation Status: Within Functional Limits  Cognition  Overall Cognitive Status: WFL     Objective      Observation/Palpation  Observation: Forward/Flexed C-Spine, Kyphotic T-Spine. Balance  Sitting: Intact  Standing: Impaired  Standing - Static: Good  Standing - Dynamic: Fair  Gait  Overall Level of Assistance: Stand-by assistance;Assist X1 (no overt LOB)  Distance (ft): 25 Feet (25ft x2)  Assistive Device: Cane, straight  Bed mobility  Supine to Sit: Supervision  Transfers  Sit to Stand: Stand by assistance (With [employer]  Stand to sit: Stand by assistance (With [employer]  Ambulation  Surface: level tile  Device: Single point cane  Distance: Pt amb bed to chair 4-5 steps, additional 25' x 2 with Cane SBA. Flexed/Kyphotic Posture; foot flat/diminished clearance although no LE buckling/giving way. Alittle gait path veer at times although no specific LOB. Balance  Comments: Pt stood at toilet few min to void without LOB. A/AROM Exercises: Hip Flex, Knee Ext, Toe/Heel Raises 10x2            AM-PAC Score  AM-PAC Inpatient Mobility Raw Score : 19 (05/04/22 1222)  AM-PAC Inpatient T-Scale Score : 45.44 (05/04/22 1222)  Mobility Inpatient CMS 0-100% Score: 41.77 (05/04/22 1222)  Mobility Inpatient CMS G-Code Modifier : CK (05/04/22 1222)          Goals  Short Term Goals  Time Frame for Short term goals: Upon d/c acute care setting. Short term goal 1: Bed Mob Independent. Short term goal 2: Transfers with assist device Supervision. Short term goal 3: Amb with assist device 50-75' SBA/Supervision. Short term goal 4: Stair Negotiation as approp. Patient Goals   Patient goals : Return home.        Therapy Time   Individual Concurrent Group Co-treatment   Time In 0930         Time Out 1010         Minutes 40                 Esvin Late L Erich Art

## 2022-05-04 NOTE — CARE COORDINATION
Pt with questionable area to L buttock. Possible pressure vs MASD. Pt buttock red. Sacral border placed. Most likely MASD. Will continue to monitor. Specialty bed ordered.   Electronically signed by Jay Cantrell RN CWOCN on 5/4/2022 at 12:21 PM

## 2022-05-04 NOTE — PROGRESS NOTES
[address]   Daily Progress Note      Admit Date:  4/30/2022      Subjective:   Mr. Lonny Solorzano is an 79yo male with a past medical history significant for CAD s/p CABG and chronc systolic CHF who presented to [facility] with shortness of breath and edema. He was found to be in CHF and has been diuresing with IV lasix. Interval History:  Santa Aldana reports feeling well. He denies any chest pain and thinks his breathing is better. He states that he slept flat in bed. No events overnight. Sinus rhythm on telemetry      Objective:     /71   Pulse 82   Temp 98.9 °F (37.2 °C) (Oral)   Resp 20   Ht 6' 2\" (1.88 m)   Wt 184 lb 11.9 oz (83.8 kg)   SpO2 100%   BMI 23.72 kg/m²      Intake/Output Summary (Last 24 hours) at 5/4/2022 1220  Last data filed at 5/4/2022 0900  Gross per 24 hour   Intake 840 ml   Output 2275 ml   Net -1435 ml       Physical Exam:  General:  Awake, alert, NAD  Skin:  Warm and dry  Neck:  JVD<8, no carotid bruits  Chest:  Clear to auscultation, no wheezes/rhonchi/rales  Cardiovascular:  RRR, normal S1/S2, no M/R/G  Abdomen:  Soft, nontender, +bowel sounds  Extremities: Trace bilateral lower extremity edema  Pulses: 2+ bilat carotid    2+ bilat radial    2+ bilat femoral        Medications:    furosemide  60 mg IntraVENous BID    spironolactone  12.5 mg Oral Daily    ascorbic acid  500 mg Oral Daily    aspirin  81 mg Oral Daily    atorvastatin  10 mg Oral Daily    tamsulosin  0.8 mg Oral Daily    sodium chloride flush  5-40 mL IntraVENous 2 times per day    enoxaparin  40 mg SubCUTAneous Daily    metoprolol succinate  25 mg Oral Daily    miconazole   Topical BID    insulin lispro  0-6 Units SubCUTAneous TID WC    insulin lispro  0-3 Units SubCUTAneous Nightly    lisinopril  10 mg Oral Daily      sodium chloride      dextrose         Lab Data:  CBC: No results for input(s): WBC, HGB, PLT in the last 72 hours.   BMP:    Recent Labs     05/02/22  0545 05/03/22  0592 05/04/22  0531    141 145   K 3.4* 3.9 3.6   CO2 27 30 30   BUN 19 20 22*   CREATININE 1.0 1.1 1.1       Lab Results   Component Value Date    CHOL 94 05/01/2022    HDL 40 05/01/2022    TRIG 57 05/01/2022     Echo 4/30/2022:  Overall left ventricular systolic function appears moderate to severely reduced. Ejection fraction is visually estimated to be 30-35% with  diffuse hypokinesis. Normal left ventricular wall thickness. Left ventricular cavity size is mildly dilated. Diastolic filling parameters  suggest grade II diastolic dysfunction. The right ventricle appears mildly dilated with normal systolic function. The left atrium is severely dilated. Moderate tricuspid regurgitation. Mild mitral regurgitation. Trivial aortic regurgitation. Estimated pulmonary artery systolic pressure is moderately elevated at 53 mmHg assuming a right atrial pressure of 3 mmHg. Assessment / Plan:   1. Acute on chronic combined systolic and diastolic CHF, class III  Papa Schuster appears as if he is nearing euvolemic status. I think I would hold any further Lasix after tonight's dose. If he is doing well in the morning I think he can be transitioned to his oral diuretics with his lisinopril hydrochlorothiazide 10-12.5 mg daily. He is also on valsartan 160 mg daily. He is on atenolol but I would change this to Toprol-XL 25 mg daily given his reduced left ventricular systolic function. He is not a candidate for an AICD given his advanced age. I would add Aldactone 12.5 mg daily as well in the morning and lieu of any IV diuretics. We can consider an SGLT2 inhibitor as an outpatient. He will need follow-up in 1 week. 2.  CAD of native coronary arteries without angina  Continue beta-blocker and statin therapy. He has no symptoms suggestive of angina at this time. Given his age and lack of angina I do not think that I would pursue an ischemic work-up just yet.     3.  Hyperlipidemia with goal LDL less than 70 mg/dL  Continue you his low-dose atorvastatin 10 mg daily as this was his home dose and likely what he was able to tolerate.               Signed:  Ara Ac MD

## 2022-05-04 NOTE — PROGRESS NOTES
HF RN continues to follow. Per chart review, pt with possible dc in 24 to 48 hours. I spoke with bedside RN, Kevin Robledo, and pt has been able to 'teachback' HF education. She has continued to reiterate the material at bedside. Dietician saw pt today as well, see note. Will continue to follow to assist with making 7 day f/u appt.

## 2022-05-04 NOTE — PLAN OF CARE
Problem: Safety - Adult  Goal: Free from fall injury  Outcome: Progressing     Problem: ABCDS Injury Assessment  Goal: Absence of physical injury  Outcome: Progressing     Problem: Chronic Conditions and Co-morbidities  Goal: Patient's chronic conditions and co-morbidity symptoms are monitored and maintained or improved  Outcome: Progressing     Problem: Pain  Goal: Verbalizes/displays adequate comfort level or baseline comfort level  Outcome: Progressing     Problem: Cardiovascular - Adult  Goal: Maintains optimal cardiac output and hemodynamic stability  Outcome: Progressing  Flowsheets (Taken 5/3/2022 2037)  Maintains optimal cardiac output and hemodynamic stability: Assess for signs of decreased cardiac output     Problem: Metabolic/Fluid and Electrolytes - Adult  Goal: Electrolytes maintained within normal limits  Outcome: Progressing  Flowsheets (Taken 5/3/2022 2037)  Electrolytes maintained within normal limits: Monitor labs and assess patient for signs and symptoms of electrolyte imbalances   Patient alert and oriented. Patient educated on safety precautions. Bed alarm, non skids in place with call light in reach. Patient educated on cardiac output and importance of maintaining sufficient O2 saturation. Patient O2 sat decreases with sleep while on RA. O2 sat 97% 2L/NC. Denies pain. Voids per urinal at the bedside. Nystatin powder to groin area. Tolerated well. Up with assistance. Will continue to monitor.

## 2022-05-04 NOTE — PROGRESS NOTES
Hospitalist Progress Note      PCP: Ricarda Evans MD    Date of Admission: 4/30/2022    Chief Complaint: Difficulty urinating and leg swelling    Hospital Course: 59-year-old male admitted to the hospital with CHF exacerbation. Initial symptoms were difficulty urinating and leg swelling  Cardiology on board. Receiving IV Lasix    Echo shows systolic and diastolic dysfunction    History of known abdominal aortic aneurysm greater than 6 cm-medically managed      Subjective: Good spirits today he is jovial.  Wants to go home soon      Medications:  Reviewed    Infusion Medications    sodium chloride      dextrose       Scheduled Medications    furosemide  60 mg IntraVENous BID    spironolactone  12.5 mg Oral Daily    ascorbic acid  500 mg Oral Daily    aspirin  81 mg Oral Daily    atorvastatin  10 mg Oral Daily    tamsulosin  0.8 mg Oral Daily    sodium chloride flush  5-40 mL IntraVENous 2 times per day    enoxaparin  40 mg SubCUTAneous Daily    metoprolol succinate  25 mg Oral Daily    miconazole   Topical BID    insulin lispro  0-6 Units SubCUTAneous TID WC    insulin lispro  0-3 Units SubCUTAneous Nightly    lisinopril  10 mg Oral Daily     PRN Meds: calcium carbonate, sodium chloride flush, sodium chloride, ondansetron **OR** ondansetron, polyethylene glycol, acetaminophen **OR** acetaminophen, glucose, dextrose, glucagon (rDNA), dextrose, perflutren lipid microspheres      Intake/Output Summary (Last 24 hours) at 5/4/2022 0926  Last data filed at 5/4/2022 0749  Gross per 24 hour   Intake 720 ml   Output 2275 ml   Net -1555 ml       Physical Exam Performed:    BP (!) 112/96   Pulse 90   Temp 98.6 °F (37 °C) (Oral)   Resp 19   Ht 6' 2\" (1.88 m)   Wt 184 lb 11.9 oz (83.8 kg)   SpO2 100%   BMI 23.72 kg/m²     General appearance: Frail appearing male sitting in stretcher no acute distress  HEENT: Pupils equal, round, and reactive to light. Conjunctivae/corneas clear.   Neck: Supple, with full range of motion. No jugular venous distention. Trachea midline. Respiratory:  Normal respiratory effort. Clear to auscultation, bilaterally without Rales/Wheezes/Rhonchi. Cardiovascular: Regular rate and rhythm with normal S1/S2 without murmurs, rubs or gallops. Abdomen: Soft, non-tender, non-distended with normal bowel sounds. Musculoskeletal: No clubbing, cyanosis or edema bilaterally. Full range of motion without deformity. Skin: Skin color, texture, turgor normal.  No rashes or lesions. Neurologic:  Neurovascularly intact without any focal sensory/motor deficits. Cranial nerves: II-XII intact, grossly non-focal.  Psychiatric: Alert and oriented, thought content appropriate, normal insight  Capillary Refill: Brisk,3 seconds, normal   Peripheral Pulses: +2 palpable, equal bilaterally   Extremities: Slightly decreased erythema from 5/1/2022 exam less swollen    Labs:   No results for input(s): WBC, HGB, HCT, PLT in the last 72 hours. Recent Labs     05/02/22  0545 05/03/22  0551 05/04/22  0531    141 145   K 3.4* 3.9 3.6    102 103   CO2 27 30 30   BUN 19 20 22*   CREATININE 1.0 1.1 1.1   CALCIUM 8.1* 8.8 8.8     No results for input(s): AST, ALT, BILIDIR, BILITOT, ALKPHOS in the last 72 hours. No results for input(s): INR in the last 72 hours. No results for input(s): Verlena Harjit in the last 72 hours. Urinalysis:      Lab Results   Component Value Date    NITRU Negative 04/30/2022    WBCUA 12 04/30/2022    BACTERIA None Seen 04/30/2022    RBCUA 3 04/30/2022    BLOODU Trace 04/30/2022    SPECGRAV 1.013 04/30/2022    GLUCOSEU Negative 04/30/2022       Radiology:  XR CHEST (2 VW)   Final Result   Pulmonary edema and small bilateral pleural effusions. Echo    Overall left ventricular systolic function appears moderate to severely    reduced. Ejection fraction is visually estimated to be 30-35% with diffuse    hypokinesis. Normal left ventricular wall thickness.  Left ventricular cavity    size is mildly dilated. Diastolic filling parameters suggest grade II    diastolic dysfunction.    The right ventricle appears mildly dilated with normal systolic function.    The left atrium is severely dilated.    Moderate tricuspid regurgitation.    Mild mitral regurgitation.    Trivial aortic regurgitation.    Estimated pulmonary artery systolic pressure is moderately elevated at 53    mmHg assuming a right atrial pressure of 3 mmHg.           Assessment/Plan:    Active Hospital Problems    Diagnosis     Acute on chronic systolic heart failure (HCC) [I50.23]      Priority: Medium    AAA (abdominal aortic aneurysm) without rupture ([facility]) [I71.4]      Priority: Medium    Coronary artery disease involving native coronary artery of native heart without angina pectoris [I25.10]      Priority: Medium    Type 2 diabetes mellitus with hyperglycemia, without long-term current use of insulin ([facility]) [E11.65]      Priority: Medium    Essential hypertension [I10]      Priority: Medium     Acute systolic and diastolic heart failure exacerbation sec to fluid overload  EF low with lv dysfunction and diastolic dysfunction  Continue toprol xl/aldactone/lisinopril. On IV Lasix. Norvasc and HCTZ DCd  Daily weights (202-->184)  Fluid restrict  Aldactone added by Dr Jyothi Funk      Diabetes mellitus type 2-controlled with insulin  Metformin on hold for now  [facility]    Abdominal aortic aneurysm  Medical management with hypertension control    Coronary artery disease with history of CABG  Continue aspirin and statin and beta-blocker. No anginal symptoms      AAA (abdominal aortic aneurysm) without rupture   Medically managed  DVT Prophylaxis: lovenox  Diet: ADULT DIET; Regular; 4 carb choices (60 gm/meal); Low Sodium (2 gm); 1500 ml  Code Status: DNR-CCA      Dispo -Home in the next 24 to 48 hours.     Dmitry Mcdonald MD

## 2022-05-05 VITALS
SYSTOLIC BLOOD PRESSURE: 132 MMHG | DIASTOLIC BLOOD PRESSURE: 91 MMHG | HEART RATE: 76 BPM | RESPIRATION RATE: 17 BRPM | BODY MASS INDEX: 23.06 KG/M2 | TEMPERATURE: 98.7 F | WEIGHT: 179.68 LBS | OXYGEN SATURATION: 100 % | HEIGHT: 74 IN

## 2022-05-05 LAB
ANION GAP SERPL CALCULATED.3IONS-SCNC: 12 MMOL/L (ref 3–16)
BUN BLDV-MCNC: 28 MG/DL (ref 7–20)
CALCIUM SERPL-MCNC: 9.1 MG/DL (ref 8.3–10.6)
CHLORIDE BLD-SCNC: 99 MMOL/L (ref 99–110)
CO2: 29 MMOL/L (ref 21–32)
CREAT SERPL-MCNC: 1 MG/DL (ref 0.8–1.3)
GFR AFRICAN AMERICAN: >60
GFR NON-AFRICAN AMERICAN: >60
GLUCOSE BLD-MCNC: 132 MG/DL (ref 70–99)
GLUCOSE BLD-MCNC: 92 MG/DL (ref 70–99)
GLUCOSE BLD-MCNC: 94 MG/DL (ref 70–99)
GLUCOSE BLD-MCNC: 96 MG/DL (ref 70–99)
MAGNESIUM: 1.9 MG/DL (ref 1.8–2.4)
PERFORMED ON: ABNORMAL
PERFORMED ON: NORMAL
PERFORMED ON: NORMAL
POTASSIUM SERPL-SCNC: 3.5 MMOL/L (ref 3.5–5.1)
SODIUM BLD-SCNC: 140 MMOL/L (ref 136–145)

## 2022-05-05 PROCEDURE — 80048 BASIC METABOLIC PNL TOTAL CA: CPT

## 2022-05-05 PROCEDURE — 97530 THERAPEUTIC ACTIVITIES: CPT

## 2022-05-05 PROCEDURE — 2700000000 HC OXYGEN THERAPY PER DAY

## 2022-05-05 PROCEDURE — 2580000003 HC RX 258: Performed by: INTERNAL MEDICINE

## 2022-05-05 PROCEDURE — 6370000000 HC RX 637 (ALT 250 FOR IP): Performed by: INTERNAL MEDICINE

## 2022-05-05 PROCEDURE — 6360000002 HC RX W HCPCS: Performed by: INTERNAL MEDICINE

## 2022-05-05 PROCEDURE — 36415 COLL VENOUS BLD VENIPUNCTURE: CPT

## 2022-05-05 PROCEDURE — 83735 ASSAY OF MAGNESIUM: CPT

## 2022-05-05 PROCEDURE — 97116 GAIT TRAINING THERAPY: CPT

## 2022-05-05 PROCEDURE — 94680 O2 UPTK RST&XERS DIR SIMPLE: CPT

## 2022-05-05 PROCEDURE — 99232 SBSQ HOSP IP/OBS MODERATE 35: CPT | Performed by: INTERNAL MEDICINE

## 2022-05-05 PROCEDURE — 94761 N-INVAS EAR/PLS OXIMETRY MLT: CPT

## 2022-05-05 RX ORDER — FUROSEMIDE 40 MG/1
40 TABLET ORAL DAILY
Qty: 60 TABLET | Refills: 3 | Status: SHIPPED | OUTPATIENT
Start: 2022-05-06 | End: 2022-05-05

## 2022-05-05 RX ORDER — SPIRONOLACTONE 25 MG/1
12.5 TABLET ORAL DAILY
Qty: 30 TABLET | Refills: 3 | Status: SHIPPED | OUTPATIENT
Start: 2022-05-06

## 2022-05-05 RX ORDER — LISINOPRIL 10 MG/1
10 TABLET ORAL DAILY
Qty: 30 TABLET | Refills: 3 | Status: SHIPPED | OUTPATIENT
Start: 2022-05-06

## 2022-05-05 RX ORDER — METOPROLOL SUCCINATE 25 MG/1
25 TABLET, EXTENDED RELEASE ORAL DAILY
Qty: 30 TABLET | Refills: 3 | Status: SHIPPED | OUTPATIENT
Start: 2022-05-06

## 2022-05-05 RX ORDER — FUROSEMIDE 40 MG/1
40 TABLET ORAL DAILY
Qty: 60 TABLET | Refills: 3 | Status: SHIPPED | OUTPATIENT
Start: 2022-05-06

## 2022-05-05 RX ORDER — CALCIUM CARBONATE 200(500)MG
500 TABLET,CHEWABLE ORAL 3 TIMES DAILY PRN
Qty: 12 TABLET | Refills: 2 | Status: SHIPPED | OUTPATIENT
Start: 2022-05-05 | End: 2022-06-04

## 2022-05-05 RX ORDER — VALSARTAN 160 MG/1
160 TABLET ORAL DAILY
Qty: 30 TABLET | Refills: 3 | Status: SHIPPED | OUTPATIENT
Start: 2022-05-05 | End: 2022-05-05 | Stop reason: HOSPADM

## 2022-05-05 RX ORDER — FUROSEMIDE 40 MG/1
40 TABLET ORAL DAILY
Status: DISCONTINUED | OUTPATIENT
Start: 2022-05-05 | End: 2022-05-05 | Stop reason: HOSPADM

## 2022-05-05 RX ORDER — FUROSEMIDE 40 MG/1
40 TABLET ORAL DAILY
Qty: 60 TABLET | Refills: 3 | Status: SHIPPED | OUTPATIENT
Start: 2022-05-05 | End: 2022-05-05 | Stop reason: HOSPADM

## 2022-05-05 RX ADMIN — SPIRONOLACTONE 12.5 MG: 25 TABLET ORAL at 08:48

## 2022-05-05 RX ADMIN — LISINOPRIL 10 MG: 10 TABLET ORAL at 08:48

## 2022-05-05 RX ADMIN — METOPROLOL SUCCINATE 25 MG: 25 TABLET, EXTENDED RELEASE ORAL at 08:48

## 2022-05-05 RX ADMIN — Medication 10 ML: at 08:48

## 2022-05-05 RX ADMIN — OXYCODONE HYDROCHLORIDE AND ACETAMINOPHEN 500 MG: 500 TABLET ORAL at 08:48

## 2022-05-05 RX ADMIN — MICONAZOLE NITRATE: 2 POWDER TOPICAL at 08:48

## 2022-05-05 RX ADMIN — ENOXAPARIN SODIUM 40 MG: 100 INJECTION SUBCUTANEOUS at 08:48

## 2022-05-05 RX ADMIN — ASPIRIN 81 MG: 81 TABLET, COATED ORAL at 08:48

## 2022-05-05 RX ADMIN — ATORVASTATIN CALCIUM 10 MG: 10 TABLET, FILM COATED ORAL at 08:48

## 2022-05-05 RX ADMIN — TAMSULOSIN HYDROCHLORIDE 0.8 MG: 0.4 CAPSULE ORAL at 08:48

## 2022-05-05 RX ADMIN — FUROSEMIDE 40 MG: 40 TABLET ORAL at 12:08

## 2022-05-05 NOTE — CARE COORDINATION
DISCHARGE SUMMARY     DATE OF DISCHARGE: 5/5/2022    DISCHARGE DESTINATION: Home with family    FACILITY  Name: [facility]  Address:  [facility]   Phone:  [phone]  Fax:  [phone]    Nursing will see this patient on Saturday morning 5/7/2022    DME received: Home oxygen    TRANSPORTATION: Family  Name: Kaleigh Vazquez  Relationship: daughter  Chelsie Sandoval up Time: TBD by patient, family and/or medical staff. Phone Number: [phone]    COMMENTS: SW met with patient alone at bedside regarding discharge needs. He is comfortable with services listed above from home care. SW also referred patient to [facility] per his daughter's request. No further needs noted unless indicated by patient, family and/or medical staff. Respectfully submitted,    VINI Henao  [facility]   [phone]    Electronically signed by VINI De La Torre on 5/5/2022 at 1:58 PM

## 2022-05-05 NOTE — PROGRESS NOTES
Peripheral IV and telemetry monitor removed without issue. AVS printed and reviewed with patient and daughter; additional 30 minutes of CHF education with emphasis on self care, medications, and follow-up appointments, as well as home oxygen. They were given the opportunity to ask questions, and verbalized understanding of all discharge instructions. Patient taken via wheelchair by this RN to private vehicle to return home.

## 2022-05-05 NOTE — CARE COORDINATION
RALPH faxed  EMANI/AVS and home care orders to [facility] at [phone]. No further needs noted at this time. Respectfully submitted,    VINI Stevenson  [facility]   [phone]    Electronically signed by VINI Kuhn on 5/5/2022 at 4:47 PM 24-Dec-2020

## 2022-05-05 NOTE — DISCHARGE SUMMARY
Hospital Medicine Discharge Summary    Patient: Rafal Garcia Sr.     Gender: male  : 1933   Age: 80 y.o. MRN: [id number]    Admitting Physician: Cristino Castro MD  Discharge Physician: Allen Cha MD     Code Status: Prior     Admit Date: 2022   Discharge Date:   22    Disposition:  Home [place] [address]    Discharge Diagnoses:  Acute systolic and diastolic heart failure exacerbation sec to fluid overload. Weight reduced from 202 to 184lbs. Fluid restrict  Aldactone added by Dr Reshma Limon        Diabetes mellitus type 2-controlled with insulin  [employer] educator consulted.     Abdominal aortic aneurysm without rupture  Medical management with hypertension control     Coronary artery disease with history of CABG  Continue ASA/ Statin/BB. No anginal symptoms        Follow-up appointments:  one week in CHF clinic    Outpatient to do list: low Na diet/fluid restrict/ daily weights    Condition at Discharge:  [address] Course:   80 y.o. male who presented to [facility] [place] with 2 to 3-day history of increasing lower extremity swelling and difficulty urinating. Patient is also had dyspnea without chest pain. Daughter who accompanies the patient states that he has had a hard time urinating. They report some redness of his lower extremities as well. Patient denies incontinence. He had advised seeking some follow-up for an abdominal aortic aneurysm of over 6cm-medically managed. Had a bypass in . He was diagnosed with CHF exacerbation and started on lasix diuresis iv. Echo shows systolic and diastolic dysfunction  He responded to diuresis with weight loss and symptom improvement and was DCd to FU in CHF clinic in 1 week.     Discharge Medications:   Discharge Medication List as of 2022  3:50 PM      START taking these medications    Details   metoprolol succinate (TOPROL XL) 25 MG extended release tablet Take 1 tablet by mouth daily, Disp-30 tablet, R-3Normal spironolactone (ALDACTONE) 25 MG tablet Take 0.5 tablets by mouth daily, Disp-30 tablet, R-3Normal      miconazole (MICOTIN) 2 % powder Apply topically 2 times daily. , Disp-45 g, R-1, Normal      calcium carbonate (TUMS) 500 MG chewable tablet Take 1 tablet by mouth 3 times daily as needed for Heartburn, Disp-12 tablet, R-2Normal      lisinopril (PRINIVIL;ZESTRIL) 10 MG tablet Take 1 tablet by mouth daily, Disp-30 tablet, R-3Normal           Discharge Medication List as of 5/5/2022  3:50 PM      CONTINUE these medications which have CHANGED    Details   furosemide (LASIX) 40 MG tablet Take 1 tablet by mouth daily, Disp-60 tablet, R-3Normal           Discharge Medication List as of 5/5/2022  3:50 PM      CONTINUE these medications which have NOT CHANGED    Details   linagliptin (TRADJENTA) 5 MG tablet Take 5 mg by mouth dailyHistorical Med      metFORMIN ER (GLUCOPHAGE-XR) 750 MG XR tablet Take 1,500 mg by mouth daily (with breakfast) 2 tabs in morning Historical Med      atorvastatin (LIPITOR) 10 MG tablet Take 10 mg by mouth daily. tamsulosin (FLOMAX) 0.4 MG capsule Take 0.8 mg by mouth daily Historical Med      aspirin 81 MG EC tablet Take 81 mg by mouth daily. vitamin E 400 UNIT capsule Take 400 Units by mouth daily. niacin 100 MG tablet Take 100 mg by mouth daily (with breakfast). Omega-3 Fatty Acids (FISH OIL) 1000 MG CAPS Take 3,000 mg by mouth 3 times daily. ascorbic acid (VITAMIN C) 500 MG tablet Take 500 mg by mouth daily.            Discharge Medication List as of 5/5/2022  3:50 PM      STOP taking these medications       valsartan (DIOVAN) 160 MG tablet Comments:   Reason for Stopping:         mupirocin (BACTROBAN) 2 % ointment Comments:   Reason for Stopping:         NIFEdipine (NIFEDICAL XL) 30 MG CR tablet Comments:   Reason for Stopping:         lisinopril-hydroCHLOROthiazide (PRINZIDE;ZESTORETIC) 20-25 MG per tablet Comments:   Reason for Stopping:         atenolol (TENORMIN) 25 MG tablet Comments:   Reason for Stopping:               Discharge ROS:  A complete review of systems was asked and negative except for above     Discharge Exam:    BP (!) 132/91   Pulse 76   Temp 98.7 °F (37.1 °C) (Oral)   Resp 17   Ht 6' 2\" (1.88 m)   Wt 179 lb 10.8 oz (81.5 kg)   SpO2 100%   BMI 23.07 kg/m²   General appearance: Frail appearing male sitting in stretcher no acute distress  HEENT: Pupils equal, round, and reactive to light. Conjunctivae/corneas clear. Neck: Supple, with full range of motion. No jugular venous distention. Trachea midline. Respiratory:  Normal respiratory effort. Clear to auscultation, bilaterally without Rales/Wheezes/Rhonchi. Cardiovascular: Regular rate and rhythm with normal S1/S2 without murmurs, rubs or gallops. Abdomen: Soft, non-tender, non-distended with normal bowel sounds. Musculoskeletal: No clubbing, cyanosis or edema bilaterally. Full range of motion without deformity. Skin: Skin color, texture, turgor normal.  No rashes or lesions. Neurologic:  Neurovascularly intact without any focal sensory/motor deficits. Cranial nerves: II-XII intact, grossly non-focal.  Psychiatric: Alert and oriented, thought content appropriate, normal insight  Capillary Refill: Brisk,3 seconds, normal   Peripheral Pulses: +2 palpable, equal bilaterally   Extremities: Slightly decreased erythema from 5/1/2022 exam less swollen    Labs:  For convenience and continuity at follow-up the following most recent labs are provided:    Lab Results   Component Value Date    WBC 7.4 04/30/2022    HGB 12.0 04/30/2022    HCT 37.6 04/30/2022    MCV 87.3 04/30/2022     04/30/2022     05/05/2022    K 3.5 05/05/2022    K 3.6 04/30/2022    CL 99 05/05/2022    CO2 29 05/05/2022    BUN 28 05/05/2022    CREATININE 1.0 05/05/2022    CALCIUM 9.1 05/05/2022    LDLCALC 43 05/01/2022    TRIG 57 05/01/2022     No results found for: INR    Radiology:  Echo Complete    Result Date: 5/2/2022  Transthoracic Echocardiography Report (TTE)  Demographics   Patient Name      Analisa Harper. Date of Study     05/02/2022          Gender              Male   Patient Number    [id number]          Date of Birth       01/26/1933   Visit Number      [id number]           Age                 80 year(s)   Accession Number  [id number]          Room Number         5649   Corporate ID      [id number]             Sonographer         Gabriela Balderas MD  Interpreting        [address] [place] [address].  Physician                             Physician           Schuyler Lee MD  Procedure Type of Study   TTE procedure:ECHOCARDIOGRAM COMPLETE 2D W DOPPLER W COLOR. Procedure Date Date: 05/02/2022 Start: 10:13 AM Study Location: [facility] Echo Lab Technical Quality: Adequate visualization Indications:Congestive heart failure. Additional Indications:DM,HLD,HTN,MI. Patient Status: Routine Height: 74 inches Weight: 188 pounds BSA: 2.12 m2 BMI: 24.14 kg/m2 BP: 119/53 mmHg  Conclusions   Summary  Overall left ventricular systolic function appears moderate to severely  reduced. Ejection fraction is visually estimated to be 30-35% with diffuse  hypokinesis. Normal left ventricular wall thickness. Left ventricular cavity  size is mildly dilated. Diastolic filling parameters suggest grade II  diastolic dysfunction. The right ventricle appears mildly dilated with normal systolic function. The left atrium is severely dilated. Moderate tricuspid regurgitation. Mild mitral regurgitation. Trivial aortic regurgitation. Estimated pulmonary artery systolic pressure is moderately elevated at 53  mmHg assuming a right atrial pressure of 3 mmHg.    Signature   ------------------------------------------------------------------  Electronically signed by Andrea Esparza MD  (Interpreting physician) on 05/02/2022 at 12:59 PM ------------------------------------------------------------------   Findings   Left Ventricle  Overall left ventricular systolic function appears moderate to severely  reduced. Ejection fraction is visually estimated to be 30-35% with diffuse  hypokinesis. Normal left ventricular wall thickness. Left ventricular cavity size is mildly dilated. Diastolic filling parameters  suggest grade II diastolic dysfunction. Mitral Valve  Mild mitral annular calcification. Mild mitral regurgitation. No evidence of mitral stenosis. Left Atrium  The left atrium is severely dilated. Aortic Valve  Tricuspid aortic valve. Trivial aortic regurgitation. No evidence of aortic valve stenosis. Aorta  The aortic root is normal in size. Right Ventricle  The right ventricle appears mildly dilated with normal systolic function. Tricuspid Valve  Tricuspid valve is structurally normal.  Moderate tricuspid regurgitation. No evidence of tricuspid stenosis. Right Atrium  The right atrium is moderately dilated. Pulmonic Valve  The pulmonic valve is not well visualized. Trivial pulmonic regurgitation present. No evidence of pulmonic valve stenosis. Pericardial Effusion  No pericardial effusion noted. Pleural Effusion  No pleural effusion. Miscellaneous  IVC size is normal (<2.1cm) and collapses > 50% with respiration consistent  with normal RA pressure (3mmHg). Estimated pulmonary artery systolic pressure is moderately elevated at 53  mmHg assuming a right atrial pressure of 3 mmHg.   M-Mode/2D Measurements (cm)   LV Diastolic Dimension: 8.12 cm LV Systolic Dimension: 8.06 cm  LV Septum Diastolic: 0.47 cm  LV PW Diastolic: 0.70 cm        AO Root Dimension: 3.4 cm  RV Diastolic Dimension: 9.16 cm                                  LA Area: 27.2 cm2  LVOT: 2.2 cm                    LA volume/Index: 97 ml /46 ml/m2  Doppler Measurements   AV Peak Velocity: 162 cm/s     MV Peak E-Wave: 84.1 cm/s  AV Peak Gradient: 10.5 mmHg MV Peak A-Wave: 63.2 cm/s  LVOT Peak Velocity: 69.6 cm/s  MV E/A Ratio: 1.33                                 MV Mean Gradient: 2 mmHg  TR Velocity:355 cm/s           MV Max PG: 3 mmHg  TR Gradient:50.41 mmHg         MV Vmax:91.9 cm/s  Estimated RAP:3 mmHg           MV VTI:20.5 cm/s  Estimated RVSP: 53 mmHg  E' Septal Velocity: 4.07 cm/s  MV Area (continuity): 3.5 cm2  E' Lateral Velocity: 5.87 cm/s MV Deceleration Time: 201 msec  PV Peak Velocity: 110 cm/s  PV Peak Gradient: 4.84 mmHg   Aortic Valve   Peak Velocity: 162 cm/s  Peak Gradient: 10.5 mmHg  Aorta   Aortic Root: 3.4 cm  Ascending Aorta: 3.3 cm  LVOT Diameter: 2.2 cm      XR CHEST (2 VW)    Result Date: 4/30/2022  EXAMINATION: TWO XRAY VIEWS OF THE CHEST 4/30/2022 8:01 am COMPARISON: None. HISTORY: ORDERING SYSTEM PROVIDED HISTORY: SOB TECHNOLOGIST PROVIDED HISTORY: Reason for exam:->SOB Reason for Exam: Leg Swelling; Swelling; Abrasion; Fall FINDINGS: Status post median sternotomy. Cardiac leads project over the chest. Diffuse interstitial opacity is seen bilaterally. Blunting of the costophrenic angles. No gross pneumothorax. Cardiomegaly. Calcification of the aorta. Degenerative change of the glenohumeral joints. Pulmonary edema and small bilateral pleural effusions. EKG     Rhythm: normal sinus   Rate: normal  Clinical Impression: no acute changes        The patient was seen and examined on day of discharge and this discharge summary is in conjunction with any daily progress note from day of discharge. Time Spent on discharge is 30 minutes  in the examination, evaluation, counseling and review of medications and discharge plan. Note that more than 30 minutes was spent in preparing discharge papers, discussing discharge with patient, medication review, etc.       Signed:    Ganesh Stahl MD   5/9/2022      Thank you Sil Lucas MD for the opportunity to be involved in this patient's care.  If you have any questions or concerns please feel free to contact me at [facility]

## 2022-05-05 NOTE — PROGRESS NOTES
DC order noted. I requested RN re-weigh pt on standing scale for a more accurate dc dry weight. Wt 179 lbs. I spoke with Cardiac Rehab so they are aware of dc order. I also reached out to RALPH who was already working on pt. She states pt is to have a home O2 eval. As of now, family is agreeable to home care (agency pending per RALPH note). I recommended home RN since new dx as well as therapy. SW aware of family request for info on COA/MOW (needs to be low Na diet). Pt is also agreeable to outpt [facility] program. I scheduled a 7 day follow up appt with his Cardiologist from [facility], Dr Julio Diop, for 5/9 at 11:30 at his usual [facility] location. This appt has been placed on his AVS and bedside RN, Thanh Chi, is aware. Pt has our [employer] phone number if pt or family would have any further questions or concerns.

## 2022-05-05 NOTE — CONSULTS
[facility]  CARDIOPULMONARY PHASE I CONSULT        NAME:  Pretty Vargas. MEDICAL RECORD NUMBER:  [id number]  AGE: 80 y.o.    GENDER: male  : 1933  TODAY'S DATE:  2022    Subjective:     VISIT TYPE: evaluation     ADMITTING PHYSICIAN:  Sylvain Sanchez MD     PAST MEDICAL HISTORY        Diagnosis Date    Diabetes mellitus ([facility] [address]     Hyperlipidemia     Hypertension     MI (myocardial infarction) ([facility] [address]        SOCIAL HISTORY    Social History     Tobacco Use    Smoking status: Former Smoker     Types: Cigarettes     Quit date: 1999     Years since quittin.0    Smokeless tobacco: Not on file   Substance Use Topics    Alcohol use: No    Drug use: No       ALLERGIES    Allergies   Allergen Reactions    Penicillins Hives       MEDICATIONS  Scheduled Meds:   furosemide  40 mg Oral Daily    spironolactone  12.5 mg Oral Daily    ascorbic acid  500 mg Oral Daily    aspirin  81 mg Oral Daily    atorvastatin  10 mg Oral Daily    tamsulosin  0.8 mg Oral Daily    sodium chloride flush  5-40 mL IntraVENous 2 times per day    enoxaparin  40 mg SubCUTAneous Daily    metoprolol succinate  25 mg Oral Daily    miconazole   Topical BID    insulin lispro  0-6 Units SubCUTAneous TID WC    insulin lispro  0-3 Units SubCUTAneous Nightly    lisinopril  10 mg Oral Daily       ADMIT DATE: 2022      Objective:     ADMISSION DIAGNOSIS:   Cellulitis of perineum [L03.315]  CHF (congestive heart failure), NYHA class I, acute on chronic, combined (HCC) [I50.43]  Acute congestive heart failure, unspecified heart failure type (HCC) [I50.9]     BP (!) 132/91   Pulse 76   Temp 98.7 °F (37.1 °C) (Oral)   Resp 17   Ht 6' 2\" (1.88 m)   Wt 179 lb 10.8 oz (81.5 kg)   SpO2 100%   BMI 23.07 kg/m²     ADMIT:  Weight: 202 lb 14.4 oz (92 kg)    TODAY: Weight: 179 lb 10.8 oz (81.5 kg)    Wt Readings from Last 3 Encounters:   22 179 lb 10.8 oz (81.5 kg)   06/26/15 215 lb (97.5 kg)   05/06/15 215 lb (97.5 kg)        ECHOCARDIOGRAM: 4/30/2022  Summary   Overall left ventricular systolic function appears moderate to severely   reduced. Ejection fraction is visually estimated to be 30-35% with diffuse   hypokinesis. Normal left ventricular wall thickness. Left ventricular cavity   size is mildly dilated. Diastolic filling parameters suggest grade II   diastolic dysfunction. The right ventricle appears mildly dilated with normal systolic function. The left atrium is severely dilated. Moderate tricuspid regurgitation. Mild mitral regurgitation. Trivial aortic regurgitation. Estimated pulmonary artery systolic pressure is moderately elevated at 53   mmHg assuming a right atrial pressure of 3 mmHg. HgBA1c:  No components found for: HGBA1C  LIPID PANEL:    Lab Results   Component Value Date    CHOL 94 05/01/2022    HDL 40 05/01/2022    TRIG 57 05/01/2022        Assessment:     CONSULTS:   IP CONSULT TO HOSPITALIST  IP CONSULT TO HEART FAILURE NURSE/COORDINATOR  IP CONSULT TO DIETITIAN  IP CONSULT TO CARDIOLOGY  IP CONSULT TO PALLIATIVE CARE  IP CONSULT TO CARDIAC REHAB  IP CONSULT TO SOCIAL WORK    Patient has a CARDIOLOGY CONSULT: Yes        EDUCATION STATUS: Patient and Caregiver (daughter Kezia Quintero)  [x]  Provided both written and verbal education on Cardiopulmonary Rehabilitation. []  Provided instructions for smoking cessation programs. []  Provided education of CAD risk factors. [x]  Provided recommendations on activity and exercise. Reviewed walking program starting with 5 minutes 3 times per day and progress as tolerated. Daughter reports that he has a \"circular path in his house\" that he could safely walk and will encourage. []  Provided education on medications. []  Provided education on coronary anatomy and coronary interventions. [x]  Other: Reviewed s/s of heart failure including peripheral edema, increased weight, SOB and cough.  Reviewed daily weight procedure, the importance of recording, 3/5 rule and when to notify MD. Daughter reports that he needs a new scale and that her sister is working to get him one. She also reports that he is \"not likely to record weights\" but family could assist with recording. Patient was previously given a log for weight recording and written material for heart failure management zones. CURRENT DIET: ADULT DIET; Regular; 4 carb choices (60 gm/meal); Low Sodium (2 gm); 1500 ml    EDUCATIONAL PACKETS PROVIDED- PRINTED FROM [employer]. Titles and material given:  Yes   []  Managing Heart Disease and Preventing Stroke  []  Heart Owner's Manual  [x]  Living Well with Heart Failure  []  Other:     PATIENT/CAREGIVER TEACHING: patient and daughter Janice Contreras   Level of patient/caregiver understanding able to:   [x] Verbalize understanding   [] Demonstrate understanding       [x] Teach back        [] Needs reinforcement     [x]  Other:  Daughter is able to verbalize s/s of heart failure, daily weight procedure and 3/5 rule. Patient needs some verbal cueing and reminders. TEACHING TIME:  22  minutes       Plan:       DISCHARGE PLAN:  Placement for patient upon discharge: home with support   Hospice Care:  no  Code Status: DNR-CCA  Discharge appointment scheduled: Yes     RECOMMENDATIONS:   [x]  Patient/Family instructed to call Cardiopulmonary Rehab ([phone]) upon discharge schedule the initial evaluation  [x]  Encourage to call Cardiopulmonary Rehabilitation with any questions. []  Referral to alternate Cardiopulmonary Rehabilitation facility. []  Other:    [] Appointment scheduled for  [x] Chooses to not schedule at this time. Patient states that he would \"like to talk with heart doctor first\". Daughter verbalizes interest in him attending the program and will discuss with the rest of the family. They may be interested in attending at the [facility] location for transportation convenience.   Provided contact number for [address] [place].           Electronically signed by Arabella Costa RN,MS on 5/5/2022 at 11:29 AM

## 2022-05-05 NOTE — PROGRESS NOTES
Referring Physician: Dr. Bruce Simpson  Reason for Consultation: CHF exacerbation  Chief Complaint: Short of breath    Subjective:   History of Present Illness:  Sharona Cisneros is a 80 y.o. patient who presented to the hospital with complaints of progressive shortness of breath, lower extremity edema, and scrotal edema. The patient typically receives his cardiac care through [facility]. His daughter is present bedside. He believes his symptoms only started over the past few days but later acknowledges that he does not particularly pay attention to the swelling in his legs. He began noticing difficulty in urinating secondary to the edema a few days ago. He denies associated chest pains. He is uncertain if he has gained weight. He reports compliance with his medications. He has known to have a large abdominal aortic aneurysm but his daughter says that he refused surgery. He also had coronary bypass >20 years ago. He lives at home alone. He endorses chronic dyspnea on exertion but is not particularly active with his advanced age. He denies PND or orthopnea but is short of breath with minimal activity. Interval history:  No acute overnight cardiac events. Patient feels shortness of breath is improving with diuresis. He notes the scrotal edema and LE edema continues to improve. He denies associated chest pains, lightheadedness, palpitations, PND, or orthopnea. He says he feels ready to go home. He is concerned about his diet because he eats fast food frequently. Past Medical History:   has a past medical history of Diabetes mellitus ([address] Hyperlipidemia, Hypertension, and MI (myocardial infarction) ([address] Surgical History:   has a past surgical history that includes Cardiac surgery and hernia repair. Social History:   reports that he quit smoking about 23 years ago. His smoking use included cigarettes. He does not have any smokeless tobacco history on file.  He reports that he does not drink alcohol and does not use drugs. Family History:  Denies premature coronary atherosclerosis. Home Medications:  Were reviewed and are listed in nursing record and/or below  Prior to Admission medications    Medication Sig Start Date End Date Taking? Authorizing Provider   metoprolol succinate (TOPROL XL) 25 MG extended release tablet Take 1 tablet by mouth daily 5/6/22  Yes Karime Mascorro MD   spironolactone (ALDACTONE) 25 MG tablet Take 0.5 tablets by mouth daily 5/6/22  Yes Karime Mascorro MD   miconazole (MICOTIN) 2 % powder Apply topically 2 times daily. 5/5/22  Yes Karime Mascorro MD   calcium carbonate (TUMS) 500 MG chewable tablet Take 1 tablet by mouth 3 times daily as needed for Heartburn 5/5/22 6/4/22 Yes Karime Mascorro MD   valsartan (DIOVAN) 160 MG tablet Take 1 tablet by mouth daily 5/5/22  Yes Karime Mascorro MD   linagliptin (TRADJENTA) 5 MG tablet Take 5 mg by mouth daily   Yes Historical Provider, MD   NIFEdipine (NIFEDICAL XL) 30 MG CR tablet Take 30 mg by mouth daily. Historical Provider, MD   lisinopril-hydroCHLOROthiazide (PRINZIDE;ZESTORETIC) 20-25 MG per tablet Take 0.5 tablets by mouth daily     Historical Provider, MD   metFORMIN ER (GLUCOPHAGE-XR) 750 MG XR tablet Take 1,500 mg by mouth daily (with breakfast) 2 tabs in morning     Historical Provider, MD   atorvastatin (LIPITOR) 10 MG tablet Take 10 mg by mouth daily. Historical Provider, MD   tamsulosin (FLOMAX) 0.4 MG capsule Take 0.8 mg by mouth daily     Historical Provider, MD   aspirin 81 MG EC tablet Take 81 mg by mouth daily. Historical Provider, MD   vitamin E 400 UNIT capsule Take 400 Units by mouth daily. Historical Provider, MD   niacin 100 MG tablet Take 100 mg by mouth daily (with breakfast). Historical Provider, MD   Omega-3 Fatty Acids (FISH OIL) 1000 MG CAPS Take 3,000 mg by mouth 3 times daily.     Historical Provider, MD   ascorbic acid (VITAMIN C) 500 MG tablet Take 500 mg by mouth daily. Historical Provider, MD      CURRENT Medications:  calcium carbonate (TUMS) chewable tablet 500 mg, TID PRN  spironolactone (ALDACTONE) tablet 12.5 mg, Daily  ascorbic acid (VITAMIN C) tablet 500 mg, Daily  aspirin EC tablet 81 mg, Daily  atorvastatin (LIPITOR) tablet 10 mg, Daily  tamsulosin (FLOMAX) capsule 0.8 mg, Daily  sodium chloride flush 0.9 % injection 5-40 mL, 2 times per day  sodium chloride flush 0.9 % injection 5-40 mL, PRN  0.9 % sodium chloride infusion, PRN  ondansetron (ZOFRAN-ODT) disintegrating tablet 4 mg, Q8H PRN   Or  ondansetron (ZOFRAN) injection 4 mg, Q6H PRN  polyethylene glycol (GLYCOLAX) packet 17 g, Daily PRN  acetaminophen (TYLENOL) tablet 650 mg, Q6H PRN   Or  acetaminophen (TYLENOL) suppository 650 mg, Q6H PRN  enoxaparin (LOVENOX) injection 40 mg, Daily  metoprolol succinate (TOPROL XL) extended release tablet 25 mg, Daily  miconazole (MICOTIN) 2 % powder, BID  insulin lispro (HUMALOG) injection vial 0-6 Units, TID WC  insulin lispro (HUMALOG) injection vial 0-3 Units, Nightly  glucose (GLUTOSE) 40 % oral gel 15 g, PRN  dextrose 50 % IV solution, PRN  glucagon (rDNA) injection 1 mg, PRN  dextrose 5 % solution, PRN  perflutren lipid microspheres (DEFINITY) injection 1.65 mg, ONCE PRN  lisinopril (PRINIVIL;ZESTRIL) tablet 10 mg, Daily    Allergies:  Penicillins     Review of Systems:   · Constitutional: no unanticipated weight loss. There's been no change in energy level, sleep pattern, or activity level. No fevers, chills. · Eyes: No visual changes or diplopia. No scleral icterus. · ENT: No Headaches, hearing loss or vertigo. No mouth sores or sore throat. · Cardiovascular: as reviewed in HPI  · Respiratory: No cough or wheezing, no sputum production. No hemoptysis. · Gastrointestinal: No abdominal pain, appetite loss, blood in stools. No change in bowel or bladder habits.   · Genitourinary: No dysuria, trouble voiding, or hematuria. · Musculoskeletal:  No gait disturbance, no joint complaints. · Integumentary: No rash or pruritis. · Neurological: No headache, diplopia, change in muscle strength, numbness or tingling. · Psychiatric: No anxiety or depression. · Endocrine: No temperature intolerance. No excessive thirst, fluid intake, or urination. No tremor. · Hematologic/Lymphatic: No abnormal bruising or bleeding, blood clots or swollen lymph nodes. · Allergic/Immunologic: No nasal congestion or hives. Objective:   PHYSICAL EXAM:    Vitals:    05/05/22 0845   BP: (!) 132/91   Pulse: 76   Resp: 17   Temp: 98.7 °F (37.1 °C)   SpO2: 100%    Weight: 179 lb 10.8 oz (81.5 kg)       General Appearance:  Alert, cooperative, no respiratory distress at rest.  Elderly. Head:  Normocephalic, without obvious abnormality, atraumatic. Eyes:  Pupils equal and round. No scleral icterus. Mouth: Moist mucosa, no pharyngeal erythema. Poor dentition. Nose: Nares normal. No drainage or sinus tenderness. Neck: Supple, symmetrical, trachea midline. No adenopathy. No tenderness/mass/nodules. No carotid bruit. JVD not appreciated today. Lungs:   Clear to auscultation bilaterally, respirations unlabored. No wheeze, rales, or rhonchi. Chest Wall:  No tenderness or deformity. Heart:  Regular rate. S1/S2 normal. No murmur, rub, or gallop. Abdomen:   Soft, non-tender, bowel sounds active. Musculoskeletal: No muscle wasting or digital clubbing. Extremities: Extremities normal, atraumatic. No cyanosis. Trace-1+ BLE edema. Pulses: 2+ radial and carotid pulses, symmetric. Skin: No rashes or lesions. Pysch: Normal mood and affect. Alert and oriented x 4. Neurologic: Moves all extremities. Follows commands.       Labs     CBC:   Lab Results   Component Value Date    WBC 7.4 04/30/2022    RBC 4.31 04/30/2022    HGB 12.0 04/30/2022    HCT 37.6 04/30/2022    MCV 87.3 04/30/2022    RDW 17.6 04/30/2022     04/30/2022 CMP:  Lab Results   Component Value Date     05/05/2022    K 3.5 05/05/2022    K 3.6 04/30/2022    CL 99 05/05/2022    CO2 29 05/05/2022    BUN 28 05/05/2022    CREATININE 1.0 05/05/2022    GFRAA >60 05/05/2022    LABGLOM >60 05/05/2022    GLUCOSE 92 05/05/2022    CALCIUM 9.1 05/05/2022     PT/INR:  No results found for: PTINR  HgBA1c:No results found for: LABA1C  Lab Results   Component Value Date    TROPONINI <0.01 04/30/2022     Cardiac Data     EKG: Personally interpreted. 4/30/2022. Sinus rhythm with frequent and consecutive PVCs. Left axis deviation. Poor R wave progression. Voltage criteria for LVH. Abdominal CT: 5/2017 Infrarenal abdominal aortic aneurysm measures up to 6.1 cm. Echocardiogram: Personally interpreted. 5/2/2022. Overall left ventricular systolic function appears moderate to severely reduced. Ejection fraction is visually estimated to be 30-35% with diffuse hypokinesis. Normal left ventricular wall thickness. Left ventricular cavity size is mildly dilated. Diastolic filling parameters suggest grade II diastolic dysfunction. The right ventricle appears mildly dilated with normal systolic function. The left atrium is severely dilated. Moderate tricuspid regurgitation. Mild mitral regurgitation. Trivial aortic regurgitation. Estimated pulmonary artery systolic pressure is moderately elevated at 53 mmHg assuming a right atrial pressure of 3 mmHg. Telemetry: Personally interpreted. Sinus with PVCs. Assessment and Plan   1) Acute on chronic systolic heart failure. EF 35%. NYHA III. Continue Toprol-XL, Aldactone, and lisinopril. Discontinued home medications of hydrochlorothiazide and nifedipine. Change Lasix to 40mg po daily. Discussion of SGLT2i can be deferred to the outpatient setting. Patient is not an ICD candidate with advanced age. Patient describes a high salt diet eating fast food for many meals. Weight 179.    2) CAD s/p CABG.  continue medical management risk factor modification including use of aspirin, statin, and beta-blocker. 3) AAA without rupture. Measured 6.1 cm five years ago but has refused intervention. Continue statin and attempts at strict blood pressure control. 4) Type 2 diabetes mellitus. Diabetes management per primary team.    5) Essential hypertension. Controlled. Goal BP <130/80. Continue medical therapy. Code status: The patient is alert and answers questions appropriately. He has capacity for medical decision-making. He endorses his wishes for DNR/DNI-CCA. Patient's daughter was present bedside for conversation and is in agreement. Overall, the problems requiring hospitalization are high in severity. Will sign off. Call with questions. Follow-up with his primary cardiologist within a week. Thank you for allowing us to participate in the care of [address] [place] [address]. Jose Bone [address]  5/5/2022 10:50 AM

## 2022-05-05 NOTE — PROGRESS NOTES
[facility]    Respiratory Therapy   Home Oxygen Evaluation        Name: Marion [address] [place] [address] Record Number: [id number]  Age: 80 y.o. Gender:  male   : 1933  Today's date: 2022  Room: P2I-4743/5276-01      Assessment        BP (!) 132/91   Pulse 76   Temp 98.7 °F (37.1 °C) (Oral)   Resp 17   Ht 6' 2\" (1.88 m)   Wt 179 lb 10.8 oz (81.5 kg)   SpO2 100%   BMI 23.07 kg/m²     Patient Active Problem List   Diagnosis    Acute anterior epistaxis    Acute on chronic systolic heart failure (HCC)    AAA [facility]abdominal aortic aneurysm) [facility] rupture [facility]    Coronary artery disease involving native coronary artery of native heart without angina pectoris    Type 2 diabetes mellitus with hyperglycemia, without long-term current use of insulin ([facility])    Essential hypertension       Social History:  Social History     Tobacco Use    Smoking status: Former Smoker     Types: Cigarettes     Quit date: 1999     Years since quittin.0    Smokeless tobacco: Not on file   Substance Use Topics    Alcohol use: No    Drug use: No       Patient Room Air saturation at rest 93%  Patient Room Air saturation upon ambulation 84%    Oxygen saturations of 88% or less on RA qualifies patient for Home Oxygen    Patient resting on 0  lmp  with an oxygen saturation of  93 %     Patient ambulated on 0 lpm with an oxygen saturation of 84%    Patient ambulated on 2 lpm with an oxygen saturation of 90%    Qualifying patient for home oxygen with ambulation @ 2 lpm.    In your clinical assessment does the Patient Require Portable Oxygen Tanks?     Yes              [facility] [employer] contacted to follow care of patients home oxygen needs on 2022 at 11:07 AM    Patient/caregiver was educated on Home Oxygen process:  Yes      Level of patient/caregiver understanding able to:   [] Verbalize understanding   [] Demonstrate understanding       [] Teach back        [] Needs reinforcement []  No available caregiver               []  Other:     Response to education:  Good     Time Spent with Home O2 Set Up:  15  minutes     nataly posey RCP on 5/5/2022 at 11:07 AM

## 2022-05-05 NOTE — PLAN OF CARE
Problem: Skin/Tissue Integrity  Goal: Absence of new skin breakdown  Description: 1. Monitor for areas of redness and/or skin breakdown  2. Assess vascular access sites hourly  3. Every 4-6 hours minimum:  Change oxygen saturation probe site  4. Every 4-6 hours:  If on nasal continuous positive airway pressure, respiratory therapy assess nares and determine need for appliance change or resting period. 5/5/2022 0524 by Kamryn Mccormack RN  Outcome: Progressing     Problem: Safety - Adult  Goal: Free from fall injury  5/5/2022 0524 by Kamryn Mccormack RN  Outcome: Progressing     Problem: Metabolic/Fluid and Electrolytes - Adult  Goal: Hemodynamic stability and optimal renal function maintained  5/5/2022 0524 by Kamryn Mccormack RN  Outcome: Progressing   Alert and oriented. Patient educated on safety precautions. Reminded to call for assistance with urinal. Safety precautions in place. Dressing in place to sacrum. Encouraged to turn and re position. Denies SOB or chest pain. Assist of 1 to transfer with cane. Call light in reach.

## 2022-05-05 NOTE — PROGRESS NOTES
Pharmacy Heart Failure Medication Reconciliation Note    Pt discharged from [facility] today. Chief Complaint   Patient presents with    Leg Swelling     increased leg swelling for the past week, h/o heart failure and CABGX5 20 years ago. Swelling     abdominal     Abrasion     right arm, right leg skin sores/ abraisions    Fall     pt fell at home at 0330, son had to help him up. [facility] has a diagnosis of combined systolic and diastolic heart failure (last EF = 30-35% on 5/2/22). Pertinent Labs:  BMP:   Lab Results   Component Value Date     05/05/2022    K 3.5 05/05/2022    K 3.6 04/30/2022    CL 99 05/05/2022    CO2 29 05/05/2022    BUN 28 05/05/2022    CREATININE 1.0 05/05/2022     BNP:   Lab Results   Component Value Date    PROBNP 14,491 05/03/2022    PROBNP 12,727 04/30/2022       Patient taking an ACEI / ARB / Entresto for EF </= 40%:  Yes   Patient taking a BETA BLOCKER (Coreg, Toprol XL or bisoprolol) for EF </= 40%: Yes  Patient taking a LOOP DIURETIC: Yes  Patient taking a ALDOSTERONE RECEPTOR ANTAGONIST for EF </= 35%: Yes  Patient taking an SGLT2I for EF </= 40%: No, cardiology will address in the outpatient setting    Patient has a diagnosis of Atrial Fibrillation: No. If yes, patient is on appropriate anticoagulation: N/A    Patient has a diagnosis of type 2 diabetes:  Yes. If yes, patient prescribed the following anti-hyperglycemic medication at discharge: Yes, Metformin, Tradjenta       Corrections to discharge medications include: Both valsartan and lisinopril-HCTZ prescribed at discharge. Contacted MD to discontinue valsartan and HCTZ.       Discharge Medications:         Medication List        START taking these medications      calcium carbonate 500 MG chewable tablet  Commonly known as: TUMS  Take 1 tablet by mouth 3 times daily as needed for Heartburn     * furosemide 40 MG tablet  Commonly known as: Lasix  Take 1 tablet by mouth daily     * furosemide 40 MG tablet  Commonly known as: LASIX  Take 1 tablet by mouth daily  Start taking on: May 6, 2022     lisinopril 10 MG tablet  Commonly known as: PRINIVIL;ZESTRIL  Take 1 tablet by mouth daily  Start taking on: May 6, 2022     metoprolol succinate 25 MG extended release tablet  Commonly known as: TOPROL XL  Take 1 tablet by mouth daily  Start taking on: May 6, 2022     miconazole 2 % powder  Commonly known as: MICOTIN  Apply topically 2 times daily. spironolactone 25 MG tablet  Commonly known as: ALDACTONE  Take 0.5 tablets by mouth daily  Start taking on: May 6, 2022           * This list has 2 medication(s) that are the same as other medications prescribed for you. Read the directions carefully, and ask your doctor or other care provider to review them with you.                 CONTINUE taking these medications      ascorbic acid 500 MG tablet  Commonly known as: VITAMIN C     aspirin 81 MG EC tablet     atorvastatin 10 MG tablet  Commonly known as: LIPITOR     fish oil 1000 MG Caps     metFORMIN 750 MG extended release tablet  Commonly known as: GLUCOPHAGE-XR     niacin 100 MG tablet     tamsulosin 0.4 MG capsule  Commonly known as: FLOMAX     Tradjenta 5 MG tablet  Generic drug: linagliptin     vitamin E 400 UNIT capsule            STOP taking these medications      atenolol 25 MG tablet  Commonly known as: TENORMIN     Diovan 160 MG tablet  Generic drug: valsartan     glipiZIDE 5 MG tablet  Commonly known as: GLUCOTROL     lisinopril-hydroCHLOROthiazide 20-25 MG per tablet  Commonly known as: PRINZIDE;ZESTORETIC     Nifedical XL 30 MG extended release tablet  Generic drug: NIFEdipine               Where to Get Your Medications        These medications were sent to [address], [address] [place] [address] Shannon Ibarra [phone]  [facility] [address]      Hours: 24-hours Phone: [phone]   calcium carbonate 500 MG chewable tablet  furosemide 40 MG tablet  furosemide 40 MG tablet  lisinopril 10 MG tablet  metoprolol succinate 25 MG extended release tablet  miconazole 2 % powder  spironolactone 25 MG tablet         Holland Benjaminum, [address]  Heart Failure Discharge Medication Reconciliation Program  [phone]

## 2022-05-05 NOTE — PROGRESS NOTES
Physical Therapy  Facility/Department: [address] PROGRESSIVE CARE  Physical Therapy Treatment Note    Name: Jess Sanz  : 1933  MRN: [id number]  Date of Service: 2022    Discharge Recommendations:  Continue to assess pending progress,Home with Home health PT   PT Equipment Recommendations  Other: Will monitor for potential equipt needs. Would recommend an Alert System. Diananoopcristal Armasmart Scott scored a 19/ on the AM-PAC short mobility form. Current research shows that an AM-PAC score of 18 or greater is typically associated with a discharge to the patient's home setting. Based on the patient's AM-PAC score and their current functional mobility deficits, it is recommended that the patient have 2-3 sessions per week of Physical Therapy at d/c to increase the patient's independence. At this time, this patient demonstrates the endurance and safety to discharge home with  Home PT Evaluation  and a follow up treatment frequency of 2-3x/wk. Please see assessment section for further patient specific details. If patient discharges prior to next session this note will serve as a discharge summary. Please see below for the latest assessment towards goals. Assessment   Body Structures, Functions, Activity Limitations Requiring Skilled Therapeutic Intervention: Decreased functional mobility ; Decreased endurance;Decreased balance  Assessment: 79 y/o male admit 2022 with CHF, Cellulitis of Perineum, and Scrotal/LE Edema. PMH as noted including AAA, MI, CAD, CABG, Hernia Repair. PTA pt living alone in multi level home; steps to access garage to main level and then stair lift to 2nd floor bed/bath; independent daily care and functional mobility (with cane). Pt has progressed well with PT Rx; mobility is improved abit more with use of Rolling Walker rather than Cane. Pt does report that he will cont to use his cane (walker \"is just more baggage\").   Concerns for fall risk more prevalent given probable need home O2 (O2 tubing); pt ed regarding safe mobility with O2 tubing. At this time, pt reports plan to return home; would recommend Home PT. Will monitor pt's progress. Therapy Prognosis: Good  Decision Making: Medium Complexity  History: 79 y/o male admit 4/30/2022 with CHF, Cellulitis of Perineum, and Scrotal/LE Edema. PMH as noted including AAA, MI, CAD, CABG, Hernia Repair. Exam: See above. Clinical Presentation: See above. Barriers to Learning: [place]. Activity Tolerance  Activity Tolerance: Patient tolerated treatment well  Activity Tolerance Comments: Pt has progressed well with PT Rx; mobility is improved abit more with use of Rolling Walker rather than Cane. Pt does report that he will cont to use his cane (walker \"is just more baggage\"). Concerns for fall risk more prevalent given probable need home O2 (O2 tubing); pt ed regarding safe mobility with O2 tubing. Plan   Plan  Plan: 3-5 times per week  Current Treatment Recommendations: Strengthening,Functional mobility training,Transfer training,Gait training,Stair training,Safety education & training,Patient/Caregiver education & training  Safety Devices  Type of Devices: Call light within reach,Chair alarm in place,Left in chair,Nurse notified     Restrictions  Restrictions/Precautions  Restrictions/Precautions: Fall Risk  Position Activity Restriction  Other position/activity restrictions: O2 2L via NC; desats with attempt amb while room air. Subjective   General  Chart Reviewed: Yes  Patient assessed for rehabilitation services?: Yes  Additional Pertinent Hx: 79 y/o male admit 4/30/2022 with CHF, Cellulitis of Perineum, and Scrotal/LE Edema. PMH as noted including AAA, MI, CAD, CABG, Hernia Repair. Response To Previous Treatment: Patient with no complaints from previous session.   Family / Caregiver Present: No  Referring Practitioner: Dr. Meghana Shipley: Within Functional Limits  Subjective  Subjective: Pt agreeable to PT Rx. During PT Rx today; pt acknowledges mobility \"alittle better\" using walker, will cont to use cane (walker \"is just more baggage\"). Social/Functional History  Social/Functional History  Lives With: Alone  Type of Home: House  Home Layout: Multi-level,Bed/Bath upstairs,1/2 bath on main level,Laundry in basement  Home Access: Stairs to enter with rails,Level entry (Level entry garage to basement. 8-10 steps with handrail to main level. Stairlift from main level to 2nd floor.)  Bathroom Shower/Tub: Tub/Shower unit (pt reports sink bathing due to tub not working)  Bathroom Toilet: Standard  Bathroom Accessibility: Accessible  Home Equipment: [address] Help From: Family (Family occass assist with homemaking needs.)  ADL Assistance: Independent  Homemaking Assistance: Independent (Family occass assists although pt does take care of most of the time.)  Ambulation Assistance: Independent (With [employer]  Transfer Assistance: Independent  Active : Yes  Occupation: Retired  Type of Occupation: Worked at [employer]. Vision/Hearing       Cognition   Orientation  Overall Orientation Status: Within Functional Limits  Cognition  Overall Cognitive Status: WFL     Objective      Observation/Palpation  Observation: Forward/Flexed C-Spine, Kyphotic T-Spine. Bed mobility  Supine to Sit: Independent  Transfers  Sit to Stand: Supervision  Stand to sit: Supervision  Ambulation  Surface: level tile  Distance: Pt amb to/from bathroom, additional 60' x 2 with Cane SBA. Flexed/Kyphotic Posture; foot flat/diminished clearance although no LE buckling/giving way. Additional [de-identified]' x 2 with [facility] only. Improved jessica and generally less guarded. Cues for safe transitional mvts using walker although no specific LOB noted. Balance  Sitting - Static: Good  Sitting - Dynamic: Good  Standing - Static: Good;-  Standing - Dynamic: Fair;+;Good;- (Fair + with Cane. Good- with Walker.)  A/AROM Exercises: Hip Flex, Knee Ext, Toe/Heel Raises 10x2           AM-PAC Score  AM-PAC Inpatient Mobility Raw Score : 19 (05/05/22 1156)  AM-PAC Inpatient T-Scale Score : 45.44 (05/05/22 1156)  Mobility Inpatient CMS 0-100% Score: 41.77 (05/05/22 1156)  Mobility Inpatient CMS G-Code Modifier : CK (05/05/22 1156)          Goals  Short Term Goals  Time Frame for Short term goals: Upon d/c acute care setting. Short term goal 1: Bed Mob Independent. 5/5 Goal met. Short term goal 2: Transfers with assist device Supervision. 5/5 Goal met. Short term goal 3: Amb with assist device 50-75' SBA/Supervision. 5/5 Goal met. Short term goal 4: Stair Negotiation as approp. Patient Goals   Patient goals : Return home.        Therapy Time   Individual Concurrent Group Co-treatment   Time In 0930         Time Out 1015         Minutes [facility]

## 2022-09-09 NOTE — DISCHARGE INSTRUCTIONS
[facility], [address] [place] [address] Road  Telephone: [phone]    Discharge Instructions    Important reminders:    **If you have any signs and symptoms of illness (Cough, fever, congestion, nausea, vomiting, diarrhea, etc.) please call the wound care center prior to your appointment. 1. Increase Protein intake for optimal wound healing  2. No added salt to reduce any swelling  3. If diabetic, maintain good glucose control  4. If you smoke, smoking prohibits wound healing, we ask that you refrain from smoking. 5. When taking antibiotics take the entire prescription as ordered. Do not stop taking until medication is all gone unless otherwise instructed. 6. Exercise as tolerated. 7. Keep weight off wounds and reposition every 2 hours if applicable. 8. If wound(s) is on your lower extremity, elevate legs to the level of the heart or above for 30 minutes 4-5 times a day and/or when sitting. Avoid standing for long periods of time. 9. Do not get wounds wet in bath or shower unless otherwise instructed by your physician. If your wound is on your foot or leg, you may purchase a cast bag. Please ask at the pharmacy. If Vascular testing is ordered, please call [address] [place] [address] [phone]) to schedule. Vascular tests ordered by Wound Care Physicians may take up to 2 hours to complete. Please keep that in mind when scheduling. If Vascular testing is scheduled, please bring supplies to replace your dressing after testing is done. The vascular department does not stock supplies. Wound: Left leg and knee    With each dressing change, rinse wounds with 0.9% Saline. (May use wound wash or soft contact solution. Both can be purchased at a local drug store). If unable to obtain saline, may use a gentle soap and water. Dressing care: Left anterior leg wound- Apply Manuka Honey and dry dressing change daily and as needed.  Betadine to left knee eschar daily.    Home Care Agency/Facility: [facility]      Your wound-care supplies will be provided by:   Please note, depending on your insurance coverage, you may have out-of-pocket expenses for these supplies. Someone from the company should call you to confirm your order and discuss those potential costs before they ship your products -- please anticipate that call. If your out-of-pocket cost could be substantial, Many companies have financial hardship programs for patients who qualify, so please ask about that if you might need a hand. If you have any questions about your supplies or your potential out-of-pocket costs, or if you need to place an order for a refill of supplies (typically monthly), please call the company directly. Your  is Gretta Cox up with Dr Antonino Rogers In 2 week(s) in the wound care center. Wound Care Center Information: Should you experience any significant changes in your wound(s) or have questions about your wound care, please contact the [employer] at [phone] Monday  - Thursday 8:00 am - 4:00 pm and Friday 8:00 am - 1:00pm. If you need help with your wound outside these hours and cannot wait until we are again available, contact your PCP or go to the hospital emergency room. PLEASE NOTE: IF YOU ARE UNABLE TO OBTAIN WOUND SUPPLIES, CONTINUE TO USE THE SUPPLIES YOU HAVE AVAILABLE UNTIL YOU ARE ABLE TO REACH US. IT IS MOST IMPORTANT TO KEEP THE WOUND COVERED AT ALL TIMES. Patient Experience    Thank you for trusting us with your care. You may receive a survey from a company called [employer] asking for your feedback. We would appreciate it if you took a few minutes to share your experience. Your input is very valuable to us.

## 2022-09-13 ENCOUNTER — HOSPITAL ENCOUNTER (OUTPATIENT)
Dept: WOUND CARE | Age: 87
Discharge: HOME | End: 2022-09-13
Payer: MEDICARE

## 2022-09-13 VITALS
TEMPERATURE: 97 F | RESPIRATION RATE: 16 BRPM | SYSTOLIC BLOOD PRESSURE: 133 MMHG | DIASTOLIC BLOOD PRESSURE: 71 MMHG | HEART RATE: 77 BPM

## 2022-09-13 DIAGNOSIS — S80.12XA CONTUSION OF LEFT KNEE AND LOWER LEG, INITIAL ENCOUNTER: ICD-10-CM

## 2022-09-13 DIAGNOSIS — L98.499 DIABETES MELLITUS WITH SKIN ULCER (HCC): ICD-10-CM

## 2022-09-13 DIAGNOSIS — Z74.09 IMPAIRED MOBILITY: ICD-10-CM

## 2022-09-13 DIAGNOSIS — E11.622 DIABETES MELLITUS WITH SKIN ULCER (HCC): ICD-10-CM

## 2022-09-13 DIAGNOSIS — L97.922 NON-PRESSURE CHRONIC ULCER OF LEFT LOWER LEG WITH FAT LAYER EXPOSED (HCC): ICD-10-CM

## 2022-09-13 DIAGNOSIS — S80.02XA CONTUSION OF LEFT KNEE AND LOWER LEG, INITIAL ENCOUNTER: ICD-10-CM

## 2022-09-13 PROCEDURE — 11042 DBRDMT SUBQ TIS 1ST 20SQCM/<: CPT | Performed by: EMERGENCY MEDICINE

## 2022-09-13 PROCEDURE — 11042 DBRDMT SUBQ TIS 1ST 20SQCM/<: CPT

## 2022-09-13 PROCEDURE — 99203 OFFICE O/P NEW LOW 30 MIN: CPT | Performed by: EMERGENCY MEDICINE

## 2022-09-13 PROCEDURE — 99213 OFFICE O/P EST LOW 20 MIN: CPT

## 2022-09-13 RX ORDER — LIDOCAINE 50 MG/G
OINTMENT TOPICAL ONCE
Status: CANCELLED | OUTPATIENT
Start: 2022-09-13 | End: 2022-09-13

## 2022-09-13 RX ORDER — BACITRACIN, NEOMYCIN, POLYMYXIN B 400; 3.5; 5 [USP'U]/G; MG/G; [USP'U]/G
OINTMENT TOPICAL ONCE
Status: CANCELLED | OUTPATIENT
Start: 2022-09-13 | End: 2022-09-13

## 2022-09-13 RX ORDER — BACITRACIN ZINC AND POLYMYXIN B SULFATE 500; 1000 [USP'U]/G; [USP'U]/G
OINTMENT TOPICAL ONCE
Status: CANCELLED | OUTPATIENT
Start: 2022-09-13 | End: 2022-09-13

## 2022-09-13 RX ORDER — GINSENG 100 MG
CAPSULE ORAL ONCE
Status: CANCELLED | OUTPATIENT
Start: 2022-09-13 | End: 2022-09-13

## 2022-09-13 RX ORDER — BETAMETHASONE DIPROPIONATE 0.05 %
OINTMENT (GRAM) TOPICAL ONCE
Status: CANCELLED | OUTPATIENT
Start: 2022-09-13 | End: 2022-09-13

## 2022-09-13 RX ORDER — LIDOCAINE 40 MG/G
CREAM TOPICAL ONCE
Status: CANCELLED | OUTPATIENT
Start: 2022-09-13 | End: 2022-09-13

## 2022-09-13 RX ORDER — LIDOCAINE HYDROCHLORIDE 20 MG/ML
JELLY TOPICAL ONCE
Status: CANCELLED | OUTPATIENT
Start: 2022-09-13 | End: 2022-09-13

## 2022-09-13 RX ORDER — CLOBETASOL PROPIONATE 0.5 MG/G
OINTMENT TOPICAL ONCE
Status: CANCELLED | OUTPATIENT
Start: 2022-09-13 | End: 2022-09-13

## 2022-09-13 RX ORDER — LIDOCAINE HYDROCHLORIDE 40 MG/ML
SOLUTION TOPICAL ONCE
Status: CANCELLED | OUTPATIENT
Start: 2022-09-13 | End: 2022-09-13

## 2022-09-13 RX ORDER — GENTAMICIN SULFATE 1 MG/G
OINTMENT TOPICAL ONCE
Status: CANCELLED | OUTPATIENT
Start: 2022-09-13 | End: 2022-09-13

## 2022-09-13 NOTE — H&P
[facility]   History and Physical Note   Referring Provider: Dr. Eleanor Ga  Reason for Referral: leg wounds    Vonnie Cohn Sr. MEDICAL RECORD NUMBER:  [id number]  AGE: 80 y.o. GENDER: male  : 1933  EPISODE DATE:  2022    Chief complaint and reason for visit:     Chief Complaint   Patient presents with    Wound Check     Left lower leg, injury from fall on 2022         HISTORY of PRESENT ILLNESS HPI     Vonnie Cohn Sr. is a 80 y.o. male who presents today for an initial evaluation of a wound/ulcer. Patient is new to the wound center. Wound duration:  22 . History of Wound Context: 80-year-old male status post mechanical fall sustaining 2 wounds on his left lower extremity. This occurred on 2022. Patient placed a dressing on the ulcers and despite having home health nursing, refused to have dressing changed. Patient presented to his primary care provider on 2022. Treatment included Adaptic. He was referred to the wound care center for ongoing wound care recommendations. He received a tetanus update as well. Pertinent associated symptoms: drainage , redness, swelling, and impaired mobility    PAST MEDICAL HISTORY        Diagnosis Date    Diabetes mellitus ([address]     Hyperlipidemia     Hypertension     MI (myocardial infarction) ([address]        PAST SURGICAL HISTORY  Past Surgical History:   Procedure Laterality Date    CARDIAC SURGERY      CABGx5 vessels    HERNIA REPAIR      x 3       FAMILY HISTORY  History reviewed. No pertinent family history.     SOCIAL HISTORY  Social History     Tobacco Use    Smoking status: Former     Types: Cigarettes     Quit date: 1999     Years since quittin.4    Smokeless tobacco: Never   Vaping Use    Vaping Use: Never used   Substance Use Topics    Alcohol use: No    Drug use: No       ALLERGIES  Allergies   Allergen Reactions    Penicillins Hives       MEDICATIONS  Current Outpatient Medications on File Prior to Encounter   Medication Sig Dispense Refill    metoprolol succinate (TOPROL XL) 25 MG extended release tablet Take 1 tablet by mouth daily 30 tablet 3    spironolactone (ALDACTONE) 25 MG tablet Take 0.5 tablets by mouth daily (Patient taking differently: Take 12.5 mg by mouth 2 times daily) 30 tablet 3    miconazole (MICOTIN) 2 % powder Apply topically 2 times daily. 45 g 1    lisinopril (PRINIVIL;ZESTRIL) 10 MG tablet Take 1 tablet by mouth daily 30 tablet 3    furosemide (LASIX) 40 MG tablet Take 1 tablet by mouth daily 60 tablet 3    linagliptin (TRADJENTA) 5 MG tablet Take 5 mg by mouth daily      metFORMIN ER (GLUCOPHAGE-XR) 750 MG XR tablet Take 1,500 mg by mouth daily (with breakfast) 2 tabs in morning       atorvastatin (LIPITOR) 10 MG tablet Take 10 mg by mouth daily. tamsulosin (FLOMAX) 0.4 MG capsule Take 0.8 mg by mouth daily       aspirin 81 MG EC tablet Take 81 mg by mouth daily. vitamin E 400 UNIT capsule Take 400 Units by mouth daily. niacin 100 MG tablet Take 100 mg by mouth daily (with breakfast). Omega-3 Fatty Acids (FISH OIL) 1000 MG CAPS Take 3,000 mg by mouth 3 times daily. ascorbic acid (VITAMIN C) 500 MG tablet Take 500 mg by mouth daily. No current facility-administered medications on file prior to encounter. REVIEW OF SYSTEMS  A comprehensive review of systems was negative except for: Pertinent items are noted in HPI. Objective:      /71   Pulse 77   Temp 97 °F (36.1 °C) (Infrared)   Resp 16     Wt Readings from Last 3 Encounters:   05/05/22 179 lb 10.8 oz (81.5 kg)   06/26/15 215 lb (97.5 kg)   05/06/15 215 lb (97.5 kg)       PHYSICAL EXAM  General Appearance/Constitutional: alert and oriented to person, place and time,  and in no acute distress. Nontoxic. Skin: warm and dry, no rash, positive wound per LDA documentation if applicable. Head: normocephalic and atraumatic.   Eyes: extraocular eye movements intact, conjunctivae normal, and sclera anicteric. ENT: hearing grossly normal bilaterally. Normal appearance. Pulmonary/Chest: no chest wall tenderness and clear anteriorly. No respiratory distress. Cardiovascular: normal rate and regular rhythm. GI: abdomen soft, non-tender and non-distended. Musculoskeletal: normal range of motion of joints. Nontender calves. No cyanosis. Nontender calves. Edema 2+  Neurologic: no gross cranial nerve deficit and speech normal. No focal deficits. Mental status normal.    Medical Decision Makin-year-old male status post mechanical fall with nonhealing ulcer to left lower extremity and contusion to left knee and left anterior tibial surface  Assessment required other independent historian(s): Yes. Additional Historian: patient  and daughter . Comorbid conditions affecting wound healing: As noted in [facility] and [facility] which was reviewed. Problem List Items Addressed This Visit          Endocrine    Diabetes mellitus with skin ulcer ([address]       Other    Non-pressure chronic ulcer of left lower leg with fat layer exposed ([address]    Impaired mobility    Contusion of left knee and lower leg       Wounds and Treatment Plan:  Nonpressure ulcer left lower extremity, anterior tibial surface. Severity of fat layers exposed. Overlying soft nonviable tissue requiring debridement. Part of the wound that was also hypergranular. Debridement done and silver nitrate used over hypergranular tissue. Other diagnoses or problems addressed:  Diabetes mellitus. Type II. Extensive education and counseling provided. Tight glycemic control stressed along with protein emphasis with meals. Impaired mobility. Recommend using the cane at all times to avoid further injuries in the future. Education provided. Left lower extremity edema. Some mild venous stasis noted. Acute inflammatory skin changes possibly from the recent trauma. Continue to monitor for signs of infection.   No acute infection at this time. Elevation at all times possible. Pertinent labs reviewed. Review of medical records and external note (s) from other providers was done for this visit. New lab or imaging orders placed:  none    Prescription drug management: N/A     Risk of complications and/or mortality of patient management: This patient has a moderate risk of morbidity and mortality from additional diagnostic testing or treatment. This is due to the above conditions affecting wound healing as well as patient and procedure risk factors. Education and discussion held with patient regarding these disease processes pertinent to wound(s). Other pertinent decisions include: minor surgery or procedures as below. The patient's diagnosis or treatment is not significantly limited by social determinants of health as noted by: N/A . Discussion of management or test interpretation with another provider, other qualified health care professional, and other external source. Time spent with patient and patient care issues above the usual time needed for wound assessment and treatment was: [] 15-20 min  [] 21-30 min  [x] 31-44 min  [] 45 min or more  This included time retrieving and reviewing records with patient and education provided to patient regarding disease process(es), offloading or pressure relief, nutrition needed for wound healing, smoking cessation when applicable, and infection risk.     This time also included physician non-face-to-face service time visit on the date of service such as  Preparing to see the patient (eg, review of tests)  Obtaining and/or reviewing separately obtained history  Performing a medically necessary appropriate examination and/or evaluation  Counseling and educating the patient/family/caregiver  Ordering medications, tests, or procedures  Referring and communicating with other health care professionals as needed  Documenting clinical information in the electronic or other health record  Independently interpreting results (not reported separately) and communicating results to the patient/family/caregiver  Care coordination (not reported separately)    Wound 04/30/22 Elbow Distal right elbow skin tear present upon arrival (Active)   Number of days: 136       Wound 09/13/22 Leg Left; Lower; Anterior #1 (Active)   Wound Image   09/13/22 1417   Wound Etiology Traumatic 09/13/22 1417   Wound Cleansed Cleansed with saline 09/13/22 1417   Wound Length (cm) 5.5 cm 09/13/22 1417   Wound Width (cm) 2.5 cm 09/13/22 1417   Wound Depth (cm) 0.1 cm 09/13/22 1417   Wound Surface Area (cm^2) 13.75 cm^2 09/13/22 1417   Wound Volume (cm^3) 1.375 cm^3 09/13/22 1417   Post-Procedure Length (cm) 5.5 cm 09/13/22 1446   Post-Procedure Width (cm) 2.5 cm 09/13/22 1446   Post-Procedure Depth (cm) 0.15 cm 09/13/22 1446   Post-Procedure Surface Area (cm^2) 13.75 cm^2 09/13/22 1446   Post-Procedure Volume (cm^3) 2.0625 cm^3 09/13/22 1446   Wound Assessment Bleeding 09/13/22 1446   Drainage Amount Moderate 09/13/22 1417   Drainage Description Serosanguinous 09/13/22 1417   Odor None 09/13/22 1417   Jacquie-wound Assessment Intact 09/13/22 1417   Margins Defined edges 09/13/22 1417   Number of days: 0     Procedures done this visit:   Procedure Note  Indications:  Based on my examination of this patient's wound(s)/ulcer(s) today, debridement is required to promote healing and evaluate the wound base. Performed by: Jyothi Roach MD  Consent obtained:  Yes  Time out taken:  Yes  Pain Control: Anesthetic  Anesthetic: 4% Lidocaine Cream   Debridement: Excisional Debridement  Using curette the wound(s)/ulcer(s) was/were debrided down through and including the removal of subcutaneous tissue.       Devitalized Tissue Debrided:  fibrin, biofilm, slough, necrotic/eschar, and callus  Pre Debridement Measurements:  Are located in the [place]  Documentation Flow Sheet  Diabetic/Pressure/Non Pressure Ulcers only:  Ulcer: Non-Pressure ulcer, fat layer exposed   Wound/Ulcer #: 1  Post Debridement Measurements:  Wound/Ulcer Descriptions are Pre Debridement except measurements: Total Surface Area Debrided:  13.75 sq cm   Estimated Blood Loss:  Minimal  Hemostasis Achieved:  by pressure  Procedural Pain:  0  / 10   Post Procedural Pain:  0 / 10   Response to treatment:  Well tolerated by patient. Written patient dismissal instructions given to patient and signed by patient or POA. Patient voiced understanding that the importance of adherence to instructions is paramount to wound healing improvement or success.      Electronically signed by Bethanie Lucio MD on 9/13/2022 at 2:52 PM

## 2022-09-15 NOTE — PLAN OF CARE
[place] [address]Be[place] Abdi [facility] [place], [address]  Telephone: [phone]        Other [facility] [phone]          Discharge Instructions           [place] [address]Becca Abdi [place], [address]  Telephone: [phone]    Discharge Instructions    Important reminders:    **If you have any signs and symptoms of illness (Cough, fever, congestion, nausea, vomiting, diarrhea, etc.) please call the wound care center prior to your appointment. 1. Increase Protein intake for optimal wound healing  2. No added salt to reduce any swelling  3. If diabetic, maintain good glucose control  4. If you smoke, smoking prohibits wound healing, we ask that you refrain from smoking. 5. When taking antibiotics take the entire prescription as ordered. Do not stop taking until medication is all gone unless otherwise instructed. 6. Exercise as tolerated. 7. Keep weight off wounds and reposition every 2 hours if applicable. 8. If wound(s) is on your lower extremity, elevate legs to the level of the heart or above for 30 minutes 4-5 times a day and/or when sitting. Avoid standing for long periods of time. 9. Do not get wounds wet in bath or shower unless otherwise instructed by your physician. If your wound is on your foot or leg, you may purchase a cast bag. Please ask at the pharmacy. If Vascular testing is ordered, please call [address] [place] [address] [phone]) to schedule. Vascular tests ordered by Wound Care Physicians may take up to 2 hours to complete. Please keep that in mind when scheduling. If Vascular testing is scheduled, please bring supplies to replace your dressing after testing is done. The vascular department does not stock supplies. Wound: Left leg and knee    With each dressing change, rinse wounds with 0.9% Saline. (May use wound wash or soft contact solution.  Both can be purchased at a local drug store). If unable to obtain saline, may use a gentle soap and water. Dressing care: Left anterior leg wound- Apply Manuka Honey and dry dressing change daily and as needed. Betadine to left knee eschar daily. Home Care Agency/Facility: [facility]      Your wound-care supplies will be provided by:   Please note, depending on your insurance coverage, you may have out-of-pocket expenses for these supplies. Someone from the company should call you to confirm your order and discuss those potential costs before they ship your products -- please anticipate that call. If your out-of-pocket cost could be substantial, Many companies have financial hardship programs for patients who qualify, so please ask about that if you might need a hand. If you have any questions about your supplies or your potential out-of-pocket costs, or if you need to place an order for a refill of supplies (typically monthly), please call the company directly. Your  is Gretta Cox up with Dr Slim Mcbride In 2 week(s) in the wound care center. Wound Care Center Information: Should you experience any significant changes in your wound(s) or have questions about your wound care, please contact the [employer] at [phone] Monday  - Thursday 8:00 am - 4:00 pm and Friday 8:00 am - 1:00pm. If you need help with your wound outside these hours and cannot wait until we are again available, contact your PCP or go to the hospital emergency room. PLEASE NOTE: IF YOU ARE UNABLE TO OBTAIN WOUND SUPPLIES, CONTINUE TO USE THE SUPPLIES YOU HAVE AVAILABLE UNTIL YOU ARE ABLE TO REACH US. IT IS MOST IMPORTANT TO KEEP THE WOUND COVERED AT ALL TIMES. Patient Experience    Thank you for trusting us with your care. You may receive a survey from a company called [employer] asking for your feedback. We would appreciate it if you took a few minutes to share your experience. Your input is very valuable to us. Skilled nurse to evaluate and treat for wound care. Change dressing as ordered  once a day on Monday, Wednesday, Thursday, Friday, Saturday, and Sunday using clean technique. Patient/Family/caregiver may change dressings as needed as instructed when Home Care unavailable. WOUNDS REQUIRING DRESSING Changes:     Wound 04/30/22 Elbow Distal right elbow skin tear present upon arrival (Active)   Number of days: 137       Wound 09/13/22 Leg Left; Lower; Anterior #1 (Active)   Wound Image   09/13/22 1417   Wound Etiology Traumatic 09/13/22 1417   Wound Cleansed Cleansed with saline 09/13/22 1417   Wound Length (cm) 5.5 cm 09/13/22 1417   Wound Width (cm) 2.5 cm 09/13/22 1417   Wound Depth (cm) 0.1 cm 09/13/22 1417   Wound Surface Area (cm^2) 13.75 cm^2 09/13/22 1417   Wound Volume (cm^3) 1.375 cm^3 09/13/22 1417   Post-Procedure Length (cm) 5.5 cm 09/13/22 1446   Post-Procedure Width (cm) 2.5 cm 09/13/22 1446   Post-Procedure Depth (cm) 0.15 cm 09/13/22 1446   Post-Procedure Surface Area (cm^2) 13.75 cm^2 09/13/22 1446   Post-Procedure Volume (cm^3) 2.0625 cm^3 09/13/22 1446   Wound Assessment Bleeding 09/13/22 1446   Drainage Amount Moderate 09/13/22 1417   Drainage Description Serosanguinous 09/13/22 1417   Odor None 09/13/22 1417   Jacquie-wound Assessment Intact 09/13/22 1417   Margins Defined edges 09/13/22 1417   Number of days: 1          Patient seen and treated on 09/13/22    By Dr Dat Montesinos, NPI: [id number] (provider/NPI)

## 2022-09-15 NOTE — PLAN OF CARE
Discharge instructions given on 9/13/22. Patient verbalized understanding. Return to [facility] in 1 week(s). Obtained home heathcare info from family. Called/faxed [facility].

## 2022-09-26 NOTE — DISCHARGE INSTRUCTIONS
[address], [address] [place] [address] Road  Telephone: [phone]     Discharge Instructions     Important reminders:     **If you have any signs and symptoms of illness (Cough, fever, congestion, nausea, vomiting, diarrhea, etc.) please call the wound care center prior to your appointment. 1. Increase Protein intake for optimal wound healing  2. No added salt to reduce any swelling  3. If diabetic, maintain good glucose control  4. If you smoke, smoking prohibits wound healing, we ask that you refrain from smoking. 5. When taking antibiotics take the entire prescription as ordered. Do not stop taking until medication is all gone unless otherwise instructed. 6. Exercise as tolerated. 7. Keep weight off wounds and reposition every 2 hours if applicable. 8. If wound(s) is on your lower extremity, elevate legs to the level of the heart or above for 30 minutes 4-5 times a day and/or when sitting. Avoid standing for long periods of time. 9. Do not get wounds wet in bath or shower unless otherwise instructed by your physician. If your wound is on your foot or leg, you may purchase a cast bag. Please ask at the pharmacy. If Vascular testing is ordered, please call [address] [place] [address] [phone]) to schedule. Vascular tests ordered by Wound Care Physicians may take up to 2 hours to complete. Please keep that in mind when scheduling. If Vascular testing is scheduled, please bring supplies to replace your dressing after testing is done. The vascular department does not stock supplies. Wound: Left leg and knee     With each dressing change, rinse wounds with 0.9% Saline. (May use wound wash or soft contact solution. Both can be purchased at a local drug store). If unable to obtain saline, may use a gentle soap and water. Dressing care: Left anterior leg wound- Apply Manuka Honey and dry dressing change daily and as needed.  Betadine to left knee eschar daily. Home Care Agency/Facility: [facility]        Your wound-care supplies will be provided by:   Please note, depending on your insurance coverage, you may have out-of-pocket expenses for these supplies. Someone from the company should call you to confirm your order and discuss those potential costs before they ship your products -- please anticipate that call. If your out-of-pocket cost could be substantial, Many companies have financial hardship programs for patients who qualify, so please ask about that if you might need a hand. If you have any questions about your supplies or your potential out-of-pocket costs, or if you need to place an order for a refill of supplies (typically monthly), please call the company directly. Your  is Gretta Cox up with Dr Francisco Hernandez In 2 week(s) in the wound care center. Wound Care Center Information: Should you experience any significant changes in your wound(s) or have questions about your wound care, please contact the [employer] at [phone] Monday  - Thursday 8:00 am - 4:00 pm and Friday 8:00 am - 1:00pm. If you need help with your wound outside these hours and cannot wait until we are again available, contact your PCP or go to the hospital emergency room. PLEASE NOTE: IF YOU ARE UNABLE TO OBTAIN WOUND SUPPLIES, CONTINUE TO USE THE SUPPLIES YOU HAVE AVAILABLE UNTIL YOU ARE ABLE TO REACH US. IT IS MOST IMPORTANT TO KEEP THE WOUND COVERED AT ALL TIMES. Patient Experience     Thank you for trusting us with your care. You may receive a survey from a company called [employer] asking for your feedback. We would appreciate it if you took a few minutes to share your experience. Your input is very valuable to us.

## 2022-09-27 ENCOUNTER — HOSPITAL ENCOUNTER (OUTPATIENT)
Dept: WOUND CARE | Age: 87
Discharge: HOME | End: 2022-09-27
Payer: MEDICARE

## 2022-09-27 VITALS
TEMPERATURE: 97.3 F | DIASTOLIC BLOOD PRESSURE: 56 MMHG | HEART RATE: 65 BPM | RESPIRATION RATE: 16 BRPM | SYSTOLIC BLOOD PRESSURE: 120 MMHG

## 2022-09-27 DIAGNOSIS — E11.622 DIABETES MELLITUS WITH SKIN ULCER (HCC): Primary | ICD-10-CM

## 2022-09-27 DIAGNOSIS — L98.499 DIABETES MELLITUS WITH SKIN ULCER (HCC): Primary | ICD-10-CM

## 2022-09-27 DIAGNOSIS — S80.02XD CONTUSION OF LEFT KNEE AND LOWER LEG, SUBSEQUENT ENCOUNTER: ICD-10-CM

## 2022-09-27 DIAGNOSIS — S80.12XD CONTUSION OF LEFT KNEE AND LOWER LEG, SUBSEQUENT ENCOUNTER: ICD-10-CM

## 2022-09-27 DIAGNOSIS — S80.02XA CONTUSION OF LEFT KNEE AND LOWER LEG, INITIAL ENCOUNTER: ICD-10-CM

## 2022-09-27 DIAGNOSIS — Z74.09 IMPAIRED MOBILITY: ICD-10-CM

## 2022-09-27 DIAGNOSIS — L97.922 NON-PRESSURE CHRONIC ULCER OF LEFT LOWER LEG WITH FAT LAYER EXPOSED (HCC): ICD-10-CM

## 2022-09-27 DIAGNOSIS — S80.12XA CONTUSION OF LEFT KNEE AND LOWER LEG, INITIAL ENCOUNTER: ICD-10-CM

## 2022-09-27 PROCEDURE — 11042 DBRDMT SUBQ TIS 1ST 20SQCM/<: CPT | Performed by: EMERGENCY MEDICINE

## 2022-09-27 PROCEDURE — 11042 DBRDMT SUBQ TIS 1ST 20SQCM/<: CPT

## 2022-09-27 RX ORDER — LIDOCAINE 50 MG/G
OINTMENT TOPICAL ONCE
OUTPATIENT
Start: 2022-09-27 | End: 2022-09-27

## 2022-09-27 RX ORDER — BETAMETHASONE DIPROPIONATE 0.05 %
OINTMENT (GRAM) TOPICAL ONCE
OUTPATIENT
Start: 2022-09-27 | End: 2022-09-27

## 2022-09-27 RX ORDER — CLOBETASOL PROPIONATE 0.5 MG/G
OINTMENT TOPICAL ONCE
OUTPATIENT
Start: 2022-09-27 | End: 2022-09-27

## 2022-09-27 RX ORDER — LIDOCAINE 40 MG/G
CREAM TOPICAL ONCE
Status: DISCONTINUED | OUTPATIENT
Start: 2022-09-27 | End: 2022-09-28 | Stop reason: HOSPADM

## 2022-09-27 RX ORDER — BACITRACIN ZINC AND POLYMYXIN B SULFATE 500; 1000 [USP'U]/G; [USP'U]/G
OINTMENT TOPICAL ONCE
OUTPATIENT
Start: 2022-09-27 | End: 2022-09-27

## 2022-09-27 RX ORDER — GENTAMICIN SULFATE 1 MG/G
OINTMENT TOPICAL ONCE
OUTPATIENT
Start: 2022-09-27 | End: 2022-09-27

## 2022-09-27 RX ORDER — LIDOCAINE 40 MG/G
CREAM TOPICAL ONCE
Status: CANCELLED | OUTPATIENT
Start: 2022-09-27 | End: 2022-09-27

## 2022-09-27 RX ORDER — BACITRACIN, NEOMYCIN, POLYMYXIN B 400; 3.5; 5 [USP'U]/G; MG/G; [USP'U]/G
OINTMENT TOPICAL ONCE
OUTPATIENT
Start: 2022-09-27 | End: 2022-09-27

## 2022-09-27 RX ORDER — GINSENG 100 MG
CAPSULE ORAL ONCE
OUTPATIENT
Start: 2022-09-27 | End: 2022-09-27

## 2022-09-27 RX ORDER — LIDOCAINE HYDROCHLORIDE 40 MG/ML
SOLUTION TOPICAL ONCE
OUTPATIENT
Start: 2022-09-27 | End: 2022-09-27

## 2022-09-27 RX ORDER — LIDOCAINE HYDROCHLORIDE 20 MG/ML
JELLY TOPICAL ONCE
OUTPATIENT
Start: 2022-09-27 | End: 2022-09-27

## 2022-09-27 NOTE — H&P
[facility]   Progress Note   Referring Provider: Dr. Carrol Marie  Reason for Referral: leg wounds    Richardson Paulson Sr. MEDICAL RECORD NUMBER:  [id number]  AGE: 80 y.o. GENDER: male  : 1933  EPISODE DATE:  2022    Chief complaint and reason for visit:     Chief Complaint   Patient presents with    Wound Check     Left lower leg         HISTORY of PRESENT ILLNESS 22   Richardson Paulson Sr. is a 80 y.o. male who presents today for an initial evaluation of a wound/ulcer. Patient is new to the wound center. Wound duration:  22 . History of Wound Context: 72-year-old male status post mechanical fall sustaining 2 wounds on his left lower extremity. This occurred on 2022. Patient placed a dressing on the ulcers and despite having home health nursing, refused to have dressing changed. Patient presented to his primary care provider on 2022. Treatment included Adaptic. He was referred to the wound care center for ongoing wound care recommendations. He received a tetanus update as well. Pertinent associated symptoms: drainage , redness, swelling, and impaired mobility    PAST MEDICAL HISTORY        Diagnosis Date    Diabetes mellitus ([address]     Hyperlipidemia     Hypertension     MI (myocardial infarction) ([address]        PAST SURGICAL HISTORY  Past Surgical History:   Procedure Laterality Date    CARDIAC SURGERY      CABGx5 vessels    HERNIA REPAIR      x 3       FAMILY HISTORY  History reviewed. No pertinent family history.     SOCIAL HISTORY  Social History     Tobacco Use    Smoking status: Former     Types: Cigarettes     Quit date: 1999     Years since quittin.4    Smokeless tobacco: Never   Vaping Use    Vaping Use: Never used   Substance Use Topics    Alcohol use: No    Drug use: No       ALLERGIES  Allergies   Allergen Reactions    Penicillins Hives       MEDICATIONS  Current Outpatient Medications on File Prior to Encounter Medication Sig Dispense Refill    metoprolol succinate (TOPROL XL) 25 MG extended release tablet Take 1 tablet by mouth daily 30 tablet 3    spironolactone (ALDACTONE) 25 MG tablet Take 0.5 tablets by mouth daily (Patient taking differently: Take 12.5 mg by mouth 2 times daily) 30 tablet 3    miconazole (MICOTIN) 2 % powder Apply topically 2 times daily. 45 g 1    lisinopril (PRINIVIL;ZESTRIL) 10 MG tablet Take 1 tablet by mouth daily 30 tablet 3    furosemide (LASIX) 40 MG tablet Take 1 tablet by mouth daily 60 tablet 3    linagliptin (TRADJENTA) 5 MG tablet Take 5 mg by mouth daily      metFORMIN ER (GLUCOPHAGE-XR) 750 MG XR tablet Take 1,500 mg by mouth daily (with breakfast) 2 tabs in morning       atorvastatin (LIPITOR) 10 MG tablet Take 10 mg by mouth daily. tamsulosin (FLOMAX) 0.4 MG capsule Take 0.8 mg by mouth daily       aspirin 81 MG EC tablet Take 81 mg by mouth daily. vitamin E 400 UNIT capsule Take 400 Units by mouth daily. niacin 100 MG tablet Take 100 mg by mouth daily (with breakfast). Omega-3 Fatty Acids (FISH OIL) 1000 MG CAPS Take 3,000 mg by mouth 3 times daily. ascorbic acid (VITAMIN C) 500 MG tablet Take 500 mg by mouth daily. No current facility-administered medications on file prior to encounter. REVIEW OF SYSTEMS  A comprehensive review of systems was negative except for: Pertinent items are noted in HPI. Objective:      BP (!) 120/56   Pulse 65   Temp 97.3 °F (36.3 °C) (Infrared)   Resp 16     Wt Readings from Last 3 Encounters:   05/05/22 179 lb 10.8 oz (81.5 kg)   06/26/15 215 lb (97.5 kg)   05/06/15 215 lb (97.5 kg)       PHYSICAL EXAM  General Appearance/Constitutional: alert and oriented to person, place and time,  and in no acute distress. Nontoxic. Skin: warm and dry, no rash, positive wound per LDA documentation if applicable. Head: normocephalic and atraumatic.   Eyes: extraocular eye movements intact, conjunctivae normal, and sclera anicteric. ENT: hearing grossly normal bilaterally. Normal appearance. Pulmonary/Chest: no chest wall tenderness and clear anteriorly. No respiratory distress. Cardiovascular: normal rate and regular rhythm. GI: abdomen soft, non-tender and non-distended. Musculoskeletal: normal range of motion of joints. Nontender calves. No cyanosis. Nontender calves. Edema 2+  Neurologic: no gross cranial nerve deficit and speech normal. No focal deficits. Mental status normal.    Medical Decision Makin-year-old male status post mechanical fall with nonhealing ulcer to left lower extremity and contusion to left knee and left anterior tibial surface  Assessment required other independent historian(s): Yes. Additional Historian: patient  and daughter . Comorbid conditions affecting wound healing: As noted in [facility] which was reviewed. Problem List Items Addressed This Visit          Endocrine    Diabetes mellitus with skin ulcer ([facility] - Primary    Relevant Medications    lidocaine (LMX) 4 % cream    Other Relevant Orders    Initiate Outpatient Wound Care Protocol       Other    Non-pressure chronic ulcer of left lower leg with fat layer exposed ([facility]    Relevant Medications    lidocaine (LMX) 4 % cream    Other Relevant Orders    Initiate Outpatient Wound Care Protocol    Impaired mobility    Relevant Medications    lidocaine (LMX) 4 % cream    Other Relevant Orders    Initiate Outpatient Wound Care Protocol    Contusion of left knee and lower leg    Relevant Medications    lidocaine (LMX) 4 % cream    Other Relevant Orders    Initiate Outpatient Wound Care Protocol       Wounds and Treatment Plan:  Nonpressure ulcer left lower extremity, anterior tibial surface. Severity of fat layers exposed. Overlying soft nonviable tissue requiring debridement. Debridement done. Continue with medihoney    Other diagnoses or problems addressed:  Diabetes mellitus. Type II. Extensive education and counseling provided. Tight glycemic control stressed along with protein emphasis with meals. Impaired mobility. Recommend using the cane at all times to avoid further injuries in the future. Education provided. Left lower extremity edema. Some mild venous stasis noted. Acute inflammatory skin changes possibly from the recent trauma. Continue to monitor for signs of infection. No acute infection at this time. Elevation at all times possible. Pertinent labs reviewed. Review of medical records and external note (s) from other providers was done for this visit. New lab or imaging orders placed:  none    Prescription drug management: N/A     Risk of complications and/or mortality of patient management: This patient has a moderate risk of morbidity and mortality from additional diagnostic testing or treatment. This is due to the above conditions affecting wound healing as well as patient and procedure risk factors. Education and discussion held with patient regarding these disease processes pertinent to wound(s). Other pertinent decisions include: minor surgery or procedures as below. The patient's diagnosis or treatment is not significantly limited by social determinants of health as noted by: N/A . Discussion of management or test interpretation with another provider, other qualified health care professional, and other external source. Time spent with patient and patient care issues above the usual time needed for wound assessment and treatment was: [] 15-20 min  [] 21-30 min  [] 31-44 min  [] 45 min or more  This included time retrieving and reviewing records with patient and education provided to patient regarding disease process(es), offloading or pressure relief, nutrition needed for wound healing, smoking cessation when applicable, and infection risk.     This time also included physician non-face-to-face service time visit on the date of service such as  Preparing to see the patient (eg, review of tests)  Obtaining and/or reviewing separately obtained history  Performing a medically necessary appropriate examination and/or evaluation  Counseling and educating the patient/family/caregiver  Ordering medications, tests, or procedures  Referring and communicating with other health care professionals as needed  Documenting clinical information in the electronic or other health record  Independently interpreting results (not reported separately) and communicating results to the patient/family/caregiver  Care coordination (not reported separately)    Wound 04/30/22 Elbow Distal right elbow skin tear present upon arrival (Active)   Number of days: 150       Wound 09/13/22 Leg Left; Lower; Anterior #1 (Active)   Wound Image   09/13/22 1417   Wound Etiology Traumatic 09/27/22 1352   Wound Cleansed Cleansed with saline 09/27/22 1352   Wound Length (cm) 4 cm 09/27/22 1352   Wound Width (cm) 1.4 cm 09/27/22 1352   Wound Depth (cm) 0.1 cm 09/27/22 1352   Wound Surface Area (cm^2) 5.6 cm^2 09/27/22 1352   Change in Wound Size % (l*w) 59.27 09/27/22 1352   Wound Volume (cm^3) 0.56 cm^3 09/27/22 1352   Wound Healing % 59 09/27/22 1352   Post-Procedure Length (cm) 5.5 cm 09/13/22 1446   Post-Procedure Width (cm) 2.5 cm 09/13/22 1446   Post-Procedure Depth (cm) 0.15 cm 09/13/22 1446   Post-Procedure Surface Area (cm^2) 13.75 cm^2 09/13/22 1446   Post-Procedure Volume (cm^3) 2.0625 cm^3 09/13/22 1446   Wound Assessment Slough;Pink/red 09/27/22 1352   Drainage Amount Moderate 09/27/22 1352   Drainage Description Serosanguinous 09/27/22 1352   Odor None 09/27/22 1352   Jacquie-wound Assessment Intact 09/27/22 1352   Margins Defined edges 09/27/22 1352   Number of days: 14     Procedures done this visit:   Procedure Note  Indications:  Based on my examination of this patient's wound(s)/ulcer(s) today, debridement is required to promote healing and evaluate the wound base.   Performed by: Jyothi Roach MD  Consent obtained:  Yes  Time out taken: Yes  Pain Control: Anesthetic  Anesthetic: 4% Lidocaine Cream   Debridement: Excisional Debridement  Using curette the wound(s)/ulcer(s) was/were debrided down through and including the removal of subcutaneous tissue. Devitalized Tissue Debrided:  fibrin, biofilm, slough, necrotic/eschar, and callus  Pre Debridement Measurements:  Are located in the [facility]  Documentation Flow Sheet  Diabetic/Pressure/Non Pressure Ulcers only:  Ulcer: Non-Pressure ulcer, fat layer exposed   Wound/Ulcer #: 1  Post Debridement Measurements:  Wound/Ulcer Descriptions are Pre Debridement except measurements: Total Surface Area Debrided:  5.6 sq cm   Estimated Blood Loss:  Minimal  Hemostasis Achieved:  by pressure  Procedural Pain:  0  / 10   Post Procedural Pain:  0 / 10   Response to treatment:  Well tolerated by patient. Written patient dismissal instructions given to patient and signed by patient or POA. Patient voiced understanding that the importance of adherence to instructions is paramount to wound healing improvement or success.      Electronically signed by Nestor Stringer MD on 9/27/2022 at 2:37 PM

## 2022-09-27 NOTE — PLAN OF CARE
Discharge instructions given. Patient verbalized understanding. Return to [facility] in 2 week(s).

## 2022-09-28 NOTE — PROGRESS NOTES
[address], [address] [place] [address] Road  Telephone: [phone]        [facility] Fax # [phone]        Discharge Instructions         [place] [id number] [facility], [address] [place] [address] Road  Telephone: [phone]     Discharge Instructions     Important reminders:     **If you have any signs and symptoms of illness (Cough, fever, congestion, nausea, vomiting, diarrhea, etc.) please call the wound care center prior to your appointment. 1. Increase Protein intake for optimal wound healing  2. No added salt to reduce any swelling  3. If diabetic, maintain good glucose control  4. If you smoke, smoking prohibits wound healing, we ask that you refrain from smoking. 5. When taking antibiotics take the entire prescription as ordered. Do not stop taking until medication is all gone unless otherwise instructed. 6. Exercise as tolerated. 7. Keep weight off wounds and reposition every 2 hours if applicable. 8. If wound(s) is on your lower extremity, elevate legs to the level of the heart or above for 30 minutes 4-5 times a day and/or when sitting. Avoid standing for long periods of time. 9. Do not get wounds wet in bath or shower unless otherwise instructed by your physician. If your wound is on your foot or leg, you may purchase a cast bag. Please ask at the pharmacy. If Vascular testing is ordered, [facility] ([phone][facility] to schedule. Vascular tests ordered by Wound Care Physicians may take up to 2 hours to complete. Please keep that in mind when scheduling. If Vascular testing is scheduled, please bring supplies to replace your dressing after testing is done. The vascular department does not stock supplies. Wound: Left leg and knee     With each dressing change, rinse wounds with 0.9% Saline. (May use wound wash or soft contact solution.  Both can be purchased at a local drug store). If unable to obtain saline, may use a gentle soap and water. Dressing care: Left anterior leg wound- Apply Manuka Honey and dry dressing change daily and as needed. Betadine to left knee eschar daily. Home Care Agency/Facility: [facility])        Your wound-care supplies will be provided by:   Please note, depending on your insurance coverage, you may have out-of-pocket expenses for these supplies. Someone from the company should call you to confirm your order and discuss those potential costs before they ship your products -- please anticipate that call. If your out-of-pocket cost could be substantial, Many companies have financial hardship programs for patients who qualify, so please ask about that if you might need a hand. If you have any questions about your supplies or your potential out-of-pocket costs, or if you need to place an order for a refill of supplies (typically monthly), please call the company directly. Your  is Gretta Cox up with Dr Mike Farley In 2 week(s) in the wound care center. Wound Care Center Information: Should you experience any significant changes in your wound(s) or have questions about your wound care, please contact the [facility] at [phone] Monday  - Thursday 8:00 am - 4:00 pm and Friday 8:00 am - 1:00pm. If you need help with your wound outside these hours and cannot wait until we are again available, contact your PCP or go to the hospital emergency room. PLEASE NOTE: IF YOU ARE UNABLE TO OBTAIN WOUND SUPPLIES, CONTINUE TO USE THE SUPPLIES YOU HAVE AVAILABLE UNTIL YOU ARE ABLE TO REACH US. IT IS MOST IMPORTANT TO KEEP THE WOUND COVERED AT ALL TIMES. Patient Experience     Thank you for trusting us with your care. You may receive a survey from a company called [employer] asking for your feedback. We would appreciate it if you took a few minutes to share your experience.   Your input is very valuable to us. Skilled nurse to evaluate and treat for wound care. Change dressing as ordered  once a day on Monday, Tuesday, Wednesday, Thursday, Friday, Saturday, and Sunday using clean technique. Patient/Family/caregiver may change dressings as needed as instructed when Home Care unavailable. WOUNDS REQUIRING DRESSING Changes:     Wound 04/30/22 Elbow Distal right elbow skin tear present upon arrival (Active)   Number of days: 151       Wound 09/13/22 Leg Left; Lower; Anterior #1 (Active)   Wound Image   09/13/22 1417   Wound Etiology Traumatic 09/27/22 1352   Wound Cleansed Cleansed with saline 09/27/22 1352   Wound Length (cm) 4 cm 09/27/22 1352   Wound Width (cm) 1.4 cm 09/27/22 1352   Wound Depth (cm) 0.1 cm 09/27/22 1352   Wound Surface Area (cm^2) 5.6 cm^2 09/27/22 1352   Change in Wound Size % (l*w) 59.27 09/27/22 1352   Wound Volume (cm^3) 0.56 cm^3 09/27/22 1352   Wound Healing % 59 09/27/22 1352   Post-Procedure Length (cm) 5.5 cm 09/13/22 1446   Post-Procedure Width (cm) 2.5 cm 09/13/22 1446   Post-Procedure Depth (cm) 0.15 cm 09/13/22 1446   Post-Procedure Surface Area (cm^2) 13.75 cm^2 09/13/22 1446   Post-Procedure Volume (cm^3) 2.0625 cm^3 09/13/22 1446   Wound Assessment Slough;Pink/red 09/27/22 1352   Drainage Amount Moderate 09/27/22 1352   Drainage Description Serosanguinous 09/27/22 1352   Odor None 09/27/22 1352   Jacquie-wound Assessment Intact 09/27/22 1352   Margins Defined edges 09/27/22 1352   Number of days: 14          Patient seen and treated on 9/28/2022    By Dr Andrew Dasilva, NPI: [id number]   (provider/NPI)

## 2022-10-10 NOTE — DISCHARGE INSTRUCTIONS
[facility], [address] [place] [address] Road  Telephone: [phone]     Discharge Instructions     Important reminders:     **If you have any signs and symptoms of illness (Cough, fever, congestion, nausea, vomiting, diarrhea, etc.) please call the wound care center prior to your appointment. 1. Increase Protein intake for optimal wound healing  2. No added salt to reduce any swelling  3. If diabetic, maintain good glucose control  4. If you smoke, smoking prohibits wound healing, we ask that you refrain from smoking. 5. When taking antibiotics take the entire prescription as ordered. Do not stop taking until medication is all gone unless otherwise instructed. 6. Exercise as tolerated. 7. Keep weight off wounds and reposition every 2 hours if applicable. 8. If wound(s) is on your lower extremity, elevate legs to the level of the heart or above for 30 minutes 4-5 times a day and/or when sitting. Avoid standing for long periods of time. 9. Do not get wounds wet in bath or shower unless otherwise instructed by your physician. If your wound is on your foot or leg, you may purchase a cast bag. Please ask at the pharmacy. If Vascular testing is ordered, please call [address] [place] [address] [phone]) to schedule. Vascular tests ordered by Wound Care Physicians may take up to 2 hours to complete. Please keep that in mind when scheduling. If Vascular testing is scheduled, please bring supplies to replace your dressing after testing is done. The vascular department does not stock supplies. Wound: Left leg and knee     With each dressing change, rinse wounds with 0.9% Saline. (May use wound wash or soft contact solution. Both can be purchased at a local drug store). If unable to obtain saline, may use a gentle soap and water. Dressing care: Left anterior leg wound- Apply Hydrofera blue and dry dressing change daily and as needed (m-w-f).      Home Care Agency/Facility: [facility]([place])        Your wound-care supplies will be provided by:   Please note, depending on your insurance coverage, you may have out-of-pocket expenses for these supplies. Someone from the company should call you to confirm your order and discuss those potential costs before they ship your products -- please anticipate that call. If your out-of-pocket cost could be substantial, Many companies have financial hardship programs for patients who qualify, so please ask about that if you might need a hand. If you have any questions about your supplies or your potential out-of-pocket costs, or if you need to place an order for a refill of supplies (typically monthly), please call the company directly. Your  is Gretta Cox up with Dr Melissa Lazaro In 4 week(s) in the wound care center. Wound Care Center Information: Should you experience any significant changes in your wound(s) or have questions about your wound care, please contact the [facility] at [phone] Monday  - Thursday 8:00 am - 4:00 pm and Friday 8:00 am - 1:00pm. If you need help with your wound outside these hours and cannot wait until we are again available, contact your PCP or go to the hospital emergency room. PLEASE NOTE: IF YOU ARE UNABLE TO OBTAIN WOUND SUPPLIES, CONTINUE TO USE THE SUPPLIES YOU HAVE AVAILABLE UNTIL YOU ARE ABLE TO REACH US. IT IS MOST IMPORTANT TO KEEP THE WOUND COVERED AT ALL TIMES. Patient Experience     Thank you for trusting us with your care. You may receive a survey from a company called [employer] asking for your feedback. We would appreciate it if you took a few minutes to share your experience. Your input is very valuable to us.

## 2022-10-11 ENCOUNTER — HOSPITAL ENCOUNTER (OUTPATIENT)
Dept: WOUND CARE | Age: 87
Discharge: HOME | End: 2022-10-11
Payer: MEDICARE

## 2022-10-11 DIAGNOSIS — S80.02XD CONTUSION OF LEFT KNEE AND LOWER LEG, SUBSEQUENT ENCOUNTER: ICD-10-CM

## 2022-10-11 DIAGNOSIS — S80.02XA CONTUSION OF LEFT KNEE AND LOWER LEG, INITIAL ENCOUNTER: ICD-10-CM

## 2022-10-11 DIAGNOSIS — L98.499 DIABETES MELLITUS WITH SKIN ULCER (HCC): Primary | ICD-10-CM

## 2022-10-11 DIAGNOSIS — E11.622 DIABETES MELLITUS WITH SKIN ULCER (HCC): Primary | ICD-10-CM

## 2022-10-11 DIAGNOSIS — S80.12XD CONTUSION OF LEFT KNEE AND LOWER LEG, SUBSEQUENT ENCOUNTER: ICD-10-CM

## 2022-10-11 DIAGNOSIS — L97.922 NON-PRESSURE CHRONIC ULCER OF LEFT LOWER LEG WITH FAT LAYER EXPOSED (HCC): ICD-10-CM

## 2022-10-11 DIAGNOSIS — Z74.09 IMPAIRED MOBILITY: ICD-10-CM

## 2022-10-11 DIAGNOSIS — S80.12XA CONTUSION OF LEFT KNEE AND LOWER LEG, INITIAL ENCOUNTER: ICD-10-CM

## 2022-10-11 PROCEDURE — 11042 DBRDMT SUBQ TIS 1ST 20SQCM/<: CPT | Performed by: EMERGENCY MEDICINE

## 2022-10-11 PROCEDURE — 11042 DBRDMT SUBQ TIS 1ST 20SQCM/<: CPT

## 2022-10-11 RX ORDER — CLOBETASOL PROPIONATE 0.5 MG/G
OINTMENT TOPICAL ONCE
OUTPATIENT
Start: 2022-10-11 | End: 2022-10-11

## 2022-10-11 RX ORDER — LIDOCAINE 50 MG/G
OINTMENT TOPICAL ONCE
OUTPATIENT
Start: 2022-10-11 | End: 2022-10-11

## 2022-10-11 RX ORDER — GENTAMICIN SULFATE 1 MG/G
OINTMENT TOPICAL ONCE
OUTPATIENT
Start: 2022-10-11 | End: 2022-10-11

## 2022-10-11 RX ORDER — LIDOCAINE HYDROCHLORIDE 40 MG/ML
SOLUTION TOPICAL ONCE
OUTPATIENT
Start: 2022-10-11 | End: 2022-10-11

## 2022-10-11 RX ORDER — LIDOCAINE 40 MG/G
CREAM TOPICAL ONCE
OUTPATIENT
Start: 2022-10-11 | End: 2022-10-11

## 2022-10-11 RX ORDER — BACITRACIN ZINC AND POLYMYXIN B SULFATE 500; 1000 [USP'U]/G; [USP'U]/G
OINTMENT TOPICAL ONCE
OUTPATIENT
Start: 2022-10-11 | End: 2022-10-11

## 2022-10-11 RX ORDER — LIDOCAINE HYDROCHLORIDE 20 MG/ML
JELLY TOPICAL ONCE
OUTPATIENT
Start: 2022-10-11 | End: 2022-10-11

## 2022-10-11 RX ORDER — BETAMETHASONE DIPROPIONATE 0.05 %
OINTMENT (GRAM) TOPICAL ONCE
OUTPATIENT
Start: 2022-10-11 | End: 2022-10-11

## 2022-10-11 RX ORDER — GINSENG 100 MG
CAPSULE ORAL ONCE
OUTPATIENT
Start: 2022-10-11 | End: 2022-10-11

## 2022-10-11 RX ORDER — LIDOCAINE 40 MG/G
CREAM TOPICAL ONCE
Status: DISCONTINUED | OUTPATIENT
Start: 2022-10-11 | End: 2022-10-12 | Stop reason: HOSPADM

## 2022-10-11 RX ORDER — BACITRACIN, NEOMYCIN, POLYMYXIN B 400; 3.5; 5 [USP'U]/G; MG/G; [USP'U]/G
OINTMENT TOPICAL ONCE
OUTPATIENT
Start: 2022-10-11 | End: 2022-10-11

## 2022-10-11 NOTE — H&P
[facility]   Progress Note   Referring Provider: Dr. Radha Odonnell  Reason for Referral: leg wounds    Ronnell Sanchez Sr. MEDICAL RECORD NUMBER:  [id number]  AGE: 80 y.o. GENDER: male  : 1933  EPISODE DATE:  10/11/2022    Chief complaint and reason for visit:     Chief Complaint   Patient presents with    Wound Check     Left lower leg         HISTORY of PRESENT ILLNESS 22   Ronnell Sanchez Sr. is a 80 y.o. male who presents today for an initial evaluation of a wound/ulcer. Patient is new to the wound center. Wound duration:  22 . History of Wound Context: 49-year-old male status post mechanical fall sustaining 2 wounds on his left lower extremity. This occurred on 2022. Patient placed a dressing on the ulcers and despite having home health nursing, refused to have dressing changed. Patient presented to his primary care provider on 2022. Treatment included Adaptic. He was referred to the wound care center for ongoing wound care recommendations. He received a tetanus update as well. Pertinent associated symptoms: drainage , redness, swelling, and impaired mobility    10/11/22: no new issues but feels it is almost healed    PAST MEDICAL HISTORY        Diagnosis Date    Diabetes mellitus ([address]     Hyperlipidemia     Hypertension     MI (myocardial infarction) ([address]        PAST SURGICAL HISTORY  Past Surgical History:   Procedure Laterality Date    CARDIAC SURGERY      CABGx5 vessels    HERNIA REPAIR      x 3       FAMILY HISTORY  History reviewed. No pertinent family history.     SOCIAL HISTORY  Social History     Tobacco Use    Smoking status: Former     Types: Cigarettes     Quit date: 1999     Years since quittin.5    Smokeless tobacco: Never   Vaping Use    Vaping Use: Never used   Substance Use Topics    Alcohol use: No    Drug use: No       ALLERGIES  Allergies   Allergen Reactions    Penicillins Hives MEDICATIONS  Current Outpatient Medications on File Prior to Encounter   Medication Sig Dispense Refill    metoprolol succinate (TOPROL XL) 25 MG extended release tablet Take 1 tablet by mouth daily 30 tablet 3    spironolactone (ALDACTONE) 25 MG tablet Take 0.5 tablets by mouth daily (Patient taking differently: Take 12.5 mg by mouth 2 times daily) 30 tablet 3    miconazole (MICOTIN) 2 % powder Apply topically 2 times daily. 45 g 1    lisinopril (PRINIVIL;ZESTRIL) 10 MG tablet Take 1 tablet by mouth daily 30 tablet 3    furosemide (LASIX) 40 MG tablet Take 1 tablet by mouth daily 60 tablet 3    linagliptin (TRADJENTA) 5 MG tablet Take 5 mg by mouth daily      metFORMIN ER (GLUCOPHAGE-XR) 750 MG XR tablet Take 1,500 mg by mouth daily (with breakfast) 2 tabs in morning       atorvastatin (LIPITOR) 10 MG tablet Take 10 mg by mouth daily. tamsulosin (FLOMAX) 0.4 MG capsule Take 0.8 mg by mouth daily       aspirin 81 MG EC tablet Take 81 mg by mouth daily. vitamin E 400 UNIT capsule Take 400 Units by mouth daily. niacin 100 MG tablet Take 100 mg by mouth daily (with breakfast). Omega-3 Fatty Acids (FISH OIL) 1000 MG CAPS Take 3,000 mg by mouth 3 times daily. ascorbic acid (VITAMIN C) 500 MG tablet Take 500 mg by mouth daily. No current facility-administered medications on file prior to encounter. REVIEW OF SYSTEMS  A comprehensive review of systems was negative except for: Pertinent items are noted in HPI. Objective: There were no vitals taken for this visit. Wt Readings from Last 3 Encounters:   05/05/22 179 lb 10.8 oz (81.5 kg)   06/26/15 215 lb (97.5 kg)   05/06/15 215 lb (97.5 kg)       PHYSICAL EXAM  General Appearance/Constitutional: alert and oriented to person, place and time,  and in no acute distress. Nontoxic. Skin: warm and dry, no rash, positive wound per LDA documentation if applicable. Head: normocephalic and atraumatic.   Eyes: extraocular eye movements intact, conjunctivae normal, and sclera anicteric. ENT: hearing grossly normal bilaterally. Normal appearance. Pulmonary/Chest: no chest wall tenderness and clear anteriorly. No respiratory distress. Cardiovascular: normal rate and regular rhythm. GI: abdomen soft, non-tender and non-distended. Musculoskeletal: normal range of motion of joints. Nontender calves. No cyanosis. Nontender calves. Edema 2+  Neurologic: no gross cranial nerve deficit and speech normal. No focal deficits. Mental status normal.    Medical Decision Makin-year-old male status post mechanical fall with nonhealing ulcer to left lower extremity and contusion to left knee and left anterior tibial surface  Assessment required other independent historian(s): Yes. Additional Historian: patient  and daughter . Comorbid conditions affecting wound healing: As noted in [facility] which was reviewed. Problem List Items Addressed This Visit          Endocrine    Diabetes mellitus with skin ulcer ([facility] - Primary    Relevant Medications    lidocaine (LMX) 4 % cream    Other Relevant Orders    Initiate Outpatient Wound Care Protocol       Other    Non-pressure chronic ulcer of left lower leg with fat layer exposed ([facility]    Relevant Medications    lidocaine (LMX) 4 % cream    Other Relevant Orders    Initiate Outpatient Wound Care Protocol    Impaired mobility               Relevant Medications    lidocaine (LMX) 4 % cream    Other Relevant Orders    Initiate Outpatient Wound Care Protocol    Contusion of left knee and lower leg    Relevant Medications    lidocaine (LMX) 4 % cream    Other Relevant Orders    Initiate Outpatient Wound Care Protocol       Wounds and Treatment Plan:  Nonpressure ulcer left lower extremity, anterior tibial surface. Severity of fat layers exposed. Overlying soft nonviable tissue requiring debridement. Debridement done. Hydrofera blue    Other diagnoses or problems addressed:  Diabetes mellitus. Type II. Extensive education and counseling provided. Tight glycemic control stressed along with protein emphasis with meals. Impaired mobility. Recommend using the cane at all times to avoid further injuries in the future. Education provided. Left lower extremity edema. Some mild venous stasis noted. Acute inflammatory skin changes possibly from the recent trauma. Continue to monitor for signs of infection. No acute infection at this time. Elevation at all times possible. Pertinent labs reviewed. Review of medical records and external note (s) from other providers was done for this visit. New lab or imaging orders placed:  none    Prescription drug management: N/A     Risk of complications and/or mortality of patient management: This patient has a moderate risk of morbidity and mortality from additional diagnostic testing or treatment. This is due to the above conditions affecting wound healing as well as patient and procedure risk factors. Education and discussion held with patient regarding these disease processes pertinent to wound(s). Other pertinent decisions include: minor surgery or procedures as below. The patient's diagnosis or treatment is not significantly limited by social determinants of health as noted by: N/A . Discussion of management or test interpretation with another provider, other qualified health care professional, and other external source. Time spent with patient and patient care issues above the usual time needed for wound assessment and treatment was: [] 15-20 min  [] 21-30 min  [] 31-44 min  [] 45 min or more  This included time retrieving and reviewing records with patient and education provided to patient regarding disease process(es), offloading or pressure relief, nutrition needed for wound healing, smoking cessation when applicable, and infection risk.     This time also included physician non-face-to-face service time visit on the date of service such as  Preparing to see the patient (eg, review of tests)  Obtaining and/or reviewing separately obtained history  Performing a medically necessary appropriate examination and/or evaluation  Counseling and educating the patient/family/caregiver  Ordering medications, tests, or procedures  Referring and communicating with other health care professionals as needed  Documenting clinical information in the electronic or other health record  Independently interpreting results (not reported separately) and communicating results to the patient/family/caregiver  Care coordination (not reported separately)    Wound 04/30/22 Elbow Distal right elbow skin tear present upon arrival (Active)   Number of days: 164       Wound 09/13/22 Leg Left; Lower; Anterior #1 (Active)   Wound Image   09/13/22 1417   Wound Etiology Traumatic 10/11/22 1331   Wound Cleansed Cleansed with saline 10/11/22 1331   Wound Length (cm) 1.5 cm 10/11/22 1331   Wound Width (cm) 0.5 cm 10/11/22 1331   Wound Depth (cm) 0.1 cm 10/11/22 1331   Wound Surface Area (cm^2) 0.75 cm^2 10/11/22 1331   Change in Wound Size % (l*w) 94.55 10/11/22 1331   Wound Volume (cm^3) 0.075 cm^3 10/11/22 1331   Wound Healing % 95 10/11/22 1331   Post-Procedure Length (cm) 1.5 cm 10/11/22 1346   Post-Procedure Width (cm) 0.5 cm 10/11/22 1346   Post-Procedure Depth (cm) 0.1 cm 10/11/22 1346   Post-Procedure Surface Area (cm^2) 0.75 cm^2 10/11/22 1346   Post-Procedure Volume (cm^3) 0.075 cm^3 10/11/22 1346   Wound Assessment Bleeding 10/11/22 1346   Drainage Amount Scant 10/11/22 1331   Drainage Description Serosanguinous 10/11/22 1331   Odor None 10/11/22 1331   Jacquie-wound Assessment Intact 10/11/22 1331   Margins Defined edges 09/27/22 1352   Number of days: 27     Procedures done this visit:   Procedure Note  Indications:  Based on my examination of this patient's wound(s)/ulcer(s) today, debridement is required to promote healing and evaluate the wound base.   Performed by: Victorino Trammell MD  Consent obtained:  Yes  Time out taken:  Yes  Pain Control: Anesthetic  Anesthetic: 4% Lidocaine Cream   Debridement: Excisional Debridement  Using curette the wound(s)/ulcer(s) was/were debrided down through and including the removal of subcutaneous tissue. Devitalized Tissue Debrided:  fibrin, biofilm, slough, necrotic/eschar, and callus  Pre Debridement Measurements:  Are located in the [place]  Documentation Flow Sheet  Diabetic/Pressure/Non Pressure Ulcers only:  Ulcer: Non-Pressure ulcer, fat layer exposed   Wound/Ulcer #: 1  Post Debridement Measurements:  Wound/Ulcer Descriptions are Pre Debridement except measurements: Total Surface Area Debrided:  0.75 sq cm   Estimated Blood Loss:  Minimal  Hemostasis Achieved:  by pressure  Procedural Pain:  0  / 10   Post Procedural Pain:  0 / 10   Response to treatment:  Well tolerated by patient. Written patient dismissal instructions given to patient and signed by patient or POA. Patient voiced understanding that the importance of adherence to instructions is paramount to wound healing improvement or success.      Electronically signed by Hao Hernandez MD on 10/11/2022 at 1:57 PM

## 2022-10-11 NOTE — PROGRESS NOTES
[address], [address] [place] [address] Road  Telephone: [phone]        [facility] Fax # [phone]        Discharge Instructions         [address], [address] [place] [address] Road  Telephone: [phone]     Discharge Instructions     Important reminders:     **If you have any signs and symptoms of illness (Cough, fever, congestion, nausea, vomiting, diarrhea, etc.) please call the wound care center prior to your appointment. 1. Increase Protein intake for optimal wound healing  2. No added salt to reduce any swelling  3. If diabetic, maintain good glucose control  4. If you smoke, smoking prohibits wound healing, we ask that you refrain from smoking. 5. When taking antibiotics take the entire prescription as ordered. Do not stop taking until medication is all gone unless otherwise instructed. 6. Exercise as tolerated. 7. Keep weight off wounds and reposition every 2 hours if applicable. 8. If wound(s) is on your lower extremity, elevate legs to the level of the heart or above for 30 minutes 4-5 times a day and/or when sitting. Avoid standing for long periods of time. 9. Do not get wounds wet in bath or shower unless otherwise instructed by your physician. If your wound is on your foot or leg, you may purchase a cast bag. Please ask at the pharmacy. If Vascular testing is ordered, please call [address] [place] [address] [phone]) to schedule. Vascular tests ordered by Wound Care Physicians may take up to 2 hours to complete. Please keep that in mind when scheduling. If Vascular testing is scheduled, please bring supplies to replace your dressing after testing is done. The vascular department does not stock supplies. Wound: Left leg and knee     With each dressing change, rinse wounds with 0.9% Saline. (May use wound wash or soft contact solution.  Both can be purchased at a local drug store). If unable to obtain saline, may use a gentle soap and water. Dressing care: Left anterior leg wound- Apply Hydrofera blue and dry dressing change daily and as needed (m-w-f). Home Care Agency/Facility: [facility])        Your wound-care supplies will be provided by:   Please note, depending on your insurance coverage, you may have out-of-pocket expenses for these supplies. Someone from the company should call you to confirm your order and discuss those potential costs before they ship your products -- please anticipate that call. If your out-of-pocket cost could be substantial, Many companies have financial hardship programs for patients who qualify, so please ask about that if you might need a hand. If you have any questions about your supplies or your potential out-of-pocket costs, or if you need to place an order for a refill of supplies (typically monthly), please call the company directly. Your  is Gretta Cox up with Dr Essence Whaley In 4 week(s) in the wound care center. Wound Care Center Information: Should you experience any significant changes in your wound(s) or have questions about your wound care, [facility] at [phone] Monday  - Thursday 8:00 am - 4:00 pm and Friday 8:00 am - 1:00pm. If you need help with your wound outside these hours and cannot wait until we are again available, contact your PCP or go to the hospital emergency room. PLEASE NOTE: IF YOU ARE UNABLE TO OBTAIN WOUND SUPPLIES, CONTINUE TO USE THE SUPPLIES YOU HAVE AVAILABLE UNTIL YOU ARE ABLE TO REACH US. IT IS MOST IMPORTANT TO KEEP THE WOUND COVERED AT ALL TIMES. Patient Experience     Thank you for trusting us with your care. You may receive a survey from a company called [employer] asking for your feedback. We would appreciate it if you took a few minutes to share your experience.   Your input is very valuable to us.        Skilled nurse to evaluate and treat for wound care. Change dressing as ordered  once a day on Monday, Wednesday, and Friday using clean technique. Patient/Family/caregiver may change dressings as needed as instructed when Home Care unavailable. WOUNDS REQUIRING DRESSING Changes:     Wound 04/30/22 Elbow Distal right elbow skin tear present upon arrival (Active)   Number of days: 164       Wound 09/13/22 Leg Left; Lower; Anterior #1 (Active)   Wound Image   09/13/22 1417   Wound Etiology Traumatic 10/11/22 1331   Wound Cleansed Cleansed with saline 10/11/22 1331   Wound Length (cm) 1.5 cm 10/11/22 1331   Wound Width (cm) 0.5 cm 10/11/22 1331   Wound Depth (cm) 0.1 cm 10/11/22 1331   Wound Surface Area (cm^2) 0.75 cm^2 10/11/22 1331   Change in Wound Size % (l*w) 94.55 10/11/22 1331   Wound Volume (cm^3) 0.075 cm^3 10/11/22 1331   Wound Healing % 95 10/11/22 1331   Post-Procedure Length (cm) 1.5 cm 10/11/22 1346   Post-Procedure Width (cm) 0.5 cm 10/11/22 1346   Post-Procedure Depth (cm) 0.1 cm 10/11/22 1346   Post-Procedure Surface Area (cm^2) 0.75 cm^2 10/11/22 1346   Post-Procedure Volume (cm^3) 0.075 cm^3 10/11/22 1346   Wound Assessment Bleeding 10/11/22 1346   Drainage Amount Scant 10/11/22 1331   Drainage Description Serosanguinous 10/11/22 1331   Odor None 10/11/22 1331   Jacquie-wound Assessment Intact 10/11/22 1331   Margins Defined edges 09/27/22 1352   Number of days: 28          Patient seen and treated on 10/11/2022    By Dr Kayy Huff, NPI: [id number]   (provider/NPI)

## 2023-05-03 ENCOUNTER — TRANSCRIBE ORDERS (OUTPATIENT)
Dept: ADMINISTRATIVE | Age: 88
End: 2023-05-03

## 2023-05-03 DIAGNOSIS — N18.4 CHRONIC KIDNEY DISEASE, STAGE IV (SEVERE) (HCC): Primary | ICD-10-CM

## 2023-05-03 NOTE — PLAN OF CARE
Discharge instructions given. Patient verbalized understanding. Return to [address] [place] [address],[address] in 4 week(s). Called/faxed orders to [facility]. Labs reviewed with SABIHA Mcallister. Will start Spironolactone 12.5 mg daily and repeat labs in 1 week. Notified patient and he verbalized understanding and had no further questions.

## 2023-06-07 ENCOUNTER — HOSPITAL ENCOUNTER (INPATIENT)
Age: 88
LOS: 12 days | Discharge: NURSING, REHAB OR LONG-TERM CARE | DRG: 643 | End: 2023-06-19
Attending: STUDENT IN AN ORGANIZED HEALTH CARE EDUCATION/TRAINING PROGRAM | Admitting: STUDENT IN AN ORGANIZED HEALTH CARE EDUCATION/TRAINING PROGRAM
Payer: MEDICARE

## 2023-06-07 ENCOUNTER — APPOINTMENT (OUTPATIENT)
Dept: CT IMAGING | Age: 88
DRG: 643 | End: 2023-06-07
Payer: MEDICARE

## 2023-06-07 ENCOUNTER — APPOINTMENT (OUTPATIENT)
Dept: GENERAL RADIOLOGY | Age: 88
DRG: 643 | End: 2023-06-07
Payer: MEDICARE

## 2023-06-07 DIAGNOSIS — R06.89 DYSPNEA AND RESPIRATORY ABNORMALITIES: ICD-10-CM

## 2023-06-07 DIAGNOSIS — E87.1 HYPONATREMIA: Primary | ICD-10-CM

## 2023-06-07 DIAGNOSIS — N17.9 AKI (ACUTE KIDNEY INJURY) (HCC): ICD-10-CM

## 2023-06-07 DIAGNOSIS — R06.00 DYSPNEA AND RESPIRATORY ABNORMALITIES: ICD-10-CM

## 2023-06-07 PROBLEM — I50.20 HFREF (HEART FAILURE WITH REDUCED EJECTION FRACTION) (HCC): Status: ACTIVE | Noted: 2023-06-07

## 2023-06-07 PROBLEM — D64.9 ACUTE ANEMIA: Status: ACTIVE | Noted: 2023-06-07

## 2023-06-07 PROBLEM — E78.5 HYPERLIPIDEMIA ASSOCIATED WITH TYPE 2 DIABETES MELLITUS (HCC): Status: ACTIVE | Noted: 2023-06-07

## 2023-06-07 PROBLEM — E11.69 HYPERLIPIDEMIA ASSOCIATED WITH TYPE 2 DIABETES MELLITUS (HCC): Status: ACTIVE | Noted: 2023-06-07

## 2023-06-07 PROBLEM — I50.32 CHRONIC DIASTOLIC HEART FAILURE (HCC): Status: ACTIVE | Noted: 2023-06-07

## 2023-06-07 LAB
ABO + RH BLD: NORMAL
ABO + RH BLD: NORMAL
ALBUMIN SERPL-MCNC: 3.2 G/DL (ref 3.4–5)
ALBUMIN/GLOB SERPL: 1.3 {RATIO} (ref 1.1–2.2)
ALP SERPL-CCNC: 108 U/L (ref 40–129)
ALT SERPL-CCNC: 13 U/L (ref 10–40)
ANION GAP SERPL CALCULATED.3IONS-SCNC: 7 MMOL/L (ref 3–16)
ANION GAP SERPL CALCULATED.3IONS-SCNC: 9 MMOL/L (ref 3–16)
AST SERPL-CCNC: 25 U/L (ref 15–37)
BACTERIA URNS QL MICRO: ABNORMAL /HPF
BASE EXCESS BLDV CALC-SCNC: -0.8 MMOL/L (ref -3–3)
BASOPHILS # BLD: 0 K/UL (ref 0–0.2)
BASOPHILS NFR BLD: 0.2 %
BILIRUB SERPL-MCNC: 1.1 MG/DL (ref 0–1)
BILIRUB UR QL STRIP.AUTO: NEGATIVE
BLD GP AB SCN SERPL QL: NORMAL
BUN SERPL-MCNC: 24 MG/DL (ref 7–20)
BUN SERPL-MCNC: 24 MG/DL (ref 7–20)
CALCIUM SERPL-MCNC: 8.6 MG/DL (ref 8.3–10.6)
CALCIUM SERPL-MCNC: 8.9 MG/DL (ref 8.3–10.6)
CHLORIDE SERPL-SCNC: 74 MMOL/L (ref 99–110)
CHLORIDE SERPL-SCNC: 74 MMOL/L (ref 99–110)
CK SERPL-CCNC: 104 U/L (ref 39–308)
CLARITY UR: CLEAR
CO2 BLDV-SCNC: 56 MMOL/L
CO2 SERPL-SCNC: 20 MMOL/L (ref 21–32)
CO2 SERPL-SCNC: 23 MMOL/L (ref 21–32)
COHGB MFR BLDV: 4.1 % (ref 0–1.5)
COLOR UR: YELLOW
CREAT SERPL-MCNC: 1.4 MG/DL (ref 0.8–1.3)
CREAT SERPL-MCNC: 1.4 MG/DL (ref 0.8–1.3)
DEPRECATED RDW RBC AUTO: 15.2 % (ref 12.4–15.4)
EKG ATRIAL RATE: 73 BPM
EKG ATRIAL RATE: 83 BPM
EKG DIAGNOSIS: NORMAL
EKG DIAGNOSIS: NORMAL
EKG P AXIS: 53 DEGREES
EKG P-R INTERVAL: 240 MS
EKG Q-T INTERVAL: 402 MS
EKG Q-T INTERVAL: 410 MS
EKG QRS DURATION: 84 MS
EKG QRS DURATION: 84 MS
EKG QTC CALCULATION (BAZETT): 451 MS
EKG QTC CALCULATION (BAZETT): 458 MS
EKG R AXIS: -29 DEGREES
EKG R AXIS: -29 DEGREES
EKG T AXIS: -52 DEGREES
EKG T AXIS: -52 DEGREES
EKG VENTRICULAR RATE: 73 BPM
EKG VENTRICULAR RATE: 78 BPM
EOSINOPHIL # BLD: 0 K/UL (ref 0–0.6)
EOSINOPHIL NFR BLD: 0.1 %
EPI CELLS #/AREA URNS AUTO: 0 /HPF (ref 0–5)
FOLATE SERPL-MCNC: 9.18 NG/ML (ref 4.78–24.2)
GFR SERPLBLD CREATININE-BSD FMLA CKD-EPI: 48 ML/MIN/{1.73_M2}
GFR SERPLBLD CREATININE-BSD FMLA CKD-EPI: 48 ML/MIN/{1.73_M2}
GLUCOSE BLD-MCNC: 122 MG/DL (ref 70–99)
GLUCOSE BLD-MCNC: 163 MG/DL (ref 70–99)
GLUCOSE SERPL-MCNC: 113 MG/DL (ref 70–99)
GLUCOSE SERPL-MCNC: 120 MG/DL (ref 70–99)
GLUCOSE UR STRIP.AUTO-MCNC: NEGATIVE MG/DL
HCO3 BLDV-SCNC: 23.7 MMOL/L (ref 23–29)
HCT VFR BLD AUTO: 27 % (ref 40.5–52.5)
HCT VFR BLD AUTO: 28.2 % (ref 40.5–52.5)
HCT VFR BLD AUTO: 28.7 % (ref 40.5–52.5)
HEMOCCULT STL QL: NORMAL
HGB BLD-MCNC: 9.2 G/DL (ref 13.5–17.5)
HGB BLD-MCNC: 9.6 G/DL (ref 13.5–17.5)
HGB UR QL STRIP.AUTO: ABNORMAL
HYALINE CASTS #/AREA URNS AUTO: 0 /LPF (ref 0–8)
IMMATURE RETIC FRACT: 0.3 (ref 0.21–0.37)
KETONES UR STRIP.AUTO-MCNC: NEGATIVE MG/DL
LACTATE BLDV-SCNC: 1.2 MMOL/L (ref 0.4–1.9)
LDH SERPL L TO P-CCNC: 239 U/L (ref 100–190)
LEUKOCYTE ESTERASE UR QL STRIP.AUTO: NEGATIVE
LIPASE SERPL-CCNC: 54 U/L (ref 13–60)
LYMPHOCYTES # BLD: 0.5 K/UL (ref 1–5.1)
LYMPHOCYTES NFR BLD: 9.3 %
MCH RBC QN AUTO: 27.5 PG (ref 26–34)
MCHC RBC AUTO-ENTMCNC: 33.5 G/DL (ref 31–36)
MCV RBC AUTO: 82.2 FL (ref 80–100)
METHGB MFR BLDV: 0.5 %
MONOCYTES # BLD: 0.6 K/UL (ref 0–1.3)
MONOCYTES NFR BLD: 12.4 %
NEUTROPHILS # BLD: 3.9 K/UL (ref 1.7–7.7)
NEUTROPHILS NFR BLD: 78 %
NITRITE UR QL STRIP.AUTO: NEGATIVE
NT-PROBNP SERPL-MCNC: ABNORMAL PG/ML (ref 0–449)
O2 CT VFR BLDV CALC: 13 VOL %
O2 THERAPY: ABNORMAL
PCO2 BLDV: 37.6 MMHG (ref 40–50)
PERFORMED ON: ABNORMAL
PERFORMED ON: ABNORMAL
PH BLDV: 7.41 [PH] (ref 7.35–7.45)
PH UR STRIP.AUTO: 6.5 [PH] (ref 5–8)
PLATELET # BLD AUTO: 125 K/UL (ref 135–450)
PMV BLD AUTO: 9.1 FL (ref 5–10.5)
PO2 BLDV: 120 MMHG (ref 25–40)
POTASSIUM SERPL-SCNC: 4.7 MMOL/L (ref 3.5–5.1)
POTASSIUM SERPL-SCNC: 4.9 MMOL/L (ref 3.5–5.1)
PROCALCITONIN SERPL IA-MCNC: 0.13 NG/ML (ref 0–0.15)
PROT SERPL-MCNC: 5.6 G/DL (ref 6.4–8.2)
PROT UR STRIP.AUTO-MCNC: NEGATIVE MG/DL
RBC # BLD AUTO: 3.49 M/UL (ref 4.2–5.9)
RBC CLUMPS #/AREA URNS AUTO: 36 /HPF (ref 0–4)
RETICS # AUTO: 0.03 M/UL
RETICS/RBC NFR AUTO: 0.88 % (ref 0.5–2.18)
SAO2 % BLDV: 100 %
SODIUM SERPL-SCNC: 103 MMOL/L (ref 136–145)
SODIUM SERPL-SCNC: 104 MMOL/L (ref 136–145)
SODIUM SERPL-SCNC: 107 MMOL/L (ref 136–145)
SODIUM SERPL-SCNC: 107 MMOL/L (ref 136–145)
SODIUM SERPL-SCNC: 109 MMOL/L (ref 136–145)
SP GR UR STRIP.AUTO: 1.01 (ref 1–1.03)
TROPONIN, HIGH SENSITIVITY: 45 NG/L (ref 0–22)
TROPONIN, HIGH SENSITIVITY: 48 NG/L (ref 0–22)
UA COMPLETE W REFLEX CULTURE PNL UR: ABNORMAL
UA DIPSTICK W REFLEX MICRO PNL UR: YES
URN SPEC COLLECT METH UR: ABNORMAL
UROBILINOGEN UR STRIP-ACNC: 1 E.U./DL
VIT B12 SERPL-MCNC: 450 PG/ML (ref 211–911)
WBC # BLD AUTO: 5 K/UL (ref 4–11)
WBC #/AREA URNS AUTO: 1 /HPF (ref 0–5)

## 2023-06-07 PROCEDURE — 83615 LACTATE (LD) (LDH) ENZYME: CPT

## 2023-06-07 PROCEDURE — 83880 ASSAY OF NATRIURETIC PEPTIDE: CPT

## 2023-06-07 PROCEDURE — 51702 INSERT TEMP BLADDER CATH: CPT

## 2023-06-07 PROCEDURE — 6370000000 HC RX 637 (ALT 250 FOR IP): Performed by: INTERNAL MEDICINE

## 2023-06-07 PROCEDURE — 86901 BLOOD TYPING SEROLOGIC RH(D): CPT

## 2023-06-07 PROCEDURE — 36415 COLL VENOUS BLD VENIPUNCTURE: CPT

## 2023-06-07 PROCEDURE — 84145 PROCALCITONIN (PCT): CPT

## 2023-06-07 PROCEDURE — 82607 VITAMIN B-12: CPT

## 2023-06-07 PROCEDURE — C9113 INJ PANTOPRAZOLE SODIUM, VIA: HCPCS | Performed by: STUDENT IN AN ORGANIZED HEALTH CARE EDUCATION/TRAINING PROGRAM

## 2023-06-07 PROCEDURE — 85025 COMPLETE CBC W/AUTO DIFF WBC: CPT

## 2023-06-07 PROCEDURE — 2580000003 HC RX 258: Performed by: STUDENT IN AN ORGANIZED HEALTH CARE EDUCATION/TRAINING PROGRAM

## 2023-06-07 PROCEDURE — 84484 ASSAY OF TROPONIN QUANT: CPT

## 2023-06-07 PROCEDURE — 93010 ELECTROCARDIOGRAM REPORT: CPT | Performed by: INTERNAL MEDICINE

## 2023-06-07 PROCEDURE — 96365 THER/PROPH/DIAG IV INF INIT: CPT

## 2023-06-07 PROCEDURE — 84295 ASSAY OF SERUM SODIUM: CPT

## 2023-06-07 PROCEDURE — 74176 CT ABD & PELVIS W/O CONTRAST: CPT

## 2023-06-07 PROCEDURE — 6370000000 HC RX 637 (ALT 250 FOR IP): Performed by: STUDENT IN AN ORGANIZED HEALTH CARE EDUCATION/TRAINING PROGRAM

## 2023-06-07 PROCEDURE — 6360000002 HC RX W HCPCS: Performed by: STUDENT IN AN ORGANIZED HEALTH CARE EDUCATION/TRAINING PROGRAM

## 2023-06-07 PROCEDURE — 80061 LIPID PANEL: CPT

## 2023-06-07 PROCEDURE — 82270 OCCULT BLOOD FECES: CPT

## 2023-06-07 PROCEDURE — 83010 ASSAY OF HAPTOGLOBIN QUANT: CPT

## 2023-06-07 PROCEDURE — 83540 ASSAY OF IRON: CPT

## 2023-06-07 PROCEDURE — 71045 X-RAY EXAM CHEST 1 VIEW: CPT

## 2023-06-07 PROCEDURE — 83036 HEMOGLOBIN GLYCOSYLATED A1C: CPT

## 2023-06-07 PROCEDURE — 93005 ELECTROCARDIOGRAM TRACING: CPT | Performed by: STUDENT IN AN ORGANIZED HEALTH CARE EDUCATION/TRAINING PROGRAM

## 2023-06-07 PROCEDURE — 85045 AUTOMATED RETICULOCYTE COUNT: CPT

## 2023-06-07 PROCEDURE — 85014 HEMATOCRIT: CPT

## 2023-06-07 PROCEDURE — 70450 CT HEAD/BRAIN W/O DYE: CPT

## 2023-06-07 PROCEDURE — 83550 IRON BINDING TEST: CPT

## 2023-06-07 PROCEDURE — 2580000003 HC RX 258: Performed by: INTERNAL MEDICINE

## 2023-06-07 PROCEDURE — 85018 HEMOGLOBIN: CPT

## 2023-06-07 PROCEDURE — 84300 ASSAY OF URINE SODIUM: CPT

## 2023-06-07 PROCEDURE — 80053 COMPREHEN METABOLIC PANEL: CPT

## 2023-06-07 PROCEDURE — 82436 ASSAY OF URINE CHLORIDE: CPT

## 2023-06-07 PROCEDURE — 83605 ASSAY OF LACTIC ACID: CPT

## 2023-06-07 PROCEDURE — 51798 US URINE CAPACITY MEASURE: CPT

## 2023-06-07 PROCEDURE — 82570 ASSAY OF URINE CREATININE: CPT

## 2023-06-07 PROCEDURE — 99285 EMERGENCY DEPT VISIT HI MDM: CPT

## 2023-06-07 PROCEDURE — 86850 RBC ANTIBODY SCREEN: CPT

## 2023-06-07 PROCEDURE — 2000000000 HC ICU R&B

## 2023-06-07 PROCEDURE — 82550 ASSAY OF CK (CPK): CPT

## 2023-06-07 PROCEDURE — 83935 ASSAY OF URINE OSMOLALITY: CPT

## 2023-06-07 PROCEDURE — 82803 BLOOD GASES ANY COMBINATION: CPT

## 2023-06-07 PROCEDURE — 83690 ASSAY OF LIPASE: CPT

## 2023-06-07 PROCEDURE — 84443 ASSAY THYROID STIM HORMONE: CPT

## 2023-06-07 PROCEDURE — 81001 URINALYSIS AUTO W/SCOPE: CPT

## 2023-06-07 PROCEDURE — 84133 ASSAY OF URINE POTASSIUM: CPT

## 2023-06-07 PROCEDURE — 86900 BLOOD TYPING SEROLOGIC ABO: CPT

## 2023-06-07 PROCEDURE — 82746 ASSAY OF FOLIC ACID SERUM: CPT

## 2023-06-07 RX ORDER — 0.9 % SODIUM CHLORIDE 0.9 %
1000 INTRAVENOUS SOLUTION INTRAVENOUS ONCE
Status: DISCONTINUED | OUTPATIENT
Start: 2023-06-07 | End: 2023-06-07 | Stop reason: SDUPTHER

## 2023-06-07 RX ORDER — ACETAMINOPHEN 650 MG/1
650 SUPPOSITORY RECTAL EVERY 6 HOURS PRN
Status: DISCONTINUED | OUTPATIENT
Start: 2023-06-07 | End: 2023-06-19 | Stop reason: HOSPADM

## 2023-06-07 RX ORDER — POTASSIUM CHLORIDE 7.45 MG/ML
10 INJECTION INTRAVENOUS PRN
Status: DISCONTINUED | OUTPATIENT
Start: 2023-06-07 | End: 2023-06-19 | Stop reason: HOSPADM

## 2023-06-07 RX ORDER — DEXTROSE MONOHYDRATE 100 MG/ML
INJECTION, SOLUTION INTRAVENOUS CONTINUOUS PRN
Status: DISCONTINUED | OUTPATIENT
Start: 2023-06-07 | End: 2023-06-19 | Stop reason: HOSPADM

## 2023-06-07 RX ORDER — SODIUM CHLORIDE 9 MG/ML
INJECTION, SOLUTION INTRAVENOUS PRN
Status: DISCONTINUED | OUTPATIENT
Start: 2023-06-07 | End: 2023-06-19 | Stop reason: HOSPADM

## 2023-06-07 RX ORDER — SODIUM CHLORIDE 0.9 % (FLUSH) 0.9 %
5-40 SYRINGE (ML) INJECTION PRN
Status: DISCONTINUED | OUTPATIENT
Start: 2023-06-07 | End: 2023-06-19 | Stop reason: HOSPADM

## 2023-06-07 RX ORDER — 3% SODIUM CHLORIDE 3 G/100ML
150 INJECTION, SOLUTION INTRAVENOUS ONCE
Status: COMPLETED | OUTPATIENT
Start: 2023-06-07 | End: 2023-06-07

## 2023-06-07 RX ORDER — ATORVASTATIN CALCIUM 10 MG/1
10 TABLET, FILM COATED ORAL DAILY
Status: DISCONTINUED | OUTPATIENT
Start: 2023-06-07 | End: 2023-06-19 | Stop reason: HOSPADM

## 2023-06-07 RX ORDER — INSULIN LISPRO 100 [IU]/ML
0-4 INJECTION, SOLUTION INTRAVENOUS; SUBCUTANEOUS NIGHTLY
Status: DISCONTINUED | OUTPATIENT
Start: 2023-06-07 | End: 2023-06-19 | Stop reason: HOSPADM

## 2023-06-07 RX ORDER — AMLODIPINE BESYLATE 5 MG/1
2.5 TABLET ORAL DAILY
Status: DISCONTINUED | OUTPATIENT
Start: 2023-06-07 | End: 2023-06-08

## 2023-06-07 RX ORDER — MAGNESIUM SULFATE IN WATER 40 MG/ML
2000 INJECTION, SOLUTION INTRAVENOUS PRN
Status: DISCONTINUED | OUTPATIENT
Start: 2023-06-07 | End: 2023-06-19 | Stop reason: HOSPADM

## 2023-06-07 RX ORDER — 0.9 % SODIUM CHLORIDE 0.9 %
1000 INTRAVENOUS SOLUTION INTRAVENOUS ONCE
Status: DISCONTINUED | OUTPATIENT
Start: 2023-06-07 | End: 2023-06-08

## 2023-06-07 RX ORDER — ACETAMINOPHEN 325 MG/1
650 TABLET ORAL EVERY 6 HOURS PRN
Status: DISCONTINUED | OUTPATIENT
Start: 2023-06-07 | End: 2023-06-19 | Stop reason: HOSPADM

## 2023-06-07 RX ORDER — ONDANSETRON 4 MG/1
4 TABLET, ORALLY DISINTEGRATING ORAL EVERY 8 HOURS PRN
Status: DISCONTINUED | OUTPATIENT
Start: 2023-06-07 | End: 2023-06-19 | Stop reason: HOSPADM

## 2023-06-07 RX ORDER — POLYETHYLENE GLYCOL 3350 17 G/17G
17 POWDER, FOR SOLUTION ORAL DAILY PRN
Status: DISCONTINUED | OUTPATIENT
Start: 2023-06-07 | End: 2023-06-19 | Stop reason: HOSPADM

## 2023-06-07 RX ORDER — METOPROLOL SUCCINATE 25 MG/1
25 TABLET, EXTENDED RELEASE ORAL DAILY
Status: DISCONTINUED | OUTPATIENT
Start: 2023-06-07 | End: 2023-06-08

## 2023-06-07 RX ORDER — POTASSIUM CHLORIDE 29.8 MG/ML
20 INJECTION INTRAVENOUS PRN
Status: DISCONTINUED | OUTPATIENT
Start: 2023-06-07 | End: 2023-06-19 | Stop reason: HOSPADM

## 2023-06-07 RX ORDER — ONDANSETRON 2 MG/ML
4 INJECTION INTRAMUSCULAR; INTRAVENOUS EVERY 6 HOURS PRN
Status: DISCONTINUED | OUTPATIENT
Start: 2023-06-07 | End: 2023-06-19 | Stop reason: HOSPADM

## 2023-06-07 RX ORDER — SODIUM CHLORIDE 9 MG/ML
INJECTION, SOLUTION INTRAVENOUS CONTINUOUS
Status: DISCONTINUED | OUTPATIENT
Start: 2023-06-07 | End: 2023-06-08 | Stop reason: ALTCHOICE

## 2023-06-07 RX ORDER — SODIUM CHLORIDE 0.9 % (FLUSH) 0.9 %
5-40 SYRINGE (ML) INJECTION EVERY 12 HOURS SCHEDULED
Status: DISCONTINUED | OUTPATIENT
Start: 2023-06-07 | End: 2023-06-19 | Stop reason: HOSPADM

## 2023-06-07 RX ORDER — INSULIN LISPRO 100 [IU]/ML
0-4 INJECTION, SOLUTION INTRAVENOUS; SUBCUTANEOUS
Status: DISCONTINUED | OUTPATIENT
Start: 2023-06-07 | End: 2023-06-19 | Stop reason: HOSPADM

## 2023-06-07 RX ADMIN — PANTOPRAZOLE SODIUM 80 MG: 40 INJECTION, POWDER, FOR SOLUTION INTRAVENOUS at 10:19

## 2023-06-07 RX ADMIN — PANTOPRAZOLE SODIUM 8 MG/HR: 40 INJECTION, POWDER, FOR SOLUTION INTRAVENOUS at 12:08

## 2023-06-07 RX ADMIN — ONDANSETRON 4 MG: 2 INJECTION INTRAMUSCULAR; INTRAVENOUS at 14:27

## 2023-06-07 RX ADMIN — Medication 10 ML: at 20:17

## 2023-06-07 RX ADMIN — AMLODIPINE BESYLATE 2.5 MG: 5 TABLET ORAL at 17:45

## 2023-06-07 RX ADMIN — ATORVASTATIN CALCIUM 10 MG: 10 TABLET, FILM COATED ORAL at 17:45

## 2023-06-07 RX ADMIN — SODIUM CHLORIDE: 9 INJECTION, SOLUTION INTRAVENOUS at 17:40

## 2023-06-07 RX ADMIN — PANTOPRAZOLE SODIUM 8 MG/HR: 40 INJECTION, POWDER, FOR SOLUTION INTRAVENOUS at 20:15

## 2023-06-07 RX ADMIN — METOPROLOL SUCCINATE 25 MG: 25 TABLET, EXTENDED RELEASE ORAL at 17:45

## 2023-06-07 RX ADMIN — SODIUM CHLORIDE 150 ML: 3 INJECTION, SOLUTION INTRAVENOUS at 11:24

## 2023-06-07 ASSESSMENT — ENCOUNTER SYMPTOMS
CONSTIPATION: 0
DIARRHEA: 1
BACK PAIN: 1
SHORTNESS OF BREATH: 0
COUGH: 0
VOMITING: 0
NAUSEA: 0
ABDOMINAL PAIN: 0

## 2023-06-07 ASSESSMENT — LIFESTYLE VARIABLES: HOW OFTEN DO YOU HAVE A DRINK CONTAINING ALCOHOL: NEVER

## 2023-06-07 ASSESSMENT — PAIN SCALES - GENERAL
PAINLEVEL_OUTOF10: 0

## 2023-06-07 ASSESSMENT — PAIN - FUNCTIONAL ASSESSMENT: PAIN_FUNCTIONAL_ASSESSMENT: NONE - DENIES PAIN

## 2023-06-08 LAB
ALBUMIN SERPL-MCNC: 2.7 G/DL (ref 3.4–5)
ALP SERPL-CCNC: 90 U/L (ref 40–129)
ALT SERPL-CCNC: 9 U/L (ref 10–40)
ANION GAP SERPL CALCULATED.3IONS-SCNC: 6 MMOL/L (ref 3–16)
AST SERPL-CCNC: 19 U/L (ref 15–37)
BASOPHILS # BLD: 0 K/UL (ref 0–0.2)
BASOPHILS NFR BLD: 0.1 %
BILIRUB DIRECT SERPL-MCNC: 0.4 MG/DL (ref 0–0.3)
BILIRUB INDIRECT SERPL-MCNC: 0.5 MG/DL (ref 0–1)
BILIRUB SERPL-MCNC: 0.9 MG/DL (ref 0–1)
BUN SERPL-MCNC: 21 MG/DL (ref 7–20)
CALCIUM SERPL-MCNC: 8.1 MG/DL (ref 8.3–10.6)
CHLORIDE SERPL-SCNC: 84 MMOL/L (ref 99–110)
CHLORIDE UR-SCNC: 50 MMOL/L
CHOLEST SERPL-MCNC: 108 MG/DL (ref 0–199)
CO2 SERPL-SCNC: 22 MMOL/L (ref 21–32)
CREAT SERPL-MCNC: 1.5 MG/DL (ref 0.8–1.3)
CREAT UR-MCNC: 30.7 MG/DL (ref 39–259)
CREAT UR-MCNC: 44.1 MG/DL (ref 39–259)
DEPRECATED RDW RBC AUTO: 14.9 % (ref 12.4–15.4)
EOSINOPHIL # BLD: 0 K/UL (ref 0–0.6)
EOSINOPHIL NFR BLD: 0.4 %
EST. AVERAGE GLUCOSE BLD GHB EST-MCNC: 122.6 MG/DL
GFR SERPLBLD CREATININE-BSD FMLA CKD-EPI: 44 ML/MIN/{1.73_M2}
GLUCOSE BLD-MCNC: 122 MG/DL (ref 70–99)
GLUCOSE BLD-MCNC: 182 MG/DL (ref 70–99)
GLUCOSE BLD-MCNC: 90 MG/DL (ref 70–99)
GLUCOSE BLD-MCNC: 91 MG/DL (ref 70–99)
GLUCOSE SERPL-MCNC: 84 MG/DL (ref 70–99)
HAPTOGLOB SERPL-MCNC: 20 MG/DL (ref 30–200)
HBA1C MFR BLD: 5.9 %
HCT VFR BLD AUTO: 25 % (ref 40.5–52.5)
HCT VFR BLD AUTO: 25.4 % (ref 40.5–52.5)
HCT VFR BLD AUTO: 25.7 % (ref 40.5–52.5)
HCT VFR BLD AUTO: 26 % (ref 40.5–52.5)
HCT VFR BLD AUTO: 26.8 % (ref 40.5–52.5)
HDLC SERPL-MCNC: 44 MG/DL (ref 40–60)
HGB BLD-MCNC: 8.3 G/DL (ref 13.5–17.5)
HGB BLD-MCNC: 8.6 G/DL (ref 13.5–17.5)
HGB BLD-MCNC: 8.7 G/DL (ref 13.5–17.5)
HGB BLD-MCNC: 8.8 G/DL (ref 13.5–17.5)
HGB BLD-MCNC: 8.8 G/DL (ref 13.5–17.5)
IRON SATN MFR SERPL: 43 % (ref 20–50)
IRON SERPL-MCNC: 73 UG/DL (ref 59–158)
LACTATE BLDV-SCNC: 0.8 MMOL/L (ref 0.4–2)
LDLC SERPL CALC-MCNC: 55 MG/DL
LV EF: 48 %
LVEF MODALITY: NORMAL
LYMPHOCYTES # BLD: 0.6 K/UL (ref 1–5.1)
LYMPHOCYTES NFR BLD: 11.7 %
MAGNESIUM SERPL-MCNC: 1.9 MG/DL (ref 1.8–2.4)
MCH RBC QN AUTO: 27.8 PG (ref 26–34)
MCHC RBC AUTO-ENTMCNC: 33.7 G/DL (ref 31–36)
MCV RBC AUTO: 82.3 FL (ref 80–100)
MONOCYTES # BLD: 0.9 K/UL (ref 0–1.3)
MONOCYTES NFR BLD: 16.4 %
NEUTROPHILS # BLD: 4 K/UL (ref 1.7–7.7)
NEUTROPHILS NFR BLD: 71.4 %
OSMOLALITY UR: 329 MOSM/KG (ref 390–1070)
PERFORMED ON: ABNORMAL
PERFORMED ON: ABNORMAL
PERFORMED ON: NORMAL
PERFORMED ON: NORMAL
PHOSPHATE SERPL-MCNC: 2.9 MG/DL (ref 2.5–4.9)
PLATELET # BLD AUTO: 107 K/UL (ref 135–450)
PMV BLD AUTO: 8.8 FL (ref 5–10.5)
POTASSIUM SERPL-SCNC: 4.6 MMOL/L (ref 3.5–5.1)
POTASSIUM UR-SCNC: 33.2 MMOL/L
PROT SERPL-MCNC: 4.6 G/DL (ref 6.4–8.2)
PROT UR-MCNC: 0.01 G/DL
PROT UR-MCNC: 12 MG/DL
PROT UR-MCNC: 13 MG/DL
PROT/CREAT UR-RTO: 0.4 MG/DL
RBC # BLD AUTO: 3.09 M/UL (ref 4.2–5.9)
SODIUM SERPL-SCNC: 107 MMOL/L (ref 136–145)
SODIUM SERPL-SCNC: 108 MMOL/L (ref 136–145)
SODIUM SERPL-SCNC: 112 MMOL/L (ref 136–145)
SODIUM SERPL-SCNC: 112 MMOL/L (ref 136–145)
SODIUM SERPL-SCNC: 115 MMOL/L (ref 136–145)
SODIUM UR-SCNC: 51 MMOL/L
TIBC SERPL-MCNC: 169 UG/DL (ref 260–445)
TRIGL SERPL-MCNC: 45 MG/DL (ref 0–150)
TSH SERPL DL<=0.005 MIU/L-ACNC: 0.91 UIU/ML (ref 0.27–4.2)
VLDLC SERPL CALC-MCNC: 9 MG/DL
WBC # BLD AUTO: 5.5 K/UL (ref 4–11)

## 2023-06-08 PROCEDURE — 84165 PROTEIN E-PHORESIS SERUM: CPT

## 2023-06-08 PROCEDURE — 80076 HEPATIC FUNCTION PANEL: CPT

## 2023-06-08 PROCEDURE — 84295 ASSAY OF SERUM SODIUM: CPT

## 2023-06-08 PROCEDURE — 97162 PT EVAL MOD COMPLEX 30 MIN: CPT

## 2023-06-08 PROCEDURE — 2580000003 HC RX 258: Performed by: STUDENT IN AN ORGANIZED HEALTH CARE EDUCATION/TRAINING PROGRAM

## 2023-06-08 PROCEDURE — 97530 THERAPEUTIC ACTIVITIES: CPT

## 2023-06-08 PROCEDURE — 2000000000 HC ICU R&B

## 2023-06-08 PROCEDURE — 85018 HEMOGLOBIN: CPT

## 2023-06-08 PROCEDURE — 84155 ASSAY OF PROTEIN SERUM: CPT

## 2023-06-08 PROCEDURE — C9113 INJ PANTOPRAZOLE SODIUM, VIA: HCPCS | Performed by: STUDENT IN AN ORGANIZED HEALTH CARE EDUCATION/TRAINING PROGRAM

## 2023-06-08 PROCEDURE — 85025 COMPLETE CBC W/AUTO DIFF WBC: CPT

## 2023-06-08 PROCEDURE — 6370000000 HC RX 637 (ALT 250 FOR IP): Performed by: STUDENT IN AN ORGANIZED HEALTH CARE EDUCATION/TRAINING PROGRAM

## 2023-06-08 PROCEDURE — 83605 ASSAY OF LACTIC ACID: CPT

## 2023-06-08 PROCEDURE — 80069 RENAL FUNCTION PANEL: CPT

## 2023-06-08 PROCEDURE — 36415 COLL VENOUS BLD VENIPUNCTURE: CPT

## 2023-06-08 PROCEDURE — 97535 SELF CARE MNGMENT TRAINING: CPT

## 2023-06-08 PROCEDURE — 84156 ASSAY OF PROTEIN URINE: CPT

## 2023-06-08 PROCEDURE — 97165 OT EVAL LOW COMPLEX 30 MIN: CPT

## 2023-06-08 PROCEDURE — 83735 ASSAY OF MAGNESIUM: CPT

## 2023-06-08 PROCEDURE — 6360000002 HC RX W HCPCS: Performed by: STUDENT IN AN ORGANIZED HEALTH CARE EDUCATION/TRAINING PROGRAM

## 2023-06-08 PROCEDURE — 2580000003 HC RX 258: Performed by: INTERNAL MEDICINE

## 2023-06-08 PROCEDURE — APPNB30 APP NON BILLABLE TIME 0-30 MINS: Performed by: NURSE PRACTITIONER

## 2023-06-08 PROCEDURE — 82570 ASSAY OF URINE CREATININE: CPT

## 2023-06-08 PROCEDURE — APPSS60 APP SPLIT SHARED TIME 46-60 MINUTES: Performed by: NURSE PRACTITIONER

## 2023-06-08 PROCEDURE — 85014 HEMATOCRIT: CPT

## 2023-06-08 PROCEDURE — 93306 TTE W/DOPPLER COMPLETE: CPT

## 2023-06-08 PROCEDURE — 84166 PROTEIN E-PHORESIS/URINE/CSF: CPT

## 2023-06-08 RX ORDER — DEXTROSE MONOHYDRATE 50 MG/ML
INJECTION, SOLUTION INTRAVENOUS CONTINUOUS
Status: ACTIVE | OUTPATIENT
Start: 2023-06-08 | End: 2023-06-08

## 2023-06-08 RX ORDER — SODIUM CHLORIDE 9 MG/ML
INJECTION, SOLUTION INTRAVENOUS CONTINUOUS
Status: DISCONTINUED | OUTPATIENT
Start: 2023-06-08 | End: 2023-06-08

## 2023-06-08 RX ORDER — DEXTROSE MONOHYDRATE 50 MG/ML
INJECTION, SOLUTION INTRAVENOUS CONTINUOUS
Status: DISCONTINUED | OUTPATIENT
Start: 2023-06-08 | End: 2023-06-08

## 2023-06-08 RX ORDER — METOPROLOL SUCCINATE 25 MG/1
12.5 TABLET, EXTENDED RELEASE ORAL DAILY
Status: DISCONTINUED | OUTPATIENT
Start: 2023-06-08 | End: 2023-06-16

## 2023-06-08 RX ADMIN — PANTOPRAZOLE SODIUM 8 MG/HR: 40 INJECTION, POWDER, FOR SOLUTION INTRAVENOUS at 15:16

## 2023-06-08 RX ADMIN — ATORVASTATIN CALCIUM 10 MG: 10 TABLET, FILM COATED ORAL at 17:12

## 2023-06-08 RX ADMIN — Medication 10 ML: at 08:46

## 2023-06-08 RX ADMIN — SODIUM CHLORIDE: 9 INJECTION, SOLUTION INTRAVENOUS at 13:32

## 2023-06-08 RX ADMIN — Medication 10 ML: at 20:33

## 2023-06-08 RX ADMIN — PANTOPRAZOLE SODIUM 8 MG/HR: 40 INJECTION, POWDER, FOR SOLUTION INTRAVENOUS at 04:16

## 2023-06-08 RX ADMIN — ACETAMINOPHEN 650 MG: 325 TABLET ORAL at 18:45

## 2023-06-08 RX ADMIN — DEXTROSE MONOHYDRATE: 50 INJECTION, SOLUTION INTRAVENOUS at 05:49

## 2023-06-08 ASSESSMENT — PAIN SCALES - GENERAL
PAINLEVEL_OUTOF10: 0
PAINLEVEL_OUTOF10: 2
PAINLEVEL_OUTOF10: 0
PAINLEVEL_OUTOF10: 0
PAINLEVEL_OUTOF10: 8
PAINLEVEL_OUTOF10: 0
PAINLEVEL_OUTOF10: 0

## 2023-06-08 ASSESSMENT — PAIN DESCRIPTION - LOCATION
LOCATION: NECK
LOCATION: NECK

## 2023-06-08 ASSESSMENT — PAIN DESCRIPTION - ORIENTATION: ORIENTATION: MID

## 2023-06-08 ASSESSMENT — PAIN DESCRIPTION - DESCRIPTORS: DESCRIPTORS: ACHING

## 2023-06-09 LAB
ALBUMIN SERPL ELPH-MCNC: 2.2 G/DL (ref 3.1–4.9)
ALBUMIN SERPL-MCNC: 2.6 G/DL (ref 3.4–5)
ALBUMIN/GLOB SERPL: 1.4 {RATIO} (ref 1.1–2.2)
ALP SERPL-CCNC: 90 U/L (ref 40–129)
ALPHA1 GLOB SERPL ELPH-MCNC: 0.2 G/DL (ref 0.2–0.4)
ALPHA2 GLOB SERPL ELPH-MCNC: 0.4 G/DL (ref 0.4–1.1)
ALT SERPL-CCNC: 11 U/L (ref 10–40)
ANION GAP SERPL CALCULATED.3IONS-SCNC: 4 MMOL/L (ref 3–16)
AST SERPL-CCNC: 22 U/L (ref 15–37)
B-GLOBULIN SERPL ELPH-MCNC: 0.6 G/DL (ref 0.9–1.6)
BASOPHILS # BLD: 0 K/UL (ref 0–0.2)
BASOPHILS NFR BLD: 0.4 %
BILIRUB SERPL-MCNC: 0.8 MG/DL (ref 0–1)
BUN SERPL-MCNC: 23 MG/DL (ref 7–20)
CALCIUM SERPL-MCNC: 8.1 MG/DL (ref 8.3–10.6)
CHLORIDE SERPL-SCNC: 87 MMOL/L (ref 99–110)
CO2 SERPL-SCNC: 24 MMOL/L (ref 21–32)
CREAT SERPL-MCNC: 1.6 MG/DL (ref 0.8–1.3)
DEPRECATED RDW RBC AUTO: 15.6 % (ref 12.4–15.4)
EOSINOPHIL # BLD: 0.1 K/UL (ref 0–0.6)
EOSINOPHIL NFR BLD: 1.3 %
GAMMA GLOB SERPL ELPH-MCNC: 0.9 G/DL (ref 0.6–1.8)
GFR SERPLBLD CREATININE-BSD FMLA CKD-EPI: 41 ML/MIN/{1.73_M2}
GLUCOSE BLD-MCNC: 149 MG/DL (ref 70–99)
GLUCOSE BLD-MCNC: 164 MG/DL (ref 70–99)
GLUCOSE BLD-MCNC: 171 MG/DL (ref 70–99)
GLUCOSE BLD-MCNC: 96 MG/DL (ref 70–99)
GLUCOSE SERPL-MCNC: 93 MG/DL (ref 70–99)
HCT VFR BLD AUTO: 23.9 % (ref 40.5–52.5)
HGB BLD-MCNC: 8.2 G/DL (ref 13.5–17.5)
LYMPHOCYTES # BLD: 0.7 K/UL (ref 1–5.1)
LYMPHOCYTES NFR BLD: 12.5 %
MAGNESIUM SERPL-MCNC: 2 MG/DL (ref 1.8–2.4)
MCH RBC QN AUTO: 27.9 PG (ref 26–34)
MCHC RBC AUTO-ENTMCNC: 34 G/DL (ref 31–36)
MCV RBC AUTO: 81.8 FL (ref 80–100)
MONOCYTES # BLD: 0.9 K/UL (ref 0–1.3)
MONOCYTES NFR BLD: 15.4 %
NEUTROPHILS # BLD: 4 K/UL (ref 1.7–7.7)
NEUTROPHILS NFR BLD: 70.4 %
PERFORMED ON: ABNORMAL
PERFORMED ON: NORMAL
PHOSPHATE SERPL-MCNC: 2.6 MG/DL (ref 2.5–4.9)
PLATELET # BLD AUTO: 111 K/UL (ref 135–450)
PMV BLD AUTO: 8.8 FL (ref 5–10.5)
POTASSIUM SERPL-SCNC: 4.9 MMOL/L (ref 3.5–5.1)
PROT SERPL-MCNC: 4.3 G/DL (ref 6.4–8.2)
PROT SERPL-MCNC: 4.5 G/DL (ref 6.4–8.2)
RBC # BLD AUTO: 2.93 M/UL (ref 4.2–5.9)
SODIUM SERPL-SCNC: 109 MMOL/L (ref 136–145)
SODIUM SERPL-SCNC: 113 MMOL/L (ref 136–145)
SODIUM SERPL-SCNC: 115 MMOL/L (ref 136–145)
SODIUM SERPL-SCNC: 116 MMOL/L (ref 136–145)
SODIUM SERPL-SCNC: 117 MMOL/L (ref 136–145)
SPE/IFE INTERPRETATION: NORMAL
WBC # BLD AUTO: 5.7 K/UL (ref 4–11)

## 2023-06-09 PROCEDURE — 2580000003 HC RX 258: Performed by: STUDENT IN AN ORGANIZED HEALTH CARE EDUCATION/TRAINING PROGRAM

## 2023-06-09 PROCEDURE — 36415 COLL VENOUS BLD VENIPUNCTURE: CPT

## 2023-06-09 PROCEDURE — 85025 COMPLETE CBC W/AUTO DIFF WBC: CPT

## 2023-06-09 PROCEDURE — 84100 ASSAY OF PHOSPHORUS: CPT

## 2023-06-09 PROCEDURE — 6370000000 HC RX 637 (ALT 250 FOR IP): Performed by: STUDENT IN AN ORGANIZED HEALTH CARE EDUCATION/TRAINING PROGRAM

## 2023-06-09 PROCEDURE — 84295 ASSAY OF SERUM SODIUM: CPT

## 2023-06-09 PROCEDURE — 83735 ASSAY OF MAGNESIUM: CPT

## 2023-06-09 PROCEDURE — 6360000002 HC RX W HCPCS: Performed by: STUDENT IN AN ORGANIZED HEALTH CARE EDUCATION/TRAINING PROGRAM

## 2023-06-09 PROCEDURE — 80053 COMPREHEN METABOLIC PANEL: CPT

## 2023-06-09 PROCEDURE — 97530 THERAPEUTIC ACTIVITIES: CPT

## 2023-06-09 PROCEDURE — C9113 INJ PANTOPRAZOLE SODIUM, VIA: HCPCS | Performed by: STUDENT IN AN ORGANIZED HEALTH CARE EDUCATION/TRAINING PROGRAM

## 2023-06-09 PROCEDURE — 2580000003 HC RX 258: Performed by: INTERNAL MEDICINE

## 2023-06-09 PROCEDURE — 2000000000 HC ICU R&B

## 2023-06-09 RX ORDER — SODIUM CHLORIDE 9 MG/ML
INJECTION, SOLUTION INTRAVENOUS CONTINUOUS
Status: DISCONTINUED | OUTPATIENT
Start: 2023-06-09 | End: 2023-06-09

## 2023-06-09 RX ADMIN — SODIUM CHLORIDE: 9 INJECTION, SOLUTION INTRAVENOUS at 06:46

## 2023-06-09 RX ADMIN — PANTOPRAZOLE SODIUM 8 MG/HR: 40 INJECTION, POWDER, FOR SOLUTION INTRAVENOUS at 12:50

## 2023-06-09 RX ADMIN — ACETAMINOPHEN 650 MG: 325 TABLET ORAL at 11:10

## 2023-06-09 RX ADMIN — ATORVASTATIN CALCIUM 10 MG: 10 TABLET, FILM COATED ORAL at 18:55

## 2023-06-09 RX ADMIN — Medication 10 ML: at 09:00

## 2023-06-09 RX ADMIN — PANTOPRAZOLE SODIUM 8 MG/HR: 40 INJECTION, POWDER, FOR SOLUTION INTRAVENOUS at 23:28

## 2023-06-09 RX ADMIN — PANTOPRAZOLE SODIUM 8 MG/HR: 40 INJECTION, POWDER, FOR SOLUTION INTRAVENOUS at 01:26

## 2023-06-09 ASSESSMENT — PAIN DESCRIPTION - LOCATION: LOCATION: HEAD

## 2023-06-09 ASSESSMENT — PAIN SCALES - GENERAL
PAINLEVEL_OUTOF10: 0
PAINLEVEL_OUTOF10: 0

## 2023-06-09 ASSESSMENT — PAIN - FUNCTIONAL ASSESSMENT: PAIN_FUNCTIONAL_ASSESSMENT: ACTIVITIES ARE NOT PREVENTED

## 2023-06-10 ENCOUNTER — APPOINTMENT (OUTPATIENT)
Dept: ULTRASOUND IMAGING | Age: 88
DRG: 643 | End: 2023-06-10
Payer: MEDICARE

## 2023-06-10 LAB
ALBUMIN SERPL-MCNC: 2.7 G/DL (ref 3.4–5)
ALBUMIN/GLOB SERPL: 1.4 {RATIO} (ref 1.1–2.2)
ALP SERPL-CCNC: 91 U/L (ref 40–129)
ALT SERPL-CCNC: 11 U/L (ref 10–40)
ANION GAP SERPL CALCULATED.3IONS-SCNC: 6 MMOL/L (ref 3–16)
AST SERPL-CCNC: 22 U/L (ref 15–37)
BASOPHILS # BLD: 0 K/UL (ref 0–0.2)
BASOPHILS NFR BLD: 0.5 %
BILIRUB SERPL-MCNC: 0.7 MG/DL (ref 0–1)
BUN SERPL-MCNC: 20 MG/DL (ref 7–20)
CALCIUM SERPL-MCNC: 8.1 MG/DL (ref 8.3–10.6)
CHLORIDE SERPL-SCNC: 92 MMOL/L (ref 99–110)
CO2 SERPL-SCNC: 21 MMOL/L (ref 21–32)
CREAT SERPL-MCNC: 1.8 MG/DL (ref 0.8–1.3)
DEPRECATED RDW RBC AUTO: 16.1 % (ref 12.4–15.4)
EOSINOPHIL # BLD: 0.1 K/UL (ref 0–0.6)
EOSINOPHIL NFR BLD: 1.2 %
GFR SERPLBLD CREATININE-BSD FMLA CKD-EPI: 35 ML/MIN/{1.73_M2}
GLUCOSE BLD-MCNC: 125 MG/DL (ref 70–99)
GLUCOSE BLD-MCNC: 84 MG/DL (ref 70–99)
GLUCOSE BLD-MCNC: 89 MG/DL (ref 70–99)
GLUCOSE BLD-MCNC: 99 MG/DL (ref 70–99)
GLUCOSE SERPL-MCNC: 90 MG/DL (ref 70–99)
HCT VFR BLD AUTO: 24.3 % (ref 40.5–52.5)
HGB BLD-MCNC: 8.1 G/DL (ref 13.5–17.5)
LYMPHOCYTES # BLD: 0.8 K/UL (ref 1–5.1)
LYMPHOCYTES NFR BLD: 12.9 %
MAGNESIUM SERPL-MCNC: 2.3 MG/DL (ref 1.8–2.4)
MCH RBC QN AUTO: 27.5 PG (ref 26–34)
MCHC RBC AUTO-ENTMCNC: 33.1 G/DL (ref 31–36)
MCV RBC AUTO: 83.1 FL (ref 80–100)
MONOCYTES # BLD: 0.8 K/UL (ref 0–1.3)
MONOCYTES NFR BLD: 12.6 %
NEUTROPHILS # BLD: 4.8 K/UL (ref 1.7–7.7)
NEUTROPHILS NFR BLD: 72.8 %
PERFORMED ON: ABNORMAL
PERFORMED ON: NORMAL
PHOSPHATE SERPL-MCNC: 2.6 MG/DL (ref 2.5–4.9)
PLATELET # BLD AUTO: 108 K/UL (ref 135–450)
PMV BLD AUTO: 8.5 FL (ref 5–10.5)
POTASSIUM SERPL-SCNC: 4.8 MMOL/L (ref 3.5–5.1)
POTASSIUM SERPL-SCNC: 4.8 MMOL/L (ref 3.5–5.1)
PROT SERPL-MCNC: 4.6 G/DL (ref 6.4–8.2)
RBC # BLD AUTO: 2.93 M/UL (ref 4.2–5.9)
SODIUM SERPL-SCNC: 119 MMOL/L (ref 136–145)
SODIUM SERPL-SCNC: 122 MMOL/L (ref 136–145)
SODIUM SERPL-SCNC: 123 MMOL/L (ref 136–145)
SODIUM SERPL-SCNC: 125 MMOL/L (ref 136–145)
WBC # BLD AUTO: 6.6 K/UL (ref 4–11)

## 2023-06-10 PROCEDURE — 36415 COLL VENOUS BLD VENIPUNCTURE: CPT

## 2023-06-10 PROCEDURE — 6370000000 HC RX 637 (ALT 250 FOR IP): Performed by: INTERNAL MEDICINE

## 2023-06-10 PROCEDURE — 2580000003 HC RX 258: Performed by: STUDENT IN AN ORGANIZED HEALTH CARE EDUCATION/TRAINING PROGRAM

## 2023-06-10 PROCEDURE — 92523 SPEECH SOUND LANG COMPREHEN: CPT

## 2023-06-10 PROCEDURE — 92526 ORAL FUNCTION THERAPY: CPT

## 2023-06-10 PROCEDURE — 84295 ASSAY OF SERUM SODIUM: CPT

## 2023-06-10 PROCEDURE — 80053 COMPREHEN METABOLIC PANEL: CPT

## 2023-06-10 PROCEDURE — 83735 ASSAY OF MAGNESIUM: CPT

## 2023-06-10 PROCEDURE — C9113 INJ PANTOPRAZOLE SODIUM, VIA: HCPCS | Performed by: INTERNAL MEDICINE

## 2023-06-10 PROCEDURE — 6360000002 HC RX W HCPCS: Performed by: INTERNAL MEDICINE

## 2023-06-10 PROCEDURE — 6370000000 HC RX 637 (ALT 250 FOR IP): Performed by: STUDENT IN AN ORGANIZED HEALTH CARE EDUCATION/TRAINING PROGRAM

## 2023-06-10 PROCEDURE — 76705 ECHO EXAM OF ABDOMEN: CPT

## 2023-06-10 PROCEDURE — 85025 COMPLETE CBC W/AUTO DIFF WBC: CPT

## 2023-06-10 PROCEDURE — 92610 EVALUATE SWALLOWING FUNCTION: CPT

## 2023-06-10 PROCEDURE — 2000000000 HC ICU R&B

## 2023-06-10 RX ORDER — LORAZEPAM 1 MG/1
1 TABLET ORAL NIGHTLY PRN
Status: DISCONTINUED | OUTPATIENT
Start: 2023-06-10 | End: 2023-06-19 | Stop reason: HOSPADM

## 2023-06-10 RX ORDER — PANTOPRAZOLE SODIUM 40 MG/10ML
40 INJECTION, POWDER, LYOPHILIZED, FOR SOLUTION INTRAVENOUS DAILY
Status: DISCONTINUED | OUTPATIENT
Start: 2023-06-10 | End: 2023-06-19 | Stop reason: HOSPADM

## 2023-06-10 RX ADMIN — LORAZEPAM 1 MG: 1 TABLET ORAL at 21:46

## 2023-06-10 RX ADMIN — Medication 10 ML: at 08:03

## 2023-06-10 RX ADMIN — LORAZEPAM 1 MG: 1 TABLET ORAL at 00:20

## 2023-06-10 RX ADMIN — PANTOPRAZOLE SODIUM 40 MG: 40 INJECTION, POWDER, FOR SOLUTION INTRAVENOUS at 11:13

## 2023-06-10 RX ADMIN — ATORVASTATIN CALCIUM 10 MG: 10 TABLET, FILM COATED ORAL at 20:24

## 2023-06-11 LAB
ALBUMIN SERPL-MCNC: 2.9 G/DL (ref 3.4–5)
ALBUMIN/GLOB SERPL: 1.3 {RATIO} (ref 1.1–2.2)
ALP SERPL-CCNC: 102 U/L (ref 40–129)
ALT SERPL-CCNC: 12 U/L (ref 10–40)
ANION GAP SERPL CALCULATED.3IONS-SCNC: 8 MMOL/L (ref 3–16)
AST SERPL-CCNC: 24 U/L (ref 15–37)
BASOPHILS # BLD: 0 K/UL (ref 0–0.2)
BASOPHILS NFR BLD: 0.5 %
BILIRUB SERPL-MCNC: 0.8 MG/DL (ref 0–1)
BUN SERPL-MCNC: 18 MG/DL (ref 7–20)
CALCIUM SERPL-MCNC: 8.7 MG/DL (ref 8.3–10.6)
CHLORIDE SERPL-SCNC: 100 MMOL/L (ref 99–110)
CO2 SERPL-SCNC: 21 MMOL/L (ref 21–32)
CREAT SERPL-MCNC: 1.6 MG/DL (ref 0.8–1.3)
DEPRECATED RDW RBC AUTO: 16.7 % (ref 12.4–15.4)
EOSINOPHIL # BLD: 0.1 K/UL (ref 0–0.6)
EOSINOPHIL NFR BLD: 1.2 %
GFR SERPLBLD CREATININE-BSD FMLA CKD-EPI: 41 ML/MIN/{1.73_M2}
GLUCOSE BLD-MCNC: 103 MG/DL (ref 70–99)
GLUCOSE BLD-MCNC: 117 MG/DL (ref 70–99)
GLUCOSE BLD-MCNC: 133 MG/DL (ref 70–99)
GLUCOSE BLD-MCNC: 159 MG/DL (ref 70–99)
GLUCOSE SERPL-MCNC: 90 MG/DL (ref 70–99)
HCT VFR BLD AUTO: 27.1 % (ref 40.5–52.5)
HGB BLD-MCNC: 8.9 G/DL (ref 13.5–17.5)
LYMPHOCYTES # BLD: 0.7 K/UL (ref 1–5.1)
LYMPHOCYTES NFR BLD: 10.3 %
MCH RBC QN AUTO: 27.8 PG (ref 26–34)
MCHC RBC AUTO-ENTMCNC: 33 G/DL (ref 31–36)
MCV RBC AUTO: 84.2 FL (ref 80–100)
MONOCYTES # BLD: 0.7 K/UL (ref 0–1.3)
MONOCYTES NFR BLD: 10.5 %
NEUTROPHILS # BLD: 5.4 K/UL (ref 1.7–7.7)
NEUTROPHILS NFR BLD: 77.5 %
PERFORMED ON: ABNORMAL
PHOSPHATE SERPL-MCNC: 2.6 MG/DL (ref 2.5–4.9)
PLATELET # BLD AUTO: 123 K/UL (ref 135–450)
PMV BLD AUTO: 9.2 FL (ref 5–10.5)
POTASSIUM SERPL-SCNC: 5.2 MMOL/L (ref 3.5–5.1)
PROT SERPL-MCNC: 5.2 G/DL (ref 6.4–8.2)
RBC # BLD AUTO: 3.22 M/UL (ref 4.2–5.9)
SODIUM SERPL-SCNC: 129 MMOL/L (ref 136–145)
WBC # BLD AUTO: 7 K/UL (ref 4–11)

## 2023-06-11 PROCEDURE — 84100 ASSAY OF PHOSPHORUS: CPT

## 2023-06-11 PROCEDURE — 36415 COLL VENOUS BLD VENIPUNCTURE: CPT

## 2023-06-11 PROCEDURE — 6360000002 HC RX W HCPCS: Performed by: INTERNAL MEDICINE

## 2023-06-11 PROCEDURE — C9113 INJ PANTOPRAZOLE SODIUM, VIA: HCPCS | Performed by: INTERNAL MEDICINE

## 2023-06-11 PROCEDURE — 2580000003 HC RX 258: Performed by: STUDENT IN AN ORGANIZED HEALTH CARE EDUCATION/TRAINING PROGRAM

## 2023-06-11 PROCEDURE — 85025 COMPLETE CBC W/AUTO DIFF WBC: CPT

## 2023-06-11 PROCEDURE — 80053 COMPREHEN METABOLIC PANEL: CPT

## 2023-06-11 PROCEDURE — 2000000000 HC ICU R&B

## 2023-06-11 RX ADMIN — PANTOPRAZOLE SODIUM 40 MG: 40 INJECTION, POWDER, FOR SOLUTION INTRAVENOUS at 09:32

## 2023-06-11 RX ADMIN — Medication 10 ML: at 20:25

## 2023-06-11 RX ADMIN — Medication 10 ML: at 09:32

## 2023-06-11 ASSESSMENT — PAIN SCALES - GENERAL
PAINLEVEL_OUTOF10: 0
PAINLEVEL_OUTOF10: 0

## 2023-06-12 LAB
ALBUMIN SERPL-MCNC: 2.5 G/DL (ref 3.4–5)
ALBUMIN/GLOB SERPL: 1.1 {RATIO} (ref 1.1–2.2)
ALP SERPL-CCNC: 103 U/L (ref 40–129)
ALT SERPL-CCNC: 15 U/L (ref 10–40)
ANION GAP SERPL CALCULATED.3IONS-SCNC: 6 MMOL/L (ref 3–16)
AST SERPL-CCNC: 33 U/L (ref 15–37)
BASOPHILS # BLD: 0.1 K/UL (ref 0–0.2)
BASOPHILS NFR BLD: 0.7 %
BILIRUB SERPL-MCNC: 0.8 MG/DL (ref 0–1)
BUN SERPL-MCNC: 17 MG/DL (ref 7–20)
CALCIUM SERPL-MCNC: 8.9 MG/DL (ref 8.3–10.6)
CHLORIDE SERPL-SCNC: 100 MMOL/L (ref 99–110)
CO2 SERPL-SCNC: 24 MMOL/L (ref 21–32)
CREAT SERPL-MCNC: 1.5 MG/DL (ref 0.8–1.3)
DEPRECATED RDW RBC AUTO: 16.4 % (ref 12.4–15.4)
EOSINOPHIL # BLD: 0.1 K/UL (ref 0–0.6)
EOSINOPHIL NFR BLD: 1.3 %
GFR SERPLBLD CREATININE-BSD FMLA CKD-EPI: 44 ML/MIN/{1.73_M2}
GLUCOSE BLD-MCNC: 126 MG/DL (ref 70–99)
GLUCOSE BLD-MCNC: 134 MG/DL (ref 70–99)
GLUCOSE BLD-MCNC: 140 MG/DL (ref 70–99)
GLUCOSE BLD-MCNC: 147 MG/DL (ref 70–99)
GLUCOSE SERPL-MCNC: 98 MG/DL (ref 70–99)
HCT VFR BLD AUTO: 25.5 % (ref 40.5–52.5)
HGB BLD-MCNC: 8.5 G/DL (ref 13.5–17.5)
LYMPHOCYTES # BLD: 1.1 K/UL (ref 1–5.1)
LYMPHOCYTES NFR BLD: 13.8 %
MCH RBC QN AUTO: 27.6 PG (ref 26–34)
MCHC RBC AUTO-ENTMCNC: 33.2 G/DL (ref 31–36)
MCV RBC AUTO: 83.3 FL (ref 80–100)
MONOCYTES # BLD: 0.9 K/UL (ref 0–1.3)
MONOCYTES NFR BLD: 11.6 %
NEUTROPHILS # BLD: 5.6 K/UL (ref 1.7–7.7)
NEUTROPHILS NFR BLD: 72.6 %
PERFORMED ON: ABNORMAL
PHOSPHATE SERPL-MCNC: 2.6 MG/DL (ref 2.5–4.9)
PLATELET # BLD AUTO: 114 K/UL (ref 135–450)
PMV BLD AUTO: 9.5 FL (ref 5–10.5)
POTASSIUM SERPL-SCNC: 5.2 MMOL/L (ref 3.5–5.1)
POTASSIUM SERPL-SCNC: 5.2 MMOL/L (ref 3.5–5.1)
PROT PATTERN UR ELPH-IMP: NORMAL
PROT SERPL-MCNC: 4.7 G/DL (ref 6.4–8.2)
RBC # BLD AUTO: 3.06 M/UL (ref 4.2–5.9)
SODIUM SERPL-SCNC: 130 MMOL/L (ref 136–145)
WBC # BLD AUTO: 7.7 K/UL (ref 4–11)

## 2023-06-12 PROCEDURE — 2580000003 HC RX 258: Performed by: STUDENT IN AN ORGANIZED HEALTH CARE EDUCATION/TRAINING PROGRAM

## 2023-06-12 PROCEDURE — 6360000002 HC RX W HCPCS: Performed by: INTERNAL MEDICINE

## 2023-06-12 PROCEDURE — 36415 COLL VENOUS BLD VENIPUNCTURE: CPT

## 2023-06-12 PROCEDURE — 1200000000 HC SEMI PRIVATE

## 2023-06-12 PROCEDURE — 92526 ORAL FUNCTION THERAPY: CPT

## 2023-06-12 PROCEDURE — 85025 COMPLETE CBC W/AUTO DIFF WBC: CPT

## 2023-06-12 PROCEDURE — 97530 THERAPEUTIC ACTIVITIES: CPT

## 2023-06-12 PROCEDURE — C9113 INJ PANTOPRAZOLE SODIUM, VIA: HCPCS | Performed by: INTERNAL MEDICINE

## 2023-06-12 PROCEDURE — 6370000000 HC RX 637 (ALT 250 FOR IP): Performed by: STUDENT IN AN ORGANIZED HEALTH CARE EDUCATION/TRAINING PROGRAM

## 2023-06-12 PROCEDURE — 80053 COMPREHEN METABOLIC PANEL: CPT

## 2023-06-12 PROCEDURE — 97129 THER IVNTJ 1ST 15 MIN: CPT

## 2023-06-12 PROCEDURE — 94760 N-INVAS EAR/PLS OXIMETRY 1: CPT

## 2023-06-12 RX ADMIN — Medication 10 ML: at 09:55

## 2023-06-12 RX ADMIN — ATORVASTATIN CALCIUM 10 MG: 10 TABLET, FILM COATED ORAL at 20:59

## 2023-06-12 RX ADMIN — PANTOPRAZOLE SODIUM 40 MG: 40 INJECTION, POWDER, FOR SOLUTION INTRAVENOUS at 09:55

## 2023-06-12 RX ADMIN — Medication 10 ML: at 20:59

## 2023-06-12 ASSESSMENT — PAIN SCALES - GENERAL
PAINLEVEL_OUTOF10: 0

## 2023-06-12 ASSESSMENT — PAIN SCALES - WONG BAKER: WONGBAKER_NUMERICALRESPONSE: 0

## 2023-06-13 LAB
ALBUMIN SERPL-MCNC: 3.2 G/DL (ref 3.4–5)
ALBUMIN/GLOB SERPL: 1.5 {RATIO} (ref 1.1–2.2)
ALP SERPL-CCNC: 115 U/L (ref 40–129)
ALT SERPL-CCNC: 16 U/L (ref 10–40)
ANION GAP SERPL CALCULATED.3IONS-SCNC: 10 MMOL/L (ref 3–16)
AST SERPL-CCNC: 31 U/L (ref 15–37)
BASOPHILS # BLD: 0.1 K/UL (ref 0–0.2)
BASOPHILS NFR BLD: 0.6 %
BILIRUB SERPL-MCNC: 0.9 MG/DL (ref 0–1)
BUN SERPL-MCNC: 19 MG/DL (ref 7–20)
CALCIUM SERPL-MCNC: 9 MG/DL (ref 8.3–10.6)
CHLORIDE SERPL-SCNC: 97 MMOL/L (ref 99–110)
CO2 SERPL-SCNC: 22 MMOL/L (ref 21–32)
CREAT SERPL-MCNC: 1.3 MG/DL (ref 0.8–1.3)
DEPRECATED RDW RBC AUTO: 16.7 % (ref 12.4–15.4)
EOSINOPHIL # BLD: 0.2 K/UL (ref 0–0.6)
EOSINOPHIL NFR BLD: 1.6 %
GFR SERPLBLD CREATININE-BSD FMLA CKD-EPI: 52 ML/MIN/{1.73_M2}
GLUCOSE BLD-MCNC: 114 MG/DL (ref 70–99)
GLUCOSE BLD-MCNC: 116 MG/DL (ref 70–99)
GLUCOSE BLD-MCNC: 139 MG/DL (ref 70–99)
GLUCOSE BLD-MCNC: 153 MG/DL (ref 70–99)
GLUCOSE SERPL-MCNC: 112 MG/DL (ref 70–99)
HCT VFR BLD AUTO: 29.9 % (ref 40.5–52.5)
HGB BLD-MCNC: 9.9 G/DL (ref 13.5–17.5)
LACTATE BLDV-SCNC: 1.2 MMOL/L (ref 0.4–2)
LYMPHOCYTES # BLD: 1.2 K/UL (ref 1–5.1)
LYMPHOCYTES NFR BLD: 10.7 %
MCH RBC QN AUTO: 28.1 PG (ref 26–34)
MCHC RBC AUTO-ENTMCNC: 33.2 G/DL (ref 31–36)
MCV RBC AUTO: 84.6 FL (ref 80–100)
MONOCYTES # BLD: 1.2 K/UL (ref 0–1.3)
MONOCYTES NFR BLD: 11.3 %
NEUTROPHILS # BLD: 8.2 K/UL (ref 1.7–7.7)
NEUTROPHILS NFR BLD: 75.8 %
PERFORMED ON: ABNORMAL
PLATELET # BLD AUTO: 132 K/UL (ref 135–450)
PMV BLD AUTO: 9.5 FL (ref 5–10.5)
POTASSIUM SERPL-SCNC: 5.4 MMOL/L (ref 3.5–5.1)
PROT SERPL-MCNC: 5.4 G/DL (ref 6.4–8.2)
RBC # BLD AUTO: 3.54 M/UL (ref 4.2–5.9)
SODIUM SERPL-SCNC: 129 MMOL/L (ref 136–145)
WBC # BLD AUTO: 10.8 K/UL (ref 4–11)

## 2023-06-13 PROCEDURE — 97530 THERAPEUTIC ACTIVITIES: CPT

## 2023-06-13 PROCEDURE — 97110 THERAPEUTIC EXERCISES: CPT

## 2023-06-13 PROCEDURE — 36415 COLL VENOUS BLD VENIPUNCTURE: CPT

## 2023-06-13 PROCEDURE — 2580000003 HC RX 258: Performed by: INTERNAL MEDICINE

## 2023-06-13 PROCEDURE — 6370000000 HC RX 637 (ALT 250 FOR IP): Performed by: STUDENT IN AN ORGANIZED HEALTH CARE EDUCATION/TRAINING PROGRAM

## 2023-06-13 PROCEDURE — 97116 GAIT TRAINING THERAPY: CPT

## 2023-06-13 PROCEDURE — C9113 INJ PANTOPRAZOLE SODIUM, VIA: HCPCS | Performed by: INTERNAL MEDICINE

## 2023-06-13 PROCEDURE — 2580000003 HC RX 258: Performed by: STUDENT IN AN ORGANIZED HEALTH CARE EDUCATION/TRAINING PROGRAM

## 2023-06-13 PROCEDURE — 6370000000 HC RX 637 (ALT 250 FOR IP): Performed by: INTERNAL MEDICINE

## 2023-06-13 PROCEDURE — 80053 COMPREHEN METABOLIC PANEL: CPT

## 2023-06-13 PROCEDURE — 1200000000 HC SEMI PRIVATE

## 2023-06-13 PROCEDURE — 85025 COMPLETE CBC W/AUTO DIFF WBC: CPT

## 2023-06-13 PROCEDURE — 6360000002 HC RX W HCPCS: Performed by: INTERNAL MEDICINE

## 2023-06-13 PROCEDURE — 83605 ASSAY OF LACTIC ACID: CPT

## 2023-06-13 RX ORDER — 0.9 % SODIUM CHLORIDE 0.9 %
500 INTRAVENOUS SOLUTION INTRAVENOUS ONCE
Status: COMPLETED | OUTPATIENT
Start: 2023-06-13 | End: 2023-06-13

## 2023-06-13 RX ORDER — METOPROLOL SUCCINATE 25 MG/1
12.5 TABLET, EXTENDED RELEASE ORAL DAILY
Status: CANCELLED | OUTPATIENT
Start: 2023-06-13

## 2023-06-13 RX ADMIN — ATORVASTATIN CALCIUM 10 MG: 10 TABLET, FILM COATED ORAL at 17:38

## 2023-06-13 RX ADMIN — Medication 10 ML: at 11:52

## 2023-06-13 RX ADMIN — Medication 15 G: at 17:38

## 2023-06-13 RX ADMIN — PANTOPRAZOLE SODIUM 40 MG: 40 INJECTION, POWDER, FOR SOLUTION INTRAVENOUS at 11:52

## 2023-06-13 RX ADMIN — SODIUM CHLORIDE 500 ML: 9 INJECTION, SOLUTION INTRAVENOUS at 18:07

## 2023-06-13 RX ADMIN — LORAZEPAM 1 MG: 1 TABLET ORAL at 23:22

## 2023-06-13 ASSESSMENT — PAIN SCALES - GENERAL
PAINLEVEL_OUTOF10: 0

## 2023-06-13 ASSESSMENT — PAIN SCALES - WONG BAKER
WONGBAKER_NUMERICALRESPONSE: 4
WONGBAKER_NUMERICALRESPONSE: 0
WONGBAKER_NUMERICALRESPONSE: 4

## 2023-06-13 ASSESSMENT — PAIN DESCRIPTION - DESCRIPTORS: DESCRIPTORS: ACHING

## 2023-06-13 ASSESSMENT — PAIN DESCRIPTION - ORIENTATION: ORIENTATION: RIGHT

## 2023-06-13 ASSESSMENT — PAIN DESCRIPTION - LOCATION: LOCATION: ABDOMEN

## 2023-06-14 LAB
ALBUMIN SERPL-MCNC: 2.8 G/DL (ref 3.4–5)
ALBUMIN/GLOB SERPL: 1.1 {RATIO} (ref 1.1–2.2)
ALP SERPL-CCNC: 112 U/L (ref 40–129)
ALT SERPL-CCNC: 15 U/L (ref 10–40)
ANION GAP SERPL CALCULATED.3IONS-SCNC: 8 MMOL/L (ref 3–16)
ANION GAP SERPL CALCULATED.3IONS-SCNC: 9 MMOL/L (ref 3–16)
AST SERPL-CCNC: 22 U/L (ref 15–37)
BASOPHILS # BLD: 0 K/UL (ref 0–0.2)
BASOPHILS NFR BLD: 0.3 %
BILIRUB SERPL-MCNC: 1.1 MG/DL (ref 0–1)
BUN SERPL-MCNC: 45 MG/DL (ref 7–20)
BUN SERPL-MCNC: 63 MG/DL (ref 7–20)
CALCIUM SERPL-MCNC: 9.2 MG/DL (ref 8.3–10.6)
CALCIUM SERPL-MCNC: 9.2 MG/DL (ref 8.3–10.6)
CHLORIDE SERPL-SCNC: 97 MMOL/L (ref 99–110)
CHLORIDE SERPL-SCNC: 99 MMOL/L (ref 99–110)
CO2 SERPL-SCNC: 23 MMOL/L (ref 21–32)
CO2 SERPL-SCNC: 23 MMOL/L (ref 21–32)
CREAT SERPL-MCNC: 2 MG/DL (ref 0.8–1.3)
CREAT SERPL-MCNC: 2.4 MG/DL (ref 0.8–1.3)
DEPRECATED RDW RBC AUTO: 17.3 % (ref 12.4–15.4)
EOSINOPHIL # BLD: 0.1 K/UL (ref 0–0.6)
EOSINOPHIL NFR BLD: 1 %
GFR SERPLBLD CREATININE-BSD FMLA CKD-EPI: 25 ML/MIN/{1.73_M2}
GFR SERPLBLD CREATININE-BSD FMLA CKD-EPI: 31 ML/MIN/{1.73_M2}
GLUCOSE BLD-MCNC: 100 MG/DL (ref 70–99)
GLUCOSE BLD-MCNC: 101 MG/DL (ref 70–99)
GLUCOSE BLD-MCNC: 153 MG/DL (ref 70–99)
GLUCOSE BLD-MCNC: 179 MG/DL (ref 70–99)
GLUCOSE SERPL-MCNC: 107 MG/DL (ref 70–99)
GLUCOSE SERPL-MCNC: 129 MG/DL (ref 70–99)
HCT VFR BLD AUTO: 31.3 % (ref 40.5–52.5)
HGB BLD-MCNC: 10 G/DL (ref 13.5–17.5)
LYMPHOCYTES # BLD: 0.8 K/UL (ref 1–5.1)
LYMPHOCYTES NFR BLD: 5.9 %
MCH RBC QN AUTO: 27.5 PG (ref 26–34)
MCHC RBC AUTO-ENTMCNC: 32.1 G/DL (ref 31–36)
MCV RBC AUTO: 85.7 FL (ref 80–100)
MONOCYTES # BLD: 1.5 K/UL (ref 0–1.3)
MONOCYTES NFR BLD: 11.9 %
NEUTROPHILS # BLD: 10.3 K/UL (ref 1.7–7.7)
NEUTROPHILS NFR BLD: 80.9 %
PERFORMED ON: ABNORMAL
PLATELET # BLD AUTO: 143 K/UL (ref 135–450)
PMV BLD AUTO: 9.6 FL (ref 5–10.5)
POTASSIUM SERPL-SCNC: 5.7 MMOL/L (ref 3.5–5.1)
POTASSIUM SERPL-SCNC: 6.3 MMOL/L (ref 3.5–5.1)
PROT SERPL-MCNC: 5.3 G/DL (ref 6.4–8.2)
RBC # BLD AUTO: 3.65 M/UL (ref 4.2–5.9)
REASON FOR REJECTION: NORMAL
REJECTED TEST: NORMAL
SODIUM SERPL-SCNC: 129 MMOL/L (ref 136–145)
SODIUM SERPL-SCNC: 130 MMOL/L (ref 136–145)
WBC # BLD AUTO: 12.8 K/UL (ref 4–11)

## 2023-06-14 PROCEDURE — 6360000002 HC RX W HCPCS: Performed by: INTERNAL MEDICINE

## 2023-06-14 PROCEDURE — 2580000003 HC RX 258: Performed by: STUDENT IN AN ORGANIZED HEALTH CARE EDUCATION/TRAINING PROGRAM

## 2023-06-14 PROCEDURE — 51798 US URINE CAPACITY MEASURE: CPT

## 2023-06-14 PROCEDURE — 2580000003 HC RX 258: Performed by: INTERNAL MEDICINE

## 2023-06-14 PROCEDURE — 2500000003 HC RX 250 WO HCPCS: Performed by: HOSPITALIST

## 2023-06-14 PROCEDURE — 85025 COMPLETE CBC W/AUTO DIFF WBC: CPT

## 2023-06-14 PROCEDURE — 82570 ASSAY OF URINE CREATININE: CPT

## 2023-06-14 PROCEDURE — 6370000000 HC RX 637 (ALT 250 FOR IP): Performed by: STUDENT IN AN ORGANIZED HEALTH CARE EDUCATION/TRAINING PROGRAM

## 2023-06-14 PROCEDURE — 97530 THERAPEUTIC ACTIVITIES: CPT

## 2023-06-14 PROCEDURE — 80053 COMPREHEN METABOLIC PANEL: CPT

## 2023-06-14 PROCEDURE — 6370000000 HC RX 637 (ALT 250 FOR IP): Performed by: INTERNAL MEDICINE

## 2023-06-14 PROCEDURE — 84300 ASSAY OF URINE SODIUM: CPT

## 2023-06-14 PROCEDURE — 36415 COLL VENOUS BLD VENIPUNCTURE: CPT

## 2023-06-14 PROCEDURE — 1200000000 HC SEMI PRIVATE

## 2023-06-14 PROCEDURE — 6370000000 HC RX 637 (ALT 250 FOR IP): Performed by: PEDIATRICS

## 2023-06-14 PROCEDURE — C9113 INJ PANTOPRAZOLE SODIUM, VIA: HCPCS | Performed by: INTERNAL MEDICINE

## 2023-06-14 RX ORDER — SODIUM CHLORIDE 9 MG/ML
INJECTION, SOLUTION INTRAVENOUS CONTINUOUS
Status: DISCONTINUED | OUTPATIENT
Start: 2023-06-14 | End: 2023-06-15

## 2023-06-14 RX ADMIN — SODIUM CHLORIDE: 9 INJECTION, SOLUTION INTRAVENOUS at 21:20

## 2023-06-14 RX ADMIN — PANTOPRAZOLE SODIUM 40 MG: 40 INJECTION, POWDER, FOR SOLUTION INTRAVENOUS at 10:19

## 2023-06-14 RX ADMIN — SODIUM BICARBONATE 50 MEQ: 84 INJECTION, SOLUTION INTRAVENOUS at 12:04

## 2023-06-14 RX ADMIN — SODIUM ZIRCONIUM CYCLOSILICATE 10 G: 10 POWDER, FOR SUSPENSION ORAL at 18:31

## 2023-06-14 RX ADMIN — Medication 15 G: at 10:19

## 2023-06-14 RX ADMIN — Medication 10 ML: at 10:20

## 2023-06-14 RX ADMIN — SODIUM ZIRCONIUM CYCLOSILICATE 10 G: 10 POWDER, FOR SUSPENSION ORAL at 12:03

## 2023-06-14 RX ADMIN — Medication 10 ML: at 21:18

## 2023-06-14 RX ADMIN — ATORVASTATIN CALCIUM 10 MG: 10 TABLET, FILM COATED ORAL at 18:31

## 2023-06-14 RX ADMIN — METOPROLOL SUCCINATE 12.5 MG: 25 TABLET, EXTENDED RELEASE ORAL at 18:31

## 2023-06-14 ASSESSMENT — PAIN SCALES - WONG BAKER
WONGBAKER_NUMERICALRESPONSE: 0
WONGBAKER_NUMERICALRESPONSE: 2

## 2023-06-14 ASSESSMENT — PAIN DESCRIPTION - LOCATION: LOCATION: ABDOMEN

## 2023-06-14 ASSESSMENT — PAIN SCALES - GENERAL
PAINLEVEL_OUTOF10: 0
PAINLEVEL_OUTOF10: 0

## 2023-06-15 LAB
ANION GAP SERPL CALCULATED.3IONS-SCNC: 4 MMOL/L (ref 3–16)
ANISOCYTOSIS BLD QL SMEAR: ABNORMAL
BASOPHILS # BLD: 0 K/UL (ref 0–0.2)
BASOPHILS NFR BLD: 0 %
BUN SERPL-MCNC: 71 MG/DL (ref 7–20)
BURR CELLS BLD QL SMEAR: ABNORMAL
CALCIUM SERPL-MCNC: 9 MG/DL (ref 8.3–10.6)
CHLORIDE SERPL-SCNC: 100 MMOL/L (ref 99–110)
CO2 SERPL-SCNC: 27 MMOL/L (ref 21–32)
CREAT SERPL-MCNC: 2.5 MG/DL (ref 0.8–1.3)
CREAT UR-MCNC: 97.4 MG/DL (ref 39–259)
DEPRECATED RDW RBC AUTO: 17.3 % (ref 12.4–15.4)
EOSINOPHIL # BLD: 0.4 K/UL (ref 0–0.6)
EOSINOPHIL NFR BLD: 3 %
GFR SERPLBLD CREATININE-BSD FMLA CKD-EPI: 24 ML/MIN/{1.73_M2}
GLUCOSE BLD-MCNC: 101 MG/DL (ref 70–99)
GLUCOSE BLD-MCNC: 192 MG/DL (ref 70–99)
GLUCOSE BLD-MCNC: 92 MG/DL (ref 70–99)
GLUCOSE BLD-MCNC: 97 MG/DL (ref 70–99)
GLUCOSE SERPL-MCNC: 105 MG/DL (ref 70–99)
HCT VFR BLD AUTO: 28.3 % (ref 40.5–52.5)
HGB BLD-MCNC: 9.1 G/DL (ref 13.5–17.5)
LYMPHOCYTES # BLD: 1.7 K/UL (ref 1–5.1)
LYMPHOCYTES NFR BLD: 13 %
MCH RBC QN AUTO: 27.2 PG (ref 26–34)
MCHC RBC AUTO-ENTMCNC: 32 G/DL (ref 31–36)
MCV RBC AUTO: 85 FL (ref 80–100)
MONOCYTES # BLD: 0.5 K/UL (ref 0–1.3)
MONOCYTES NFR BLD: 4 %
NEUTROPHILS # BLD: 10.7 K/UL (ref 1.7–7.7)
NEUTROPHILS NFR BLD: 80 %
OVALOCYTES BLD QL SMEAR: ABNORMAL
PERFORMED ON: ABNORMAL
PERFORMED ON: ABNORMAL
PERFORMED ON: NORMAL
PERFORMED ON: NORMAL
PLATELET # BLD AUTO: 128 K/UL (ref 135–450)
PLATELET BLD QL SMEAR: ADEQUATE
PMV BLD AUTO: 9.1 FL (ref 5–10.5)
POIKILOCYTOSIS BLD QL SMEAR: ABNORMAL
POTASSIUM SERPL-SCNC: 5.3 MMOL/L (ref 3.5–5.1)
RBC # BLD AUTO: 3.33 M/UL (ref 4.2–5.9)
SLIDE REVIEW: ABNORMAL
SODIUM SERPL-SCNC: 131 MMOL/L (ref 136–145)
SODIUM UR-SCNC: 31 MMOL/L
WBC # BLD AUTO: 13.4 K/UL (ref 4–11)

## 2023-06-15 PROCEDURE — 1200000000 HC SEMI PRIVATE

## 2023-06-15 PROCEDURE — C9113 INJ PANTOPRAZOLE SODIUM, VIA: HCPCS | Performed by: INTERNAL MEDICINE

## 2023-06-15 PROCEDURE — 97129 THER IVNTJ 1ST 15 MIN: CPT

## 2023-06-15 PROCEDURE — 85025 COMPLETE CBC W/AUTO DIFF WBC: CPT

## 2023-06-15 PROCEDURE — 2580000003 HC RX 258: Performed by: STUDENT IN AN ORGANIZED HEALTH CARE EDUCATION/TRAINING PROGRAM

## 2023-06-15 PROCEDURE — 6370000000 HC RX 637 (ALT 250 FOR IP): Performed by: STUDENT IN AN ORGANIZED HEALTH CARE EDUCATION/TRAINING PROGRAM

## 2023-06-15 PROCEDURE — 92526 ORAL FUNCTION THERAPY: CPT

## 2023-06-15 PROCEDURE — 6360000002 HC RX W HCPCS: Performed by: INTERNAL MEDICINE

## 2023-06-15 PROCEDURE — 6370000000 HC RX 637 (ALT 250 FOR IP): Performed by: INTERNAL MEDICINE

## 2023-06-15 PROCEDURE — 36415 COLL VENOUS BLD VENIPUNCTURE: CPT

## 2023-06-15 PROCEDURE — 80048 BASIC METABOLIC PNL TOTAL CA: CPT

## 2023-06-15 PROCEDURE — 2580000003 HC RX 258: Performed by: INTERNAL MEDICINE

## 2023-06-15 RX ORDER — DEXTROSE, SODIUM CHLORIDE, SODIUM LACTATE, POTASSIUM CHLORIDE, AND CALCIUM CHLORIDE 5; .6; .31; .03; .02 G/100ML; G/100ML; G/100ML; G/100ML; G/100ML
INJECTION, SOLUTION INTRAVENOUS CONTINUOUS
Status: DISCONTINUED | OUTPATIENT
Start: 2023-06-15 | End: 2023-06-16

## 2023-06-15 RX ORDER — ZIPRASIDONE MESYLATE 20 MG/ML
20 INJECTION, POWDER, LYOPHILIZED, FOR SOLUTION INTRAMUSCULAR ONCE
Status: COMPLETED | OUTPATIENT
Start: 2023-06-16 | End: 2023-06-16

## 2023-06-15 RX ORDER — DEXTROSE AND SODIUM CHLORIDE 5; .45 G/100ML; G/100ML
INJECTION, SOLUTION INTRAVENOUS CONTINUOUS
Status: DISCONTINUED | OUTPATIENT
Start: 2023-06-15 | End: 2023-06-15

## 2023-06-15 RX ADMIN — PANTOPRAZOLE SODIUM 40 MG: 40 INJECTION, POWDER, FOR SOLUTION INTRAVENOUS at 08:18

## 2023-06-15 RX ADMIN — Medication 15 G: at 08:18

## 2023-06-15 RX ADMIN — ATORVASTATIN CALCIUM 10 MG: 10 TABLET, FILM COATED ORAL at 17:30

## 2023-06-15 RX ADMIN — SODIUM ZIRCONIUM CYCLOSILICATE 10 G: 10 POWDER, FOR SUSPENSION ORAL at 08:18

## 2023-06-15 RX ADMIN — Medication 10 ML: at 21:43

## 2023-06-15 RX ADMIN — SODIUM CHLORIDE, SODIUM LACTATE, POTASSIUM CHLORIDE, CALCIUM CHLORIDE AND DEXTROSE MONOHYDRATE: 5; 600; 310; 30; 20 INJECTION, SOLUTION INTRAVENOUS at 12:23

## 2023-06-15 RX ADMIN — Medication 10 ML: at 08:18

## 2023-06-15 RX ADMIN — SODIUM CHLORIDE, PRESERVATIVE FREE 10 ML: 5 INJECTION INTRAVENOUS at 21:43

## 2023-06-15 RX ADMIN — LORAZEPAM 1 MG: 1 TABLET ORAL at 21:36

## 2023-06-15 ASSESSMENT — PAIN SCALES - GENERAL
PAINLEVEL_OUTOF10: 0

## 2023-06-15 ASSESSMENT — PAIN SCALES - WONG BAKER
WONGBAKER_NUMERICALRESPONSE: 2;0

## 2023-06-16 LAB
ALBUMIN SERPL-MCNC: 2.5 G/DL (ref 3.4–5)
ANION GAP SERPL CALCULATED.3IONS-SCNC: 9 MMOL/L (ref 3–16)
BACTERIA URNS QL MICRO: ABNORMAL /HPF
BASOPHILS # BLD: 0.1 K/UL (ref 0–0.2)
BASOPHILS NFR BLD: 1.2 %
BILIRUB UR QL STRIP.AUTO: NEGATIVE
BUN SERPL-MCNC: 72 MG/DL (ref 7–20)
CALCIUM SERPL-MCNC: 8.5 MG/DL (ref 8.3–10.6)
CHLORIDE SERPL-SCNC: 100 MMOL/L (ref 99–110)
CLARITY UR: ABNORMAL
CO2 SERPL-SCNC: 20 MMOL/L (ref 21–32)
COLOR UR: ABNORMAL
CREAT SERPL-MCNC: 2.2 MG/DL (ref 0.8–1.3)
DEPRECATED RDW RBC AUTO: 17.7 % (ref 12.4–15.4)
EKG ATRIAL RATE: 102 BPM
EKG DIAGNOSIS: NORMAL
EKG P AXIS: 68 DEGREES
EKG P-R INTERVAL: 174 MS
EKG Q-T INTERVAL: 354 MS
EKG QRS DURATION: 106 MS
EKG QTC CALCULATION (BAZETT): 461 MS
EKG R AXIS: -41 DEGREES
EKG T AXIS: -17 DEGREES
EKG VENTRICULAR RATE: 102 BPM
EOSINOPHIL # BLD: 0.3 K/UL (ref 0–0.6)
EOSINOPHIL NFR BLD: 3.7 %
EPI CELLS #/AREA URNS AUTO: 2 /HPF (ref 0–5)
GFR SERPLBLD CREATININE-BSD FMLA CKD-EPI: 28 ML/MIN/{1.73_M2}
GLUCOSE BLD-MCNC: 117 MG/DL (ref 70–99)
GLUCOSE BLD-MCNC: 124 MG/DL (ref 70–99)
GLUCOSE BLD-MCNC: 69 MG/DL (ref 70–99)
GLUCOSE BLD-MCNC: 72 MG/DL (ref 70–99)
GLUCOSE BLD-MCNC: 89 MG/DL (ref 70–99)
GLUCOSE SERPL-MCNC: 128 MG/DL (ref 70–99)
GLUCOSE UR STRIP.AUTO-MCNC: NEGATIVE MG/DL
HCT VFR BLD AUTO: 27.7 % (ref 40.5–52.5)
HGB BLD-MCNC: 9.1 G/DL (ref 13.5–17.5)
HGB UR QL STRIP.AUTO: ABNORMAL
HYALINE CASTS #/AREA URNS AUTO: 29 /LPF (ref 0–8)
KETONES UR STRIP.AUTO-MCNC: NEGATIVE MG/DL
LACTATE BLDV-SCNC: 1.3 MMOL/L (ref 0.4–2)
LEUKOCYTE ESTERASE UR QL STRIP.AUTO: ABNORMAL
LYMPHOCYTES # BLD: 0.8 K/UL (ref 1–5.1)
LYMPHOCYTES NFR BLD: 9 %
MAGNESIUM SERPL-MCNC: 2.5 MG/DL (ref 1.8–2.4)
MCH RBC QN AUTO: 28 PG (ref 26–34)
MCHC RBC AUTO-ENTMCNC: 32.8 G/DL (ref 31–36)
MCV RBC AUTO: 85.4 FL (ref 80–100)
MONOCYTES # BLD: 1 K/UL (ref 0–1.3)
MONOCYTES NFR BLD: 10.8 %
NEUTROPHILS # BLD: 7.1 K/UL (ref 1.7–7.7)
NEUTROPHILS NFR BLD: 75.3 %
NITRITE UR QL STRIP.AUTO: NEGATIVE
PERFORMED ON: ABNORMAL
PERFORMED ON: NORMAL
PERFORMED ON: NORMAL
PH UR STRIP.AUTO: 6 [PH] (ref 5–8)
PHOSPHATE SERPL-MCNC: 3.9 MG/DL (ref 2.5–4.9)
PLATELET # BLD AUTO: 120 K/UL (ref 135–450)
PMV BLD AUTO: 9.3 FL (ref 5–10.5)
POTASSIUM SERPL-SCNC: 5.5 MMOL/L (ref 3.5–5.1)
PROT UR STRIP.AUTO-MCNC: 300 MG/DL
RBC # BLD AUTO: 3.25 M/UL (ref 4.2–5.9)
RBC CLUMPS #/AREA URNS AUTO: 1699 /HPF (ref 0–4)
SODIUM SERPL-SCNC: 129 MMOL/L (ref 136–145)
SP GR UR STRIP.AUTO: 1.01 (ref 1–1.03)
TROPONIN, HIGH SENSITIVITY: 482 NG/L (ref 0–22)
UA COMPLETE W REFLEX CULTURE PNL UR: YES
UA DIPSTICK W REFLEX MICRO PNL UR: YES
URN SPEC COLLECT METH UR: ABNORMAL
UROBILINOGEN UR STRIP-ACNC: 1 E.U./DL
WBC # BLD AUTO: 9.4 K/UL (ref 4–11)
WBC #/AREA URNS AUTO: 1790 /HPF (ref 0–5)

## 2023-06-16 PROCEDURE — 81001 URINALYSIS AUTO W/SCOPE: CPT

## 2023-06-16 PROCEDURE — 2580000003 HC RX 258: Performed by: STUDENT IN AN ORGANIZED HEALTH CARE EDUCATION/TRAINING PROGRAM

## 2023-06-16 PROCEDURE — 1200000000 HC SEMI PRIVATE

## 2023-06-16 PROCEDURE — 83735 ASSAY OF MAGNESIUM: CPT

## 2023-06-16 PROCEDURE — 84484 ASSAY OF TROPONIN QUANT: CPT

## 2023-06-16 PROCEDURE — 87186 SC STD MICRODIL/AGAR DIL: CPT

## 2023-06-16 PROCEDURE — 97129 THER IVNTJ 1ST 15 MIN: CPT

## 2023-06-16 PROCEDURE — 92526 ORAL FUNCTION THERAPY: CPT

## 2023-06-16 PROCEDURE — 6360000002 HC RX W HCPCS: Performed by: NURSE PRACTITIONER

## 2023-06-16 PROCEDURE — 85025 COMPLETE CBC W/AUTO DIFF WBC: CPT

## 2023-06-16 PROCEDURE — 97535 SELF CARE MNGMENT TRAINING: CPT

## 2023-06-16 PROCEDURE — 6370000000 HC RX 637 (ALT 250 FOR IP): Performed by: STUDENT IN AN ORGANIZED HEALTH CARE EDUCATION/TRAINING PROGRAM

## 2023-06-16 PROCEDURE — 2580000003 HC RX 258: Performed by: INTERNAL MEDICINE

## 2023-06-16 PROCEDURE — 93005 ELECTROCARDIOGRAM TRACING: CPT | Performed by: HOSPITALIST

## 2023-06-16 PROCEDURE — 80069 RENAL FUNCTION PANEL: CPT

## 2023-06-16 PROCEDURE — 93010 ELECTROCARDIOGRAM REPORT: CPT | Performed by: INTERNAL MEDICINE

## 2023-06-16 PROCEDURE — 83605 ASSAY OF LACTIC ACID: CPT

## 2023-06-16 PROCEDURE — 6360000002 HC RX W HCPCS: Performed by: INTERNAL MEDICINE

## 2023-06-16 PROCEDURE — 97530 THERAPEUTIC ACTIVITIES: CPT

## 2023-06-16 PROCEDURE — 87077 CULTURE AEROBIC IDENTIFY: CPT

## 2023-06-16 PROCEDURE — 87040 BLOOD CULTURE FOR BACTERIA: CPT

## 2023-06-16 PROCEDURE — 87086 URINE CULTURE/COLONY COUNT: CPT

## 2023-06-16 PROCEDURE — 6360000002 HC RX W HCPCS: Performed by: STUDENT IN AN ORGANIZED HEALTH CARE EDUCATION/TRAINING PROGRAM

## 2023-06-16 PROCEDURE — 36415 COLL VENOUS BLD VENIPUNCTURE: CPT

## 2023-06-16 PROCEDURE — 6370000000 HC RX 637 (ALT 250 FOR IP): Performed by: INTERNAL MEDICINE

## 2023-06-16 PROCEDURE — C9113 INJ PANTOPRAZOLE SODIUM, VIA: HCPCS | Performed by: INTERNAL MEDICINE

## 2023-06-16 RX ORDER — METOPROLOL SUCCINATE 25 MG/1
12.5 TABLET, EXTENDED RELEASE ORAL EVERY MORNING
Status: DISCONTINUED | OUTPATIENT
Start: 2023-06-16 | End: 2023-06-19 | Stop reason: HOSPADM

## 2023-06-16 RX ORDER — DEXTROSE AND SODIUM CHLORIDE 5; .9 G/100ML; G/100ML
INJECTION, SOLUTION INTRAVENOUS CONTINUOUS
Status: DISCONTINUED | OUTPATIENT
Start: 2023-06-16 | End: 2023-06-18

## 2023-06-16 RX ADMIN — CEFTRIAXONE SODIUM 1000 MG: 1 INJECTION, POWDER, FOR SOLUTION INTRAMUSCULAR; INTRAVENOUS at 16:27

## 2023-06-16 RX ADMIN — PANTOPRAZOLE SODIUM 40 MG: 40 INJECTION, POWDER, FOR SOLUTION INTRAVENOUS at 08:13

## 2023-06-16 RX ADMIN — Medication 10 ML: at 08:14

## 2023-06-16 RX ADMIN — METOPROLOL SUCCINATE 12.5 MG: 25 TABLET, FILM COATED, EXTENDED RELEASE ORAL at 12:27

## 2023-06-16 RX ADMIN — SODIUM ZIRCONIUM CYCLOSILICATE 10 G: 10 POWDER, FOR SUSPENSION ORAL at 08:13

## 2023-06-16 RX ADMIN — DEXTROSE AND SODIUM CHLORIDE: 5; 900 INJECTION, SOLUTION INTRAVENOUS at 10:40

## 2023-06-16 RX ADMIN — Medication 15 G: at 08:13

## 2023-06-16 RX ADMIN — Medication 10 ML: at 20:28

## 2023-06-16 RX ADMIN — ZIPRASIDONE MESYLATE 20 MG: 20 INJECTION, POWDER, LYOPHILIZED, FOR SOLUTION INTRAMUSCULAR at 00:27

## 2023-06-16 RX ADMIN — DEXTROSE AND SODIUM CHLORIDE: 5; 900 INJECTION, SOLUTION INTRAVENOUS at 22:41

## 2023-06-16 RX ADMIN — ATORVASTATIN CALCIUM 10 MG: 10 TABLET, FILM COATED ORAL at 18:08

## 2023-06-16 ASSESSMENT — PAIN SCALES - WONG BAKER
WONGBAKER_NUMERICALRESPONSE: 2;0

## 2023-06-17 LAB
ALBUMIN SERPL-MCNC: 2.4 G/DL (ref 3.4–5)
ANION GAP SERPL CALCULATED.3IONS-SCNC: 9 MMOL/L (ref 3–16)
BUN SERPL-MCNC: 54 MG/DL (ref 7–20)
CALCIUM SERPL-MCNC: 8.4 MG/DL (ref 8.3–10.6)
CHLORIDE SERPL-SCNC: 107 MMOL/L (ref 99–110)
CO2 SERPL-SCNC: 19 MMOL/L (ref 21–32)
CREAT SERPL-MCNC: 1.7 MG/DL (ref 0.8–1.3)
GFR SERPLBLD CREATININE-BSD FMLA CKD-EPI: 38 ML/MIN/{1.73_M2}
GLUCOSE BLD-MCNC: 120 MG/DL (ref 70–99)
GLUCOSE BLD-MCNC: 139 MG/DL (ref 70–99)
GLUCOSE BLD-MCNC: 154 MG/DL (ref 70–99)
GLUCOSE BLD-MCNC: 81 MG/DL (ref 70–99)
GLUCOSE SERPL-MCNC: 131 MG/DL (ref 70–99)
PERFORMED ON: ABNORMAL
PERFORMED ON: NORMAL
PHOSPHATE SERPL-MCNC: 3.4 MG/DL (ref 2.5–4.9)
POTASSIUM SERPL-SCNC: 5 MMOL/L (ref 3.5–5.1)
SODIUM SERPL-SCNC: 135 MMOL/L (ref 136–145)

## 2023-06-17 PROCEDURE — 2580000003 HC RX 258: Performed by: STUDENT IN AN ORGANIZED HEALTH CARE EDUCATION/TRAINING PROGRAM

## 2023-06-17 PROCEDURE — 6370000000 HC RX 637 (ALT 250 FOR IP): Performed by: STUDENT IN AN ORGANIZED HEALTH CARE EDUCATION/TRAINING PROGRAM

## 2023-06-17 PROCEDURE — 80069 RENAL FUNCTION PANEL: CPT

## 2023-06-17 PROCEDURE — 36415 COLL VENOUS BLD VENIPUNCTURE: CPT

## 2023-06-17 PROCEDURE — 6360000002 HC RX W HCPCS: Performed by: STUDENT IN AN ORGANIZED HEALTH CARE EDUCATION/TRAINING PROGRAM

## 2023-06-17 PROCEDURE — 2580000003 HC RX 258: Performed by: INTERNAL MEDICINE

## 2023-06-17 PROCEDURE — 6370000000 HC RX 637 (ALT 250 FOR IP): Performed by: INTERNAL MEDICINE

## 2023-06-17 PROCEDURE — 1200000000 HC SEMI PRIVATE

## 2023-06-17 RX ADMIN — Medication 10 ML: at 20:50

## 2023-06-17 RX ADMIN — Medication 10 ML: at 19:41

## 2023-06-17 RX ADMIN — DEXTROSE AND SODIUM CHLORIDE: 5; 900 INJECTION, SOLUTION INTRAVENOUS at 20:28

## 2023-06-17 RX ADMIN — CEFTRIAXONE SODIUM 1000 MG: 1 INJECTION, POWDER, FOR SOLUTION INTRAMUSCULAR; INTRAVENOUS at 19:05

## 2023-06-17 RX ADMIN — LORAZEPAM 1 MG: 1 TABLET ORAL at 21:16

## 2023-06-17 RX ADMIN — ATORVASTATIN CALCIUM 10 MG: 10 TABLET, FILM COATED ORAL at 18:54

## 2023-06-18 LAB
ALBUMIN SERPL-MCNC: 2.4 G/DL (ref 3.4–5)
ANION GAP SERPL CALCULATED.3IONS-SCNC: 6 MMOL/L (ref 3–16)
BUN SERPL-MCNC: 37 MG/DL (ref 7–20)
CALCIUM SERPL-MCNC: 8.4 MG/DL (ref 8.3–10.6)
CHLORIDE SERPL-SCNC: 108 MMOL/L (ref 99–110)
CO2 SERPL-SCNC: 23 MMOL/L (ref 21–32)
CREAT SERPL-MCNC: 1.4 MG/DL (ref 0.8–1.3)
GFR SERPLBLD CREATININE-BSD FMLA CKD-EPI: 48 ML/MIN/{1.73_M2}
GLUCOSE BLD-MCNC: 112 MG/DL (ref 70–99)
GLUCOSE BLD-MCNC: 151 MG/DL (ref 70–99)
GLUCOSE BLD-MCNC: 183 MG/DL (ref 70–99)
GLUCOSE BLD-MCNC: 95 MG/DL (ref 70–99)
GLUCOSE SERPL-MCNC: 94 MG/DL (ref 70–99)
PERFORMED ON: ABNORMAL
PERFORMED ON: NORMAL
PHOSPHATE SERPL-MCNC: 3 MG/DL (ref 2.5–4.9)
POTASSIUM SERPL-SCNC: 5 MMOL/L (ref 3.5–5.1)
SODIUM SERPL-SCNC: 137 MMOL/L (ref 136–145)

## 2023-06-18 PROCEDURE — 6370000000 HC RX 637 (ALT 250 FOR IP): Performed by: INTERNAL MEDICINE

## 2023-06-18 PROCEDURE — 36415 COLL VENOUS BLD VENIPUNCTURE: CPT

## 2023-06-18 PROCEDURE — 80069 RENAL FUNCTION PANEL: CPT

## 2023-06-18 PROCEDURE — 6370000000 HC RX 637 (ALT 250 FOR IP): Performed by: STUDENT IN AN ORGANIZED HEALTH CARE EDUCATION/TRAINING PROGRAM

## 2023-06-18 PROCEDURE — 1200000000 HC SEMI PRIVATE

## 2023-06-18 RX ORDER — LINEZOLID 600 MG/1
600 TABLET, FILM COATED ORAL EVERY 12 HOURS SCHEDULED
Status: DISCONTINUED | OUTPATIENT
Start: 2023-06-18 | End: 2023-06-19 | Stop reason: HOSPADM

## 2023-06-18 RX ADMIN — LORAZEPAM 1 MG: 1 TABLET ORAL at 21:02

## 2023-06-18 RX ADMIN — LINEZOLID 600 MG: 600 TABLET, FILM COATED ORAL at 21:02

## 2023-06-18 RX ADMIN — ATORVASTATIN CALCIUM 10 MG: 10 TABLET, FILM COATED ORAL at 17:56

## 2023-06-18 ASSESSMENT — PAIN SCALES - WONG BAKER
WONGBAKER_NUMERICALRESPONSE: 0

## 2023-06-18 ASSESSMENT — PAIN SCALES - GENERAL: PAINLEVEL_OUTOF10: 0

## 2023-06-19 VITALS
OXYGEN SATURATION: 96 % | RESPIRATION RATE: 18 BRPM | SYSTOLIC BLOOD PRESSURE: 127 MMHG | HEIGHT: 74 IN | HEART RATE: 84 BPM | DIASTOLIC BLOOD PRESSURE: 66 MMHG | WEIGHT: 200 LBS | TEMPERATURE: 98.5 F | BODY MASS INDEX: 25.67 KG/M2

## 2023-06-19 LAB
ALBUMIN SERPL-MCNC: 2.5 G/DL (ref 3.4–5)
ANION GAP SERPL CALCULATED.3IONS-SCNC: 5 MMOL/L (ref 3–16)
BACTERIA UR CULT: ABNORMAL
BASOPHILS # BLD: 0.1 K/UL (ref 0–0.2)
BASOPHILS NFR BLD: 0.8 %
BUN SERPL-MCNC: 27 MG/DL (ref 7–20)
CALCIUM SERPL-MCNC: 8.8 MG/DL (ref 8.3–10.6)
CHLORIDE SERPL-SCNC: 108 MMOL/L (ref 99–110)
CO2 SERPL-SCNC: 25 MMOL/L (ref 21–32)
CREAT SERPL-MCNC: 1.3 MG/DL (ref 0.8–1.3)
DEPRECATED RDW RBC AUTO: 17.7 % (ref 12.4–15.4)
EOSINOPHIL # BLD: 0.2 K/UL (ref 0–0.6)
EOSINOPHIL NFR BLD: 2.4 %
GFR SERPLBLD CREATININE-BSD FMLA CKD-EPI: 52 ML/MIN/{1.73_M2}
GLUCOSE BLD-MCNC: 116 MG/DL (ref 70–99)
GLUCOSE BLD-MCNC: 88 MG/DL (ref 70–99)
GLUCOSE SERPL-MCNC: 98 MG/DL (ref 70–99)
HCT VFR BLD AUTO: 27.5 % (ref 40.5–52.5)
HGB BLD-MCNC: 8.8 G/DL (ref 13.5–17.5)
LYMPHOCYTES # BLD: 1.2 K/UL (ref 1–5.1)
LYMPHOCYTES NFR BLD: 15.2 %
MCH RBC QN AUTO: 27.5 PG (ref 26–34)
MCHC RBC AUTO-ENTMCNC: 32 G/DL (ref 31–36)
MCV RBC AUTO: 85.9 FL (ref 80–100)
MONOCYTES # BLD: 0.9 K/UL (ref 0–1.3)
MONOCYTES NFR BLD: 11.2 %
NEUTROPHILS # BLD: 5.4 K/UL (ref 1.7–7.7)
NEUTROPHILS NFR BLD: 70.4 %
ORGANISM: ABNORMAL
PERFORMED ON: ABNORMAL
PERFORMED ON: NORMAL
PHOSPHATE SERPL-MCNC: 2.9 MG/DL (ref 2.5–4.9)
PLATELET # BLD AUTO: 139 K/UL (ref 135–450)
PMV BLD AUTO: 8.7 FL (ref 5–10.5)
POTASSIUM SERPL-SCNC: 5 MMOL/L (ref 3.5–5.1)
RBC # BLD AUTO: 3.2 M/UL (ref 4.2–5.9)
SODIUM SERPL-SCNC: 138 MMOL/L (ref 136–145)
WBC # BLD AUTO: 7.6 K/UL (ref 4–11)

## 2023-06-19 PROCEDURE — 2580000003 HC RX 258: Performed by: STUDENT IN AN ORGANIZED HEALTH CARE EDUCATION/TRAINING PROGRAM

## 2023-06-19 PROCEDURE — 36415 COLL VENOUS BLD VENIPUNCTURE: CPT

## 2023-06-19 PROCEDURE — 85025 COMPLETE CBC W/AUTO DIFF WBC: CPT

## 2023-06-19 PROCEDURE — 80069 RENAL FUNCTION PANEL: CPT

## 2023-06-19 PROCEDURE — 6360000002 HC RX W HCPCS: Performed by: INTERNAL MEDICINE

## 2023-06-19 PROCEDURE — 92526 ORAL FUNCTION THERAPY: CPT

## 2023-06-19 PROCEDURE — C9113 INJ PANTOPRAZOLE SODIUM, VIA: HCPCS | Performed by: INTERNAL MEDICINE

## 2023-06-19 PROCEDURE — 6370000000 HC RX 637 (ALT 250 FOR IP): Performed by: INTERNAL MEDICINE

## 2023-06-19 PROCEDURE — 6370000000 HC RX 637 (ALT 250 FOR IP): Performed by: STUDENT IN AN ORGANIZED HEALTH CARE EDUCATION/TRAINING PROGRAM

## 2023-06-19 PROCEDURE — 97129 THER IVNTJ 1ST 15 MIN: CPT

## 2023-06-19 PROCEDURE — 97535 SELF CARE MNGMENT TRAINING: CPT

## 2023-06-19 RX ORDER — CEPHALEXIN 500 MG/1
500 CAPSULE ORAL 2 TIMES DAILY
Qty: 8 CAPSULE | Refills: 0 | Status: SHIPPED | OUTPATIENT
Start: 2023-06-19

## 2023-06-19 RX ORDER — METOPROLOL SUCCINATE 25 MG/1
12.5 TABLET, EXTENDED RELEASE ORAL EVERY MORNING
Qty: 30 TABLET | Refills: 3 | Status: SHIPPED | OUTPATIENT
Start: 2023-06-20

## 2023-06-19 RX ADMIN — Medication 15 G: at 11:02

## 2023-06-19 RX ADMIN — LINEZOLID 600 MG: 600 TABLET, FILM COATED ORAL at 09:32

## 2023-06-19 RX ADMIN — METOPROLOL SUCCINATE 12.5 MG: 25 TABLET, FILM COATED, EXTENDED RELEASE ORAL at 09:32

## 2023-06-19 NOTE — PROGRESS NOTES
Facility/Department: [address] ONCOLOGY  Speech Language Pathology   Dysphagia and Speech Language/Cognitive Treatment Note    Patient: Corey Vergara Sr.   : 1933   MRN: [id number]      Evaluation Date: 2023      Admitting Dx: Hyponatremia [E87.1]  Dyspnea and respiratory abnormalities [R06.00, R06.89]  ROSALIA (acute kidney injury) ([facility] [N17.9]  Treatment Diagnosis: Cognitive-Linguistic Deficits , Oropharyngeal Dysphagia   Pain: Denies                                                Subjective:  Pt finishing up with OT upon SLP entry. Sitter present at bedside as well. Sitter left during SLP session but returned upon finishing. Pt remained agitated throughout session but was easily redirected. Dysphagia Treatment:   Diet and Treatment Recommendations 2023:  Diet Solids Recommendation:  Dysphagia III Soft and bite sized  Liquid Consistency Recommendation: Thin liquids  Recommended form of Meds: Meds whole with water or Meds in puree    If patient demonstrates increase in overt s/s aspiration/penetration or change in respiratory status recommend downgrade to NPO with ongoing assessment by SLP. Compensatory strategies: Alternate solids/liquids , Upright as possible with all PO intake , Small bites/sips , Eat/feed slowly, Remain upright 30-45 min     Assessment of Texture Tolerance:  Diet level prior to treatment: Dysphagia II Minced and Moist , Thin liquids   Tolerance of Current Diet Level:RN reported pt appears to be tolerating current diet level      -Impressions: Pt was positioned Upright in bed , awake and alert. Currently on room air. Trials of thin liquids and soft solids were provided to assess swallow function. Pt able to self feed with SLP set up assistance. Pt presenting with no anterior loss, decreased oral prep and AP transit, decreased but appears functional mastication, good oral clearance, and delayed swallow initiation.  x2 coughs post swallow with trials of ice cream. Pt

## 2023-06-19 NOTE — CARE COORDINATION
Case Management -  Discharge Note      Patient Name: Dawson Allison. YOB: 1933            Readmission Risk (Low < 19, Mod (19-27), High > 27): Readmission Risk Score: 16.7    Current PCP: Swathi Ireland MD    ([facility]) Important Message from Medicare:    Date: 6/19/2023    PT AM-PAC: 8 /24  OT AM-PAC: 8 /24    Patient/patient representative has acknowledged the information provided and decided on the following discharge plan. Patient/ patient representative has been provided freedom of choice regarding service provider, supported by basic dialogue that supports the patient's individualized plan of care/goals. Patient noted to have a discharge order.   Pt has been medically cleared for transition to [address] [place] [address]    Patient discharged to   [address] [place] [address]  Report: [phone]  Fax: [phone]        HENS Completed:  Yes  Pre-cert required/obtained:  Yes    Transportation scheduled for 5:00  Transportation provided by [employer]  ([phone])   AVS faxed and agency notified:  Yes  Family Notified:  Yes    Nurse to call report to facility         Financial    Payor: [address] / Plan: [place] / Product Type: *No Product type* /

## 2023-06-19 NOTE — PROGRESS NOTES
Pt a&ox2 to self and place. RA, unlabored breathing. VS stable. Pt denies any pain at this time. Call light within reach.

## 2023-06-19 NOTE — DISCHARGE SUMMARY
Patient: Orin Gaming Sr.     Gender: male  : 1933   Age: 80 y.o. MRN: [id number]    Admitting Physician: Maria De Jesus Diallo DO  Discharge Physician: Francesca Almodovar MD     Code Status: DNR-CCA     Admit Date: 2023   Discharge Date: 2023     Disposition:   To a non-[facility] facility    Discharge Diagnoses:  Acute encephalopathy suspected milligram background of some dementia with ongoing delirium resolved  Severe anemia secondary to ascites  Staph aureus UTI  Hyperkalemia resolved  Acute kidney injury on baseline CKD stage III      Active Hospital Problems    Diagnosis Date Noted    Type 2 diabetes mellitus with hyperglycemia, without long-term current use of insulin (HCC) [E11.65]      Priority: Medium    Hypertension associated with diabetes ([facility] [E11.59, I15.2]      Priority: Medium    Coronary artery disease involving native coronary artery of native heart without angina pectoris [I25.10]      Priority: Medium    AAA (abdominal aortic aneurysm) without rupture ([facility] [I71.40]      Priority: Medium    Hyponatremia [E87.1] 2023    HFrEF (heart failure with reduced ejection fraction) ([facility] [I50.20] 2023    Chronic diastolic heart failure ([facility] [I50.32] 2023    Hyperlipidemia associated with type 2 diabetes mellitus ([facility] [E11.69, E78.5] 2023    ROSALIA (acute kidney injury) ([facility] [facility] [N17.9] 2023    Acute anemia [D64.9] 2023       Follow-up appointments:  one week    Outpatient to do list: none     Condition at Discharge:  Stable    Hospital Course:   40-year-old admitted to the hospital with altered mental status     Acute encephalopathy suspect metabolic on background of some dementia with ongoing delirium  He has a UTI hyponatremia ROSALIA  All of these are already corrected treated he is remained confused oriented to self  Speaking with his daughters he has memory issues and I suspect he has some underlying dementia  CT of the head WAS NEGATIVE  HE IS ORIENTED TO SELF AND

## 2023-06-19 NOTE — PROGRESS NOTES
Patient is oriented to self only. VSS. Denies pain. Curry in place for retention with appropriate output. Encouraged fluids. Fall precautions in place.

## 2023-06-19 NOTE — PROGRESS NOTES
[facility]   Nephrology progress Note  [phone]   [facility]. [employer]           SUMMARY :  We are following this patient for Hyponatremia, CKD stage 1 29-year-old male with past medical history of CKD stage 3b, hypertension, T2DM, coronary artery disease s/p CABG, CHF, hyperlipidemia, presented to the hospital with progressive dyspnea and black tarry stools. Chest x-ray revealed left basilar atelectasis. CT chest abdomen pelvis showed no acute abnormalities. He did have his chronic abdominal aortic aneurysm as well as mild bilateral hydronephrosis. On admission he had a serum sodium of 103 with a creatinine of 1.4 and at the time of consult sodium improved to 107. At home he was on diuretics furosemide and spironolactone along with ACE inhibitor lisinopril. SUBJECTIVE:   Patient progress reviewed.    6/12/23: sitting up in bed, eating , no complaints   6/13/23: restless, did not sleep   6/14/23: came back to see the patient , Worsening creatinine and Increase in K, No urine output has been recorded   Bladder scan ordered showed > 1000 ml, alcaraz placed  6/15/23: confused, no po intake, no IV fluids  6/16/23: Remains confused, has poor p.o. intake, for some reason IV fluids are not running  6/17/23: awake , IV fluid a temporary stop because he ran out of IV access  6/18/23: Drowsy, apparently was agitated overnight  6/19/23: awake , oriented, wants me to stay for \" company \"      Physical Exam:    VITALS:  /66   Pulse 74   Temp 97.7 °F (36.5 °C) (Oral)   Resp 16   Ht 6' 2\" (1.88 m)   Wt 200 lb (90.7 kg)   SpO2 96%   BMI 25.68 kg/m²   BLOOD PRESSURE RANGE:  Systolic (71DRW), VANESSA:779 , Min:111 , XWC:213   ; Diastolic (13EKN), WRJ:09, Min:51, Max:85    24HR INTAKE/OUTPUT:    Intake/Output Summary (Last 24 hours) at 6/19/2023 1015  Last data filed at 6/19/2023 0622  Gross per 24 hour   Intake 480 ml   Output 1075 ml   Net -595 ml         Gen:   Alert, oriented x

## 2023-06-19 NOTE — PROGRESS NOTES
[facility] Department   Phone: [phone]    Occupational Therapy    [] Initial Evaluation            [x] Daily Treatment Note         [] Discharge Summary      Patient: Luis Manuel Keys Sr.   : 1933   MRN: [id number]   Date of Service:  2023    Admitting Diagnosis:  Hyponatremia  Current Admission Summary: Patient is a 80 y.o. male with past medical history of CKD, CHF, DM, HTN, HLD and AAA who presented to the ED with complaints of SOB. Patient reports he lives independently. On admission he was noted to have a sodium of 104 with ROSALIA. He has a known AAA and was seen by vascular surgery at [facility] in 2017 and was planning to fix endovascular repair of AAA but patient had cancelled surgery and did not reschedule and was hesitant to fix at that time. Patient is drowsy but denies any chest, abdomen or back pain. He reports he ambulates without issues. On admission CT showed AAA measuring 8.1 cm; therefore, we have been consulted for further evaluation and recommendations. Per ED note, \"Home hospice visited the patient 23, pt chose to remain full code at that time\"  Past Medical History:  has a past medical history of Chronic systolic (congestive) heart failure ([address] CKD (chronic kidney disease) stage 4, GFR 15-29 ml/min ([address] Diabetes mellitus ([address] Epistaxis, recurrent, H/O Clostridium difficile infection, Hyperlipidemia, Hypertension, MI (myocardial infarction) ([address] Normocytic anemia, and Thrombocytopenia, acquired ([address] Past Surgical History:  has a past surgical history that includes Cardiac surgery and hernia repair. Discharge Recommendations: Luis Manuel Keys Sr. scored a  on the AM-PAC ADL Inpatient form. Current research shows that an AM-PAC score of 17 or less is typically not associated with a discharge to the patient's home setting.  Based on the patient's AM-PAC score and their current ADL deficits, it is recommended that

## 2023-06-19 NOTE — DISCHARGE INSTR - COC
Continuity of Care Form    Patient Name: Jennifer Turner   :  1933  MRN:  [id number]    Admit date:  2023  Discharge date:  2023    Code Status Order: DNR-CCA   Advance Directives:     Admitting Physician:  Mary Grace Mendez DO  PCP: Brad Billingsley MD    Discharging Nurse: Mayra Cruz Unit/Room#: 5TR-8234/0686-98  Discharging Unit Phone Number: [phone]    Emergency Contact:   Extended Emergency Contact Information  Primary Emergency Contact: [address] Phone: [phone]  Mobile Phone: [phone]  Relation: Child  Secondary Emergency Contact: [address] Phone: [phone]  Mobile Phone: [phone]  Relation: Child    Past Surgical History:  Past Surgical History:   Procedure Laterality Date    CARDIAC SURGERY      CABGx5 vessels    HERNIA REPAIR      x 3       Immunization History: There is no immunization history on file for this patient.     Active Problems:  Patient Active Problem List   Diagnosis Code    Acute anterior epistaxis R04.0    Acute on chronic systolic heart failure ([facility]) I50.23    AAA (abdominal aortic aneurysm) without rupture ([facility]) I71.40    Coronary artery disease involving native coronary artery of native heart without angina pectoris I25.10    Type 2 diabetes mellitus with hyperglycemia, without long-term current use of insulin ([facility]) E11.65    Hypertension associated with diabetes ([facility] E11.59, I15.2    Diabetes mellitus with skin ulcer ([facility]) E11.622, L98.499    Non-pressure chronic ulcer of left lower leg with fat layer exposed ([facility] L97.922    Impaired mobility Z74.09    Contusion of left knee and lower leg S80.02XA, S80.12XA    Hyponatremia E87.1    HFrEF (heart failure with reduced ejection fraction) ([facility]) I50.20    Chronic diastolic heart failure ([facility]) I50.32    Hyperlipidemia associated with type 2 diabetes mellitus ([facility]) E11.69, E78.5    ROSALIA (acute kidney injury) ([facility] N17.9    Acute anemia D64.9

## 2023-06-19 NOTE — PLAN OF CARE
Problem: Skin/Tissue Integrity  Goal: Absence of new skin breakdown  Description: 1. Monitor for areas of redness and/or skin breakdown  2. Assess vascular access sites hourly  3. Every 4-6 hours minimum:  Change oxygen saturation probe site  4. Every 4-6 hours:  If on nasal continuous positive airway pressure, respiratory therapy assess nares and determine need for appliance change or resting period. 6/18/2023 2203 by Sravani Khan RN  Outcome: Progressing   Patient turned and repositioned every two hours. Barrier cream used on bottom. Sacral heart on buttocks. Heels elevated off of bed. Specialty mattress in use. Skin thoroughly assessed. Problem: Safety - Adult  Goal: Free from fall injury  Outcome: Progressing   Patient has remained free of falls. 2/4 bed rails up, bed locked and in lowest position, call light within reach. Patient instructed on use of call light and does not use appropriately. Bed alarm on. Non-skid footwear and fall band on.

## 2023-06-20 LAB
BACTERIA BLD CULT ORG #2: NORMAL
BACTERIA BLD CULT: NORMAL